# Patient Record
Sex: MALE | Race: WHITE | Employment: OTHER
[De-identification: names, ages, dates, MRNs, and addresses within clinical notes are randomized per-mention and may not be internally consistent; named-entity substitution may affect disease eponyms.]

---

## 2022-03-19 PROBLEM — I65.29 CAROTID STENOSIS, ASYMPTOMATIC: Status: ACTIVE | Noted: 2021-09-23

## 2022-03-19 PROBLEM — I25.5 GENERALIZED ISCHEMIC MYOCARDIAL DYSFUNCTION: Status: ACTIVE | Noted: 2017-07-20

## 2022-03-19 PROBLEM — N18.30 STAGE 3 CHRONIC KIDNEY DISEASE (HCC): Status: ACTIVE | Noted: 2019-03-12

## 2022-03-19 PROBLEM — I42.0 ISCHEMIC CONGESTIVE CARDIOMYOPATHY (HCC): Status: ACTIVE | Noted: 2022-03-02

## 2022-03-19 PROBLEM — F32.A DEPRESSIVE DISORDER: Status: ACTIVE | Noted: 2019-06-23

## 2022-03-19 PROBLEM — M48.061 FORAMINAL STENOSIS OF LUMBAR REGION: Status: ACTIVE | Noted: 2018-01-23

## 2022-03-19 PROBLEM — H54.8 LEGALLY BLIND: Status: ACTIVE | Noted: 2018-09-26

## 2022-03-19 PROBLEM — I25.10 CORONARY ARTERY DISEASE INVOLVING NATIVE CORONARY ARTERY OF NATIVE HEART WITHOUT ANGINA PECTORIS: Status: ACTIVE | Noted: 2021-09-23

## 2022-03-19 PROBLEM — E11.9 DIABETES MELLITUS (HCC): Status: ACTIVE | Noted: 2018-09-26

## 2022-03-19 PROBLEM — Z95.810 AICD (AUTOMATIC CARDIOVERTER/DEFIBRILLATOR) PRESENT: Status: ACTIVE | Noted: 2021-09-14

## 2022-03-19 PROBLEM — G89.29 CHRONIC BILATERAL LOW BACK PAIN WITH RIGHT-SIDED SCIATICA: Status: ACTIVE | Noted: 2018-01-23

## 2022-03-19 PROBLEM — E53.8 COBALAMIN DEFICIENCY: Status: ACTIVE | Noted: 2022-03-02

## 2022-03-19 PROBLEM — M54.41 CHRONIC BILATERAL LOW BACK PAIN WITH RIGHT-SIDED SCIATICA: Status: ACTIVE | Noted: 2018-01-23

## 2022-03-19 PROBLEM — Z95.5 HISTORY OF CORONARY ARTERY STENT PLACEMENT: Status: ACTIVE | Noted: 2017-06-02

## 2022-03-19 PROBLEM — I25.5 ISCHEMIC CONGESTIVE CARDIOMYOPATHY (HCC): Status: ACTIVE | Noted: 2022-03-02

## 2022-03-20 PROBLEM — I65.23 BILATERAL CAROTID ARTERY STENOSIS: Status: ACTIVE | Noted: 2021-09-14

## 2022-03-20 PROBLEM — E78.5 HYPERLIPIDEMIA: Status: ACTIVE | Noted: 2018-09-26

## 2022-03-20 PROBLEM — N40.0 BENIGN PROSTATIC HYPERPLASIA WITHOUT URINARY OBSTRUCTION: Status: ACTIVE | Noted: 2018-09-26

## 2022-06-09 ENCOUNTER — OFFICE VISIT (OUTPATIENT)
Dept: FAMILY MEDICINE CLINIC | Facility: CLINIC | Age: 82
End: 2022-06-09
Payer: MEDICARE

## 2022-06-09 VITALS
HEIGHT: 67 IN | WEIGHT: 168 LBS | HEART RATE: 63 BPM | SYSTOLIC BLOOD PRESSURE: 104 MMHG | TEMPERATURE: 98.3 F | DIASTOLIC BLOOD PRESSURE: 62 MMHG | BODY MASS INDEX: 26.37 KG/M2

## 2022-06-09 DIAGNOSIS — E11.9 TYPE 2 DIABETES MELLITUS WITHOUT COMPLICATION, WITH LONG-TERM CURRENT USE OF INSULIN (HCC): ICD-10-CM

## 2022-06-09 DIAGNOSIS — E11.69 HYPERLIPIDEMIA ASSOCIATED WITH TYPE 2 DIABETES MELLITUS (HCC): ICD-10-CM

## 2022-06-09 DIAGNOSIS — Z79.4 TYPE 2 DIABETES MELLITUS WITHOUT COMPLICATION, WITH LONG-TERM CURRENT USE OF INSULIN (HCC): ICD-10-CM

## 2022-06-09 DIAGNOSIS — N18.31 STAGE 3A CHRONIC KIDNEY DISEASE (HCC): ICD-10-CM

## 2022-06-09 DIAGNOSIS — E78.5 HYPERLIPIDEMIA ASSOCIATED WITH TYPE 2 DIABETES MELLITUS (HCC): ICD-10-CM

## 2022-06-09 DIAGNOSIS — I25.10 CORONARY ARTERY DISEASE INVOLVING NATIVE CORONARY ARTERY OF NATIVE HEART WITHOUT ANGINA PECTORIS: Primary | ICD-10-CM

## 2022-06-09 PROBLEM — N18.30 CHRONIC RENAL DISEASE, STAGE III (HCC): Status: ACTIVE | Noted: 2022-06-09

## 2022-06-09 PROCEDURE — 99214 OFFICE O/P EST MOD 30 MIN: CPT | Performed by: NURSE PRACTITIONER

## 2022-06-09 PROCEDURE — 1123F ACP DISCUSS/DSCN MKR DOCD: CPT | Performed by: NURSE PRACTITIONER

## 2022-06-09 PROCEDURE — 3046F HEMOGLOBIN A1C LEVEL >9.0%: CPT | Performed by: NURSE PRACTITIONER

## 2022-06-09 RX ORDER — AMOXICILLIN 500 MG/1
CAPSULE ORAL
COMMUNITY
Start: 2022-06-06 | End: 2022-07-20 | Stop reason: CLARIF

## 2022-06-09 RX ORDER — BLOOD SUGAR DIAGNOSTIC
1 STRIP MISCELLANEOUS DAILY
COMMUNITY
Start: 2022-05-29

## 2022-06-09 RX ORDER — ROSUVASTATIN CALCIUM 20 MG/1
20 TABLET, COATED ORAL DAILY
Qty: 90 TABLET | Refills: 1 | Status: SHIPPED | OUTPATIENT
Start: 2022-06-09

## 2022-06-09 RX ORDER — EZETIMIBE 10 MG/1
10 TABLET ORAL DAILY
Qty: 90 TABLET | Refills: 1 | Status: SHIPPED | OUTPATIENT
Start: 2022-06-09

## 2022-06-09 RX ORDER — METOPROLOL SUCCINATE 25 MG/1
25 TABLET, EXTENDED RELEASE ORAL DAILY
Qty: 90 TABLET | Refills: 1 | Status: SHIPPED | OUTPATIENT
Start: 2022-06-09 | End: 2022-08-01 | Stop reason: SDUPTHER

## 2022-06-09 RX ORDER — GLIMEPIRIDE 2 MG/1
2 TABLET ORAL DAILY
Qty: 90 TABLET | Refills: 1 | Status: SHIPPED | OUTPATIENT
Start: 2022-06-09

## 2022-06-09 ASSESSMENT — PATIENT HEALTH QUESTIONNAIRE - PHQ9
2. FEELING DOWN, DEPRESSED OR HOPELESS: 0
6. FEELING BAD ABOUT YOURSELF - OR THAT YOU ARE A FAILURE OR HAVE LET YOURSELF OR YOUR FAMILY DOWN: 0
9. THOUGHTS THAT YOU WOULD BE BETTER OFF DEAD, OR OF HURTING YOURSELF: 0
SUM OF ALL RESPONSES TO PHQ QUESTIONS 1-9: 1
7. TROUBLE CONCENTRATING ON THINGS, SUCH AS READING THE NEWSPAPER OR WATCHING TELEVISION: 0
SUM OF ALL RESPONSES TO PHQ9 QUESTIONS 1 & 2: 0
4. FEELING TIRED OR HAVING LITTLE ENERGY: 1
SUM OF ALL RESPONSES TO PHQ QUESTIONS 1-9: 1
5. POOR APPETITE OR OVEREATING: 0
1. LITTLE INTEREST OR PLEASURE IN DOING THINGS: 0
10. IF YOU CHECKED OFF ANY PROBLEMS, HOW DIFFICULT HAVE THESE PROBLEMS MADE IT FOR YOU TO DO YOUR WORK, TAKE CARE OF THINGS AT HOME, OR GET ALONG WITH OTHER PEOPLE: 0
SUM OF ALL RESPONSES TO PHQ QUESTIONS 1-9: 1
SUM OF ALL RESPONSES TO PHQ QUESTIONS 1-9: 1
8. MOVING OR SPEAKING SO SLOWLY THAT OTHER PEOPLE COULD HAVE NOTICED. OR THE OPPOSITE, BEING SO FIGETY OR RESTLESS THAT YOU HAVE BEEN MOVING AROUND A LOT MORE THAN USUAL: 0
3. TROUBLE FALLING OR STAYING ASLEEP: 0

## 2022-06-09 ASSESSMENT — ENCOUNTER SYMPTOMS: SHORTNESS OF BREATH: 0

## 2022-06-09 NOTE — PROGRESS NOTES
Subjective:      Patient ID: Darshan Francois is a 78 y.o. male. He is here for recheck of DM consistent bs of 100-150 mostly in 120 range. Feels well  Some fatigue walking daily and going to the gym for less than an hour has no stamina. Saw a chiropractor for his right hip pain and is doing better. The only issue is getting tired. bp has gotten pretty low. He is getting  next month. Has moved out of assistive living and is walking daily with his fiancce    HPI    Review of Systems   Constitutional: Positive for fatigue. Respiratory: Negative for shortness of breath. Cardiovascular: Negative for chest pain and leg swelling. Endocrine: Negative for polydipsia and polyuria. Objective:   Physical Exam  Vitals and nursing note reviewed. Constitutional:       Appearance: Normal appearance. HENT:      Head: Normocephalic. Cardiovascular:      Rate and Rhythm: Normal rate and regular rhythm. Pulses: Normal pulses. Heart sounds: Normal heart sounds. Pulmonary:      Effort: Pulmonary effort is normal.      Breath sounds: Normal breath sounds. Musculoskeletal:         General: Normal range of motion. Skin:     General: Skin is warm and dry. Capillary Refill: Capillary refill takes less than 2 seconds. Neurological:      General: No focal deficit present. Mental Status: He is alert and oriented to person, place, and time. Psychiatric:         Mood and Affect: Mood normal.         Behavior: Behavior normal.         Thought Content: Thought content normal.         Judgment: Judgment normal.         Assessment:     /62 (Site: Left Upper Arm, Position: Sitting, Cuff Size: Medium Adult)   Pulse 63   Temp 98.3 °F (36.8 °C) (Temporal)   Ht 5' 6.54\" (1.69 m)   Wt 168 lb (76.2 kg)   BMI 26.68 kg/m²       Plan:      1.  Coronary artery disease involving native coronary artery of native heart without angina pectoris  -     metoprolol succinate (TOPROL XL) 25 MG extended release tablet; Take 1 tablet by mouth daily, Disp-90 tablet, R-1Normal  2. Type 2 diabetes mellitus without complication, with long-term current use of insulin (HCC)  -     empagliflozin (JARDIANCE) 25 MG tablet; Take 1 tablet by mouth daily, Disp-90 tablet, R-1Normal  -     glimepiride (AMARYL) 2 MG tablet; Take 1 tablet by mouth daily, Disp-90 tablet, R-1Normal  -     insulin glargine (LANTUS;BASAGLAR) 100 UNIT/ML injection pen; Inject 10 Units into the skin daily, Disp-5 pen, R-5Normal  -     Comprehensive Metabolic Panel; Future  -     Hemoglobin A1C; Future  3. Stage 3a chronic kidney disease ([facility] [address]  4. Hyperlipidemia associated with type 2 diabetes mellitus (HCC)  -     ezetimibe (ZETIA) 10 mg tablet; Take 1 tablet by mouth daily, Disp-90 tablet, R-1Normal  -     rosuvastatin (CRESTOR) 20 MG tablet; Take 1 tablet by mouth daily, Disp-90 tablet, R-1Normal  -     Lipid Panel; Future  -     Microalbumin / Creatinine Urine Ratio; Future  Due to low bp will have him hold his cozaar. Checking labs.  Is to contact us for any new or worsening issues and reminded to stay hydrated drink water        XI Ruiz NP

## 2022-06-10 ENCOUNTER — TELEPHONE (OUTPATIENT)
Dept: FAMILY MEDICINE CLINIC | Facility: CLINIC | Age: 82
End: 2022-06-10

## 2022-06-10 LAB
ALBUMIN SERPL-MCNC: 3.7 G/DL (ref 3.2–4.6)
ALBUMIN/GLOB SERPL: 1.2 {RATIO} (ref 1.2–3.5)
ALP SERPL-CCNC: 91 U/L (ref 50–136)
ALT SERPL-CCNC: 35 U/L (ref 12–65)
ANION GAP SERPL CALC-SCNC: 6 MMOL/L (ref 7–16)
AST SERPL-CCNC: 22 U/L (ref 15–37)
BILIRUB SERPL-MCNC: 0.6 MG/DL (ref 0.2–1.1)
BUN SERPL-MCNC: 21 MG/DL (ref 8–23)
CALCIUM SERPL-MCNC: 8.6 MG/DL (ref 8.3–10.4)
CHLORIDE SERPL-SCNC: 108 MMOL/L (ref 98–107)
CHOLEST SERPL-MCNC: 98 MG/DL
CO2 SERPL-SCNC: 27 MMOL/L (ref 21–32)
CREAT SERPL-MCNC: 1.5 MG/DL (ref 0.8–1.5)
CREAT UR-MCNC: 64 MG/DL
EST. AVERAGE GLUCOSE BLD GHB EST-MCNC: 166 MG/DL
GLOBULIN SER CALC-MCNC: 3.2 G/DL (ref 2.3–3.5)
GLUCOSE SERPL-MCNC: 254 MG/DL (ref 65–100)
HBA1C MFR BLD: 7.4 % (ref 4.2–6.3)
HDLC SERPL-MCNC: 42 MG/DL (ref 40–60)
HDLC SERPL: 2.3 {RATIO}
LDLC SERPL CALC-MCNC: 37.4 MG/DL
MICROALBUMIN UR-MCNC: 1.08 MG/DL
MICROALBUMIN/CREAT UR-RTO: 17 MG/G
POTASSIUM SERPL-SCNC: 4.1 MMOL/L (ref 3.5–5.1)
PROT SERPL-MCNC: 6.9 G/DL (ref 6.3–8.2)
SODIUM SERPL-SCNC: 141 MMOL/L (ref 138–145)
TRIGL SERPL-MCNC: 93 MG/DL (ref 35–150)
VLDLC SERPL CALC-MCNC: 18.6 MG/DL (ref 6–23)

## 2022-06-10 NOTE — TELEPHONE ENCOUNTER
----- Message from XI Hawkins NP sent at 6/10/2022  7:43 AM EDT -----  HgA1c is  now 7.4 which is great all other labs are stable.   Let me know how you feel off the cozaar

## 2022-07-06 ENCOUNTER — TELEPHONE (OUTPATIENT)
Dept: FAMILY MEDICINE CLINIC | Facility: CLINIC | Age: 82
End: 2022-07-06

## 2022-07-20 ENCOUNTER — OFFICE VISIT (OUTPATIENT)
Dept: FAMILY MEDICINE CLINIC | Facility: CLINIC | Age: 82
End: 2022-07-20
Payer: MEDICARE

## 2022-07-20 VITALS
WEIGHT: 162 LBS | SYSTOLIC BLOOD PRESSURE: 132 MMHG | TEMPERATURE: 98.2 F | HEART RATE: 78 BPM | DIASTOLIC BLOOD PRESSURE: 73 MMHG | BODY MASS INDEX: 25.43 KG/M2 | HEIGHT: 67 IN

## 2022-07-20 DIAGNOSIS — Z79.4 TYPE 2 DIABETES MELLITUS WITHOUT COMPLICATION, WITH LONG-TERM CURRENT USE OF INSULIN (HCC): ICD-10-CM

## 2022-07-20 DIAGNOSIS — N40.1 BPH WITH URINARY OBSTRUCTION: Primary | ICD-10-CM

## 2022-07-20 DIAGNOSIS — N13.8 BPH WITH URINARY OBSTRUCTION: Primary | ICD-10-CM

## 2022-07-20 DIAGNOSIS — E11.9 TYPE 2 DIABETES MELLITUS WITHOUT COMPLICATION, WITH LONG-TERM CURRENT USE OF INSULIN (HCC): ICD-10-CM

## 2022-07-20 PROBLEM — E11.22 TYPE 2 DIABETES MELLITUS WITH CHRONIC KIDNEY DISEASE (HCC): Status: ACTIVE | Noted: 2022-07-20

## 2022-07-20 LAB
ANION GAP SERPL CALC-SCNC: 5 MMOL/L (ref 7–16)
BUN SERPL-MCNC: 21 MG/DL (ref 8–23)
CALCIUM SERPL-MCNC: 9.5 MG/DL (ref 8.3–10.4)
CHLORIDE SERPL-SCNC: 101 MMOL/L (ref 98–107)
CO2 SERPL-SCNC: 28 MMOL/L (ref 21–32)
CREAT SERPL-MCNC: 1.6 MG/DL (ref 0.8–1.5)
EST. AVERAGE GLUCOSE BLD GHB EST-MCNC: 154 MG/DL
GLUCOSE SERPL-MCNC: 284 MG/DL (ref 65–100)
HBA1C MFR BLD: 7 % (ref 4.8–5.6)
POTASSIUM SERPL-SCNC: 4.8 MMOL/L (ref 3.5–5.1)
SODIUM SERPL-SCNC: 134 MMOL/L (ref 138–145)

## 2022-07-20 PROCEDURE — 99214 OFFICE O/P EST MOD 30 MIN: CPT | Performed by: NURSE PRACTITIONER

## 2022-07-20 PROCEDURE — 3051F HG A1C>EQUAL 7.0%<8.0%: CPT | Performed by: NURSE PRACTITIONER

## 2022-07-20 PROCEDURE — 1123F ACP DISCUSS/DSCN MKR DOCD: CPT | Performed by: NURSE PRACTITIONER

## 2022-07-20 RX ORDER — TAMSULOSIN HYDROCHLORIDE 0.4 MG/1
CAPSULE ORAL
COMMUNITY
Start: 2022-07-03 | End: 2022-07-20 | Stop reason: CLARIF

## 2022-07-20 ASSESSMENT — ENCOUNTER SYMPTOMS
SHORTNESS OF BREATH: 0
CHEST TIGHTNESS: 0

## 2022-07-20 ASSESSMENT — PATIENT HEALTH QUESTIONNAIRE - PHQ9
4. FEELING TIRED OR HAVING LITTLE ENERGY: 0
SUM OF ALL RESPONSES TO PHQ QUESTIONS 1-9: 0
SUM OF ALL RESPONSES TO PHQ9 QUESTIONS 1 & 2: 0
SUM OF ALL RESPONSES TO PHQ QUESTIONS 1-9: 0
1. LITTLE INTEREST OR PLEASURE IN DOING THINGS: 0
2. FEELING DOWN, DEPRESSED OR HOPELESS: 0
7. TROUBLE CONCENTRATING ON THINGS, SUCH AS READING THE NEWSPAPER OR WATCHING TELEVISION: 0
10. IF YOU CHECKED OFF ANY PROBLEMS, HOW DIFFICULT HAVE THESE PROBLEMS MADE IT FOR YOU TO DO YOUR WORK, TAKE CARE OF THINGS AT HOME, OR GET ALONG WITH OTHER PEOPLE: 0
8. MOVING OR SPEAKING SO SLOWLY THAT OTHER PEOPLE COULD HAVE NOTICED. OR THE OPPOSITE, BEING SO FIGETY OR RESTLESS THAT YOU HAVE BEEN MOVING AROUND A LOT MORE THAN USUAL: 0
5. POOR APPETITE OR OVEREATING: 0
6. FEELING BAD ABOUT YOURSELF - OR THAT YOU ARE A FAILURE OR HAVE LET YOURSELF OR YOUR FAMILY DOWN: 0
SUM OF ALL RESPONSES TO PHQ QUESTIONS 1-9: 0
3. TROUBLE FALLING OR STAYING ASLEEP: 0
9. THOUGHTS THAT YOU WOULD BE BETTER OFF DEAD, OR OF HURTING YOURSELF: 0
SUM OF ALL RESPONSES TO PHQ QUESTIONS 1-9: 0

## 2022-07-20 NOTE — PROGRESS NOTES
Subjective:      Patient ID: Sherryl Canavan is a 78 y.o. male. Here for follow from being in hospital in [facility] for a urinary tract infection enlarged prostate and possible sepsis. Was given strong iv antibiotics. Had stress test and a CT or head and chest all test normal except for UA and infection. BS is doing well mostly less than 120 feels well no abdominal pain is urination without difficulty at present finished antibiotic   Broke up with fiancee is now living alone in an apartment daughters are helping him long distance to order food . He is happy. Glad he did not  that woman  HPI    Review of Systems   Constitutional:  Positive for fatigue (improving). Negative for chills and fever. Respiratory:  Negative for chest tightness and shortness of breath. Cardiovascular:  Negative for chest pain. Genitourinary:  Negative for dysuria and frequency. Objective:   Physical Exam  Vitals and nursing note reviewed. Constitutional:       Appearance: Normal appearance. Comments: Elderly male walks without assistance low vision looks indirectly at things and using a magnifying glass to look at his phone   Neurological:      Mental Status: He is alert. Assessment:      /73 (Site: Left Upper Arm, Position: Sitting, Cuff Size: Medium Adult)   Pulse 78   Temp 98.2 °F (36.8 °C) (Temporal)   Ht 5' 6.54\" (1.69 m)   Wt 162 lb (73.5 kg)   BMI 25.72 kg/m²        Plan:      1. BPH with urinary obstruction  -     [facility] - Paz Najera MD, Urology, [facility]  2. Type 2 diabetes mellitus without complication, with long-term current use of insulin ([facility])  -     Hemoglobin A1C; Future  -     Basic Metabolic Panel; Future     Records reviewed from recent hospiiaization All meds reviewed. Will refer to urology for his prostate as may need surgery Is to make an apt with cardiology as recommended on his discharge.  Is to keep follow up for dm  XI Redmond - VALERIA

## 2022-07-21 ENCOUNTER — TELEPHONE (OUTPATIENT)
Dept: FAMILY MEDICINE CLINIC | Facility: CLINIC | Age: 82
End: 2022-07-21

## 2022-08-01 DIAGNOSIS — I25.10 CORONARY ARTERY DISEASE INVOLVING NATIVE CORONARY ARTERY OF NATIVE HEART WITHOUT ANGINA PECTORIS: ICD-10-CM

## 2022-08-02 DIAGNOSIS — I25.10 CORONARY ARTERY DISEASE INVOLVING NATIVE CORONARY ARTERY OF NATIVE HEART WITHOUT ANGINA PECTORIS: ICD-10-CM

## 2022-08-02 RX ORDER — METOPROLOL SUCCINATE 25 MG/1
25 TABLET, EXTENDED RELEASE ORAL DAILY
Qty: 90 TABLET | Refills: 1 | Status: CANCELLED | OUTPATIENT
Start: 2022-08-02

## 2022-08-02 RX ORDER — METOPROLOL SUCCINATE 25 MG/1
25 TABLET, EXTENDED RELEASE ORAL DAILY
Qty: 90 TABLET | Refills: 1 | Status: SHIPPED | OUTPATIENT
Start: 2022-08-02

## 2022-09-08 ENCOUNTER — APPOINTMENT (OUTPATIENT)
Dept: GENERAL RADIOLOGY | Age: 82
End: 2022-09-08
Payer: MEDICARE

## 2022-09-08 ENCOUNTER — HOSPITAL ENCOUNTER (EMERGENCY)
Age: 82
Discharge: HOME | End: 2022-09-08
Attending: EMERGENCY MEDICINE
Payer: MEDICARE

## 2022-09-08 VITALS
BODY MASS INDEX: 22.4 KG/M2 | DIASTOLIC BLOOD PRESSURE: 79 MMHG | HEART RATE: 72 BPM | HEIGHT: 71 IN | RESPIRATION RATE: 21 BRPM | OXYGEN SATURATION: 96 % | TEMPERATURE: 98.6 F | WEIGHT: 160 LBS | SYSTOLIC BLOOD PRESSURE: 132 MMHG

## 2022-09-08 DIAGNOSIS — R19.7 DIARRHEA, UNSPECIFIED TYPE: Primary | ICD-10-CM

## 2022-09-08 LAB
ALBUMIN SERPL-MCNC: 3.7 G/DL (ref 3.2–4.6)
ALBUMIN/GLOB SERPL: 1.1 {RATIO} (ref 1.2–3.5)
ALP SERPL-CCNC: 94 U/L (ref 50–136)
ALT SERPL-CCNC: 50 U/L (ref 12–65)
ANION GAP SERPL CALC-SCNC: 6 MMOL/L (ref 4–13)
AST SERPL-CCNC: 50 U/L (ref 15–37)
BILIRUB SERPL-MCNC: 0.9 MG/DL (ref 0.2–1.1)
BUN SERPL-MCNC: 16 MG/DL (ref 8–23)
CALCIUM SERPL-MCNC: 8.8 MG/DL (ref 8.3–10.4)
CHLORIDE SERPL-SCNC: 107 MMOL/L (ref 101–110)
CO2 SERPL-SCNC: 24 MMOL/L (ref 21–32)
CREAT SERPL-MCNC: 1.5 MG/DL (ref 0.8–1.5)
EKG ATRIAL RATE: 76 BPM
EKG DIAGNOSIS: NORMAL
EKG P AXIS: 56 DEGREES
EKG P-R INTERVAL: 173 MS
EKG Q-T INTERVAL: 386 MS
EKG QRS DURATION: 98 MS
EKG QTC CALCULATION (BAZETT): 437 MS
EKG R AXIS: 80 DEGREES
EKG T AXIS: 74 DEGREES
EKG VENTRICULAR RATE: 77 BPM
ERYTHROCYTE [DISTWIDTH] IN BLOOD BY AUTOMATED COUNT: 13.5 % (ref 11.9–14.6)
GLOBULIN SER CALC-MCNC: 3.4 G/DL (ref 2.3–3.5)
GLUCOSE SERPL-MCNC: 201 MG/DL (ref 65–100)
HCT VFR BLD AUTO: 39.4 % (ref 41.1–50.3)
HGB BLD-MCNC: 13.4 G/DL (ref 13.6–17.2)
LIPASE SERPL-CCNC: 118 U/L (ref 73–393)
MCH RBC QN AUTO: 33.5 PG (ref 26.1–32.9)
MCHC RBC AUTO-ENTMCNC: 34 G/DL (ref 31.4–35)
MCV RBC AUTO: 98.5 FL (ref 79.6–97.8)
NRBC # BLD: 0 K/UL (ref 0–0.2)
PLATELET # BLD AUTO: 85 K/UL (ref 150–450)
PMV BLD AUTO: 11 FL (ref 9.4–12.3)
POTASSIUM SERPL-SCNC: 4 MMOL/L (ref 3.5–5.1)
PROT SERPL-MCNC: 7.1 G/DL (ref 6.3–8.2)
RBC # BLD AUTO: 4 M/UL (ref 4.23–5.6)
SARS-COV-2 RDRP RESP QL NAA+PROBE: NOT DETECTED
SODIUM SERPL-SCNC: 137 MMOL/L (ref 138–145)
SOURCE: NORMAL
TROPONIN I SERPL HS-MCNC: 8 PG/ML (ref 0–14)
TROPONIN I SERPL HS-MCNC: 8.8 PG/ML (ref 0–14)
WBC # BLD AUTO: 7.1 K/UL (ref 4.3–11.1)

## 2022-09-08 PROCEDURE — 99285 EMERGENCY DEPT VISIT HI MDM: CPT

## 2022-09-08 PROCEDURE — 84484 ASSAY OF TROPONIN QUANT: CPT

## 2022-09-08 PROCEDURE — 83690 ASSAY OF LIPASE: CPT

## 2022-09-08 PROCEDURE — 87635 SARS-COV-2 COVID-19 AMP PRB: CPT

## 2022-09-08 PROCEDURE — 2580000003 HC RX 258: Performed by: EMERGENCY MEDICINE

## 2022-09-08 PROCEDURE — 71046 X-RAY EXAM CHEST 2 VIEWS: CPT

## 2022-09-08 PROCEDURE — 85027 COMPLETE CBC AUTOMATED: CPT

## 2022-09-08 PROCEDURE — 94762 N-INVAS EAR/PLS OXIMTRY CONT: CPT

## 2022-09-08 PROCEDURE — 93005 ELECTROCARDIOGRAM TRACING: CPT | Performed by: EMERGENCY MEDICINE

## 2022-09-08 PROCEDURE — 80053 COMPREHEN METABOLIC PANEL: CPT

## 2022-09-08 RX ORDER — SODIUM CHLORIDE 9 MG/ML
INJECTION, SOLUTION INTRAVENOUS CONTINUOUS
Status: DISCONTINUED | OUTPATIENT
Start: 2022-09-08 | End: 2022-09-08 | Stop reason: HOSPADM

## 2022-09-08 RX ORDER — SUCRALFATE 1 G/1
1 TABLET ORAL 4 TIMES DAILY
Qty: 40 TABLET | Refills: 0 | Status: SHIPPED | OUTPATIENT
Start: 2022-09-08 | End: 2022-09-26 | Stop reason: CLARIF

## 2022-09-08 RX ADMIN — SODIUM CHLORIDE: 9 INJECTION, SOLUTION INTRAVENOUS at 16:24

## 2022-09-08 ASSESSMENT — PAIN SCALES - GENERAL: PAINLEVEL_OUTOF10: 0

## 2022-09-08 ASSESSMENT — PAIN - FUNCTIONAL ASSESSMENT: PAIN_FUNCTIONAL_ASSESSMENT: 0-10

## 2022-09-08 NOTE — ED TRIAGE NOTES
Patient arrived by EMS from home due to breathing problems and diarrhea x 1 week. On arrival patient had ST elevation on EKG. Hx of pacemaker and stent pacemaker.            Bgl 212, bp 126/70 hr 76, temp. 97.2

## 2022-09-08 NOTE — ED PROVIDER NOTES
Emergency Department Provider Note                   PCP:                XI Lino NP               Age: 78 y.o. Sex: male       ICD-10-CM    1. Diarrhea, unspecified type  R19.7           DISPOSITION Decision To Discharge 09/08/2022 05:49:35 PM        MDM  Number of Diagnoses or Management Options  Diarrhea, unspecified type  Diagnosis management comments: Addendum entered on October 1, 2022. I saw the patient on October 1 for his episode of diverticulitis at that point. I then pulled up his previous visits and saw this note for me that only included his medical decision making ED course section. I do not have any specific recollection of the exact history and physical at the time of this initial visit and only remember the medical decision making related to this visit. Orders Placed This Encounter   Procedures    COVID-19, Rapid    XR CHEST (2 VW)    CBC    Comprehensive Metabolic Panel    Troponin    Lipase    Cardiac Monitor    Pulse Oximetry    EKG 12 Lead    Saline lock IV        Medications   0.9 % sodium chloride infusion ( IntraVENous New Bag 9/8/22 7996)       New Prescriptions    SUCRALFATE (CARAFATE) 1 GM TABLET    Take 1 tablet by mouth 4 times daily for 10 days        Lindsey Landry is a 78 y.o. male who presents to the Emergency Department with chief complaint of    Chief Complaint   Patient presents with    Shortness of Breath      78year-old gentleman presents with concerns about diarrhea for the last 4 days.         Review of Systems    Past Medical History:   Diagnosis Date    BPH (benign prostatic hyperplasia)     Diabetes ([address]     Heart disease     High cholesterol     Histoplasmosis     right eye    Kidney disease     Macular degeneration     legally blind        Past Surgical History:   Procedure Laterality Date    ABDOMINAL AORTIC ANEURYSM REPAIR      CATARACT REMOVAL      rigth eye    HX PARTIAL COLECTOMY      due to severe diverticulosis    OTHER SURGICAL HISTORY      stents to coronsary arteries. 2 at different times        Family History   Problem Relation Age of Onset    Abdominal aortic aneurysm Brother     Cancer Mother         esopageal        Social History     Socioeconomic History    Marital status:    Tobacco Use    Smoking status: Former     Packs/day: 2.00     Types: Cigarettes     Quit date: 1989     Years since quittin.7    Smokeless tobacco: Never   Substance and Sexual Activity    Alcohol use: Yes    Drug use: Never   Social History Narrative     5  5 years ago just moved in  to an independent living facility here in [place] from [facility]. He has grown kids  Hannah Dickerson daughter lives in [place] lives in [place]. Does not have a Health Care POA         Patient has no known allergies. Previous Medications    B COMPLEX VITAMINS (B COMPLEX PO)    Take 1 tablet by mouth daily    BABY ASPIRIN PO    Take by mouth daily    COENZYME Q10 (CO Q-10 PO)    Take by mouth daily    CYANOCOBALAMIN 1000 MCG TABLET    Take 1,000 mcg by mouth daily     EMPAGLIFLOZIN (JARDIANCE) 25 MG TABLET    Take 1 tablet by mouth daily    EZETIMIBE (ZETIA) 10 MG TABLET    Take 1 tablet by mouth daily    GLIMEPIRIDE (AMARYL) 2 MG TABLET    Take 1 tablet by mouth daily    INSULIN GLARGINE (LANTUS;BASAGLAR) 100 UNIT/ML INJECTION PEN    Inject 10 Units into the skin daily    METOPROLOL SUCCINATE (TOPROL XL) 25 MG EXTENDED RELEASE TABLET    Take 1 tablet by mouth in the morning. OMEGA-3 FATTY ACIDS (FISH OIL OMEGA-3 PO)    Take by mouth daily    ONETOUCH ULTRA STRIP    Inject 1 each into the skin daily     ROSUVASTATIN (CRESTOR) 20 MG TABLET    Take 1 tablet by mouth daily        Vitals signs and nursing note reviewed.    Patient Vitals for the past 4 hrs:   Temp Pulse Resp BP SpO2   22 1730 -- 73 -- (!) 142/64 96 %   22 1515 98.6 °F (37 °C) -- -- -- --   22 1513 -- 78 18 112/72 94 %          Physical Exam Procedures      Results Include:    Recent Results (from the past 24 hour(s))   CBC    Collection Time: 09/08/22  3:20 PM   Result Value Ref Range    WBC 7.1 4.3 - 11.1 K/uL    RBC 4.00 (L) 4.23 - 5.6 M/uL    Hemoglobin 13.4 (L) 13.6 - 17.2 g/dL    Hematocrit 39.4 (L) 41.1 - 50.3 %    MCV 98.5 (H) 79.6 - 97.8 FL    MCH 33.5 (H) 26.1 - 32.9 PG    MCHC 34.0 31.4 - 35.0 g/dL    RDW 13.5 11.9 - 14.6 %    Platelets 85 (L) 382 - 450 K/uL    MPV 11.0 9.4 - 12.3 FL    nRBC 0.00 0.0 - 0.2 K/uL   Comprehensive Metabolic Panel    Collection Time: 09/08/22  3:20 PM   Result Value Ref Range    Sodium 137 (L) 138 - 145 mmol/L    Potassium 4.0 3.5 - 5.1 mmol/L    Chloride 107 101 - 110 mmol/L    CO2 24 21 - 32 mmol/L    Anion Gap 6 4 - 13 mmol/L    Glucose 201 (H) 65 - 100 mg/dL    BUN 16 8 - 23 MG/DL    Creatinine 1.50 0.8 - 1.5 MG/DL    GFR  57 (L) >60 ml/min/1.73m2    GFR Non- 47 (L) >60 ml/min/1.73m2    Calcium 8.8 8.3 - 10.4 MG/DL    Total Bilirubin 0.9 0.2 - 1.1 MG/DL    ALT 50 12 - 65 U/L    AST 50 (H) 15 - 37 U/L    Alk Phosphatase 94 50 - 136 U/L    Total Protein 7.1 6.3 - 8.2 g/dL    Albumin 3.7 3.2 - 4.6 g/dL    Globulin 3.4 2.3 - 3.5 g/dL    Albumin/Globulin Ratio 1.1 (L) 1.2 - 3.5     Troponin    Collection Time: 09/08/22  3:20 PM   Result Value Ref Range    Troponin, High Sensitivity 8.0 0 - 14 pg/mL   Lipase    Collection Time: 09/08/22  3:20 PM   Result Value Ref Range    Lipase 118 73 - 393 U/L   Troponin    Collection Time: 09/08/22  4:52 PM   Result Value Ref Range    Troponin, High Sensitivity 8.8 0 - 14 pg/mL   COVID-19, Rapid    Collection Time: 09/08/22  4:52 PM    Specimen: Nasopharyngeal   Result Value Ref Range    Source NASAL      SARS-CoV-2, Rapid Not detected NOTD            XR CHEST (2 VW)   Final Result   1. No acute findings in the chest.   2.  Multiple age-indeterminate left rib fractures. 3.  6 mm nodule projecting over the lung bases on the lateral view. Recommend   evaluation with nonemergent chest CT. ED Course as of 10/01/22 1920   Thu Sep 08, 2022   1604 EKG review by ER doctor:  Normal sinus rhythm  No acute ischemic ST segment changes  He does have concave ST segments in 2, 3, and aVF. Will not ECG to compare it to. He is not having any chest pain or pressure and I do not think this represents a STEMI. No QTC prolongation  Rate of: 77 [AC]   1747 Patient's repeat troponin is negative. His COVID test is negative. His other blood work is unremarkable. I do not think he has anything urgent or emergent and I will discharge him home. [AC]      ED Course User Index  [AC] Rhea Mcginnis MD        Voice dictation software was used during the making of this note. This software is not perfect and grammatical and other typographical errors may be present. This note has not been completely proofread for errors.         Rhea Mcginnis MD  09/08/22 1751       Rhea Mcginnis MD  10/01/22 Gladys Certain

## 2022-09-08 NOTE — ED NOTES
I have reviewed discharge instructions with the patient. The patient verbalized understanding. Patient left ED via Discharge Method: ambulatory to Home with self. Opportunity for questions and clarification provided. Patient given 1 scripts. To continue your aftercare when you leave the hospital, you may receive an automated call from our care team to check in on how you are doing. This is a free service and part of our promise to provide the best care and service to meet your aftercare needs.  If you have questions, or wish to unsubscribe from this service please call [phone]. Thank you for Choosing our [facility] Emergency Department.         Pauline Harper RN  09/08/22 2946

## 2022-09-08 NOTE — DISCHARGE INSTRUCTIONS
Please return with any blood in your bowels, vomiting, difficulty breathing, worsening symptoms, or additional concerns. Follow-up with your primary care doctor for reevaluation.

## 2022-09-09 ENCOUNTER — HOSPITAL ENCOUNTER (EMERGENCY)
Age: 82
Discharge: HOME | End: 2022-09-09
Attending: STUDENT IN AN ORGANIZED HEALTH CARE EDUCATION/TRAINING PROGRAM
Payer: MEDICARE

## 2022-09-09 ENCOUNTER — CARE COORDINATION (OUTPATIENT)
Dept: CARE COORDINATION | Facility: CLINIC | Age: 82
End: 2022-09-09

## 2022-09-09 ENCOUNTER — APPOINTMENT (OUTPATIENT)
Dept: CT IMAGING | Age: 82
End: 2022-09-09
Payer: MEDICARE

## 2022-09-09 VITALS
HEIGHT: 69 IN | OXYGEN SATURATION: 95 % | SYSTOLIC BLOOD PRESSURE: 139 MMHG | DIASTOLIC BLOOD PRESSURE: 78 MMHG | BODY MASS INDEX: 23.7 KG/M2 | HEART RATE: 86 BPM | TEMPERATURE: 98.2 F | WEIGHT: 160 LBS | RESPIRATION RATE: 19 BRPM

## 2022-09-09 DIAGNOSIS — K57.32 DIVERTICULITIS OF COLON: Primary | ICD-10-CM

## 2022-09-09 LAB
ALBUMIN SERPL-MCNC: 3.5 G/DL (ref 3.2–4.6)
ALBUMIN/GLOB SERPL: 0.9 {RATIO} (ref 1.2–3.5)
ALP SERPL-CCNC: 83 U/L (ref 50–136)
ALT SERPL-CCNC: 43 U/L (ref 12–65)
ANION GAP SERPL CALC-SCNC: 6 MMOL/L (ref 4–13)
AST SERPL-CCNC: 26 U/L (ref 15–37)
BASOPHILS # BLD: 0 K/UL (ref 0–0.2)
BASOPHILS NFR BLD: 0 % (ref 0–2)
BILIRUB SERPL-MCNC: 1 MG/DL (ref 0.2–1.1)
BUN SERPL-MCNC: 16 MG/DL (ref 8–23)
CALCIUM SERPL-MCNC: 9 MG/DL (ref 8.3–10.4)
CHLORIDE SERPL-SCNC: 106 MMOL/L (ref 101–110)
CO2 SERPL-SCNC: 27 MMOL/L (ref 21–32)
CREAT SERPL-MCNC: 1.2 MG/DL (ref 0.8–1.5)
DIFFERENTIAL METHOD BLD: ABNORMAL
EOSINOPHIL # BLD: 0.1 K/UL (ref 0–0.8)
EOSINOPHIL NFR BLD: 1 % (ref 0.5–7.8)
ERYTHROCYTE [DISTWIDTH] IN BLOOD BY AUTOMATED COUNT: 13.5 % (ref 11.9–14.6)
GLOBULIN SER CALC-MCNC: 3.7 G/DL (ref 2.3–3.5)
GLUCOSE SERPL-MCNC: 171 MG/DL (ref 65–100)
HCT VFR BLD AUTO: 39.4 % (ref 41.1–50.3)
HGB BLD-MCNC: 13.3 G/DL (ref 13.6–17.2)
IMM GRANULOCYTES # BLD AUTO: 0 K/UL (ref 0–0.5)
IMM GRANULOCYTES NFR BLD AUTO: 0 % (ref 0–5)
LACTATE SERPL-SCNC: 1.4 MMOL/L (ref 0.4–2)
LIPASE SERPL-CCNC: 160 U/L (ref 73–393)
LYMPHOCYTES # BLD: 0.7 K/UL (ref 0.5–4.6)
LYMPHOCYTES NFR BLD: 9 % (ref 13–44)
MCH RBC QN AUTO: 33.5 PG (ref 26.1–32.9)
MCHC RBC AUTO-ENTMCNC: 33.8 G/DL (ref 31.4–35)
MCV RBC AUTO: 99.2 FL (ref 79.6–97.8)
MONOCYTES # BLD: 0.6 K/UL (ref 0.1–1.3)
MONOCYTES NFR BLD: 9 % (ref 4–12)
NEUTS SEG # BLD: 5.9 K/UL (ref 1.7–8.2)
NEUTS SEG NFR BLD: 81 % (ref 43–78)
NRBC # BLD: 0 K/UL (ref 0–0.2)
PLATELET # BLD AUTO: 78 K/UL (ref 150–450)
PMV BLD AUTO: 11 FL (ref 9.4–12.3)
POTASSIUM SERPL-SCNC: 3.9 MMOL/L (ref 3.5–5.1)
PROT SERPL-MCNC: 7.2 G/DL (ref 6.3–8.2)
RBC # BLD AUTO: 3.97 M/UL (ref 4.23–5.6)
SODIUM SERPL-SCNC: 139 MMOL/L (ref 138–145)
WBC # BLD AUTO: 7.2 K/UL (ref 4.3–11.1)

## 2022-09-09 PROCEDURE — 6360000004 HC RX CONTRAST MEDICATION: Performed by: STUDENT IN AN ORGANIZED HEALTH CARE EDUCATION/TRAINING PROGRAM

## 2022-09-09 PROCEDURE — 74177 CT ABD & PELVIS W/CONTRAST: CPT

## 2022-09-09 PROCEDURE — 99285 EMERGENCY DEPT VISIT HI MDM: CPT

## 2022-09-09 PROCEDURE — 83690 ASSAY OF LIPASE: CPT

## 2022-09-09 PROCEDURE — 85025 COMPLETE CBC W/AUTO DIFF WBC: CPT

## 2022-09-09 PROCEDURE — 80053 COMPREHEN METABOLIC PANEL: CPT

## 2022-09-09 PROCEDURE — 83605 ASSAY OF LACTIC ACID: CPT

## 2022-09-09 PROCEDURE — 2580000003 HC RX 258: Performed by: STUDENT IN AN ORGANIZED HEALTH CARE EDUCATION/TRAINING PROGRAM

## 2022-09-09 RX ORDER — METRONIDAZOLE 500 MG/1
500 TABLET ORAL 2 TIMES DAILY
Qty: 14 TABLET | Refills: 0 | Status: SHIPPED | OUTPATIENT
Start: 2022-09-09 | End: 2022-09-26 | Stop reason: SDUPTHER

## 2022-09-09 RX ORDER — SODIUM CHLORIDE 0.9 % (FLUSH) 0.9 %
10 SYRINGE (ML) INJECTION
Status: COMPLETED | OUTPATIENT
Start: 2022-09-09 | End: 2022-09-09

## 2022-09-09 RX ORDER — ONDANSETRON 4 MG/1
4 TABLET, FILM COATED ORAL 3 TIMES DAILY PRN
Qty: 15 TABLET | Refills: 0 | Status: SHIPPED | OUTPATIENT
Start: 2022-09-09 | End: 2022-09-14

## 2022-09-09 RX ORDER — CEFDINIR 300 MG/1
300 CAPSULE ORAL 2 TIMES DAILY
Qty: 14 CAPSULE | Refills: 0 | Status: SHIPPED | OUTPATIENT
Start: 2022-09-09 | End: 2022-09-26 | Stop reason: SDUPTHER

## 2022-09-09 RX ORDER — HYDROCODONE BITARTRATE AND ACETAMINOPHEN 5; 325 MG/1; MG/1
1 TABLET ORAL EVERY 6 HOURS PRN
Qty: 10 TABLET | Refills: 0 | Status: SHIPPED | OUTPATIENT
Start: 2022-09-09 | End: 2022-09-12

## 2022-09-09 RX ORDER — 0.9 % SODIUM CHLORIDE 0.9 %
100 INTRAVENOUS SOLUTION INTRAVENOUS ONCE
Status: COMPLETED | OUTPATIENT
Start: 2022-09-09 | End: 2022-09-09

## 2022-09-09 RX ORDER — AMOXICILLIN AND CLAVULANATE POTASSIUM 875; 125 MG/1; MG/1
1 TABLET, FILM COATED ORAL 2 TIMES DAILY
Qty: 20 TABLET | Refills: 0 | Status: SHIPPED | OUTPATIENT
Start: 2022-09-09 | End: 2022-09-09 | Stop reason: ALTCHOICE

## 2022-09-09 RX ADMIN — SODIUM CHLORIDE 100 ML: 9 INJECTION, SOLUTION INTRAVENOUS at 12:23

## 2022-09-09 RX ADMIN — IOPAMIDOL 100 ML: 755 INJECTION, SOLUTION INTRAVENOUS at 12:23

## 2022-09-09 RX ADMIN — SODIUM CHLORIDE, PRESERVATIVE FREE 10 ML: 5 INJECTION INTRAVENOUS at 12:23

## 2022-09-09 ASSESSMENT — ENCOUNTER SYMPTOMS
SORE THROAT: 0
DIARRHEA: 1
SINUS PRESSURE: 0
CHEST TIGHTNESS: 0
SHORTNESS OF BREATH: 0
EYE DISCHARGE: 0
EYE PAIN: 0
PHOTOPHOBIA: 0
CONSTIPATION: 0
BLOOD IN STOOL: 0
RHINORRHEA: 0
COUGH: 0
EYE REDNESS: 0
VOMITING: 0
COLOR CHANGE: 0
ABDOMINAL PAIN: 1
APNEA: 0
VOICE CHANGE: 0
NAUSEA: 0
WHEEZING: 0

## 2022-09-09 ASSESSMENT — PAIN SCALES - GENERAL: PAINLEVEL_OUTOF10: 4

## 2022-09-09 ASSESSMENT — PAIN DESCRIPTION - LOCATION: LOCATION: ABDOMEN

## 2022-09-09 ASSESSMENT — PAIN DESCRIPTION - ORIENTATION: ORIENTATION: RIGHT;LOWER

## 2022-09-09 ASSESSMENT — PAIN - FUNCTIONAL ASSESSMENT: PAIN_FUNCTIONAL_ASSESSMENT: 0-10

## 2022-09-09 ASSESSMENT — PAIN DESCRIPTION - DESCRIPTORS: DESCRIPTORS: SHARP

## 2022-09-09 ASSESSMENT — PAIN DESCRIPTION - FREQUENCY: FREQUENCY: CONTINUOUS

## 2022-09-09 NOTE — ED PROVIDER NOTES
patient on warning signs return immediately for including but not limited to inability to tolerate oral intake, decreased urine, fevers, intractable pain. Patient verbalized understanding and is in agreement with treatment plan. Patient was discharged from our facility today in stable condition. Amount and/or Complexity of Data Reviewed  Clinical lab tests: ordered and reviewed  Tests in the radiology section of CPT®: ordered and reviewed  Review and summarize past medical records: yes  Discuss the patient with other providers: yes  Independent visualization of images, tracings, or specimens: yes    Risk of Complications, Morbidity, and/or Mortality  Presenting problems: moderate  Diagnostic procedures: moderate  Management options: moderate         Orders Placed This Encounter   Procedures    CT ABDOMEN PELVIS W IV CONTRAST Additional Contrast? None    CBC with Diff    CMP    Lipase    Lactate, Sepsis (Select if patient is over 65 to rule out mesenteric ischemia)    Diet NPO    POCT Urine Dipstick    Saline lock IV        Medications   0.9 % sodium chloride bolus (0 mLs IntraVENous Stopped 9/9/22 1224)   iopamidol (ISOVUE-370) 76 % injection 100 mL (100 mLs IntraVENous Given 9/9/22 1223)   sodium chloride flush 0.9 % injection 10 mL (10 mLs IntraVENous Given 9/9/22 1223)       Discharge Medication List as of 9/9/2022  1:03 PM        START taking these medications    Details   ondansetron (ZOFRAN) 4 MG tablet Take 1 tablet by mouth 3 times daily as needed for Nausea or Vomiting, Disp-15 tablet, R-0Print      HYDROcodone-acetaminophen (NORCO) 5-325 MG per tablet Take 1 tablet by mouth every 6 hours as needed for Pain for up to 3 days. Intended supply: 3 days.  Take lowest dose possible to manage pain, Disp-10 tablet, R-0Print      metroNIDAZOLE (FLAGYL) 500 MG tablet Take 1 tablet by mouth 2 times daily for 7 days, Disp-14 tablet, R-0Print      cefdinir (OMNICEF) 300 MG capsule Take 1 capsule by mouth 2 times daily for 7 days, Disp-14 capsule, R-0Print              Idris Fallon is a 78 y.o. male who presents to the Emergency Department with chief complaint of    Chief Complaint   Patient presents with    Abdominal Pain      56-year-old male patient with history of abdominal aortic aneurysm repair and diabetes mellitus presents today complaining of lower left quadrant abdominal pain that began last night. It is described as sharp and constant and gradually worsening. It does not radiate anywhere. He notes associated diarrhea that has been ongoing for the past 4 days. He states he was evaluated at our facility yesterday for the diarrhea but had not yet been having the abdominal pain and did not have a scan and was discharged. He denies fever, chills, melena, blood in stool, nausea, vomiting, recent travel, dysuria, urinary frequency, urgency. His last ultrasound of his aorta was over a year ago. He denies chest pain, dyspnea. Patient is primary historian and quality is reliable. Review of Systems   Constitutional:  Negative for chills, fatigue, fever and unexpected weight change. HENT:  Negative for congestion, ear pain, hearing loss, postnasal drip, rhinorrhea, sinus pressure, sore throat and voice change. Eyes:  Negative for photophobia, pain, discharge, redness and visual disturbance. Respiratory:  Negative for apnea, cough, chest tightness, shortness of breath and wheezing. Cardiovascular:  Negative for chest pain, palpitations and leg swelling. Gastrointestinal:  Positive for abdominal pain and diarrhea. Negative for blood in stool, constipation, nausea and vomiting. Endocrine: Negative for polydipsia, polyphagia and polyuria. Genitourinary:  Negative for decreased urine volume, dysuria, flank pain, frequency and hematuria. Musculoskeletal:  Negative for arthralgias, joint swelling, myalgias and neck stiffness. Skin:  Negative for color change, rash and wound.    Neurological: Negative for dizziness, syncope, speech difficulty, weakness, light-headedness and headaches. Hematological:  Negative for adenopathy. Does not bruise/bleed easily. Psychiatric/Behavioral:  Negative for confusion, self-injury, sleep disturbance and suicidal ideas. All other systems reviewed and are negative. Past Medical History:   Diagnosis Date    BPH (benign prostatic hyperplasia)     Diabetes ([address]     Heart disease     High cholesterol     Histoplasmosis     right eye    Kidney disease     Macular degeneration     legally blind        Past Surgical History:   Procedure Laterality Date    ABDOMINAL AORTIC ANEURYSM REPAIR      CATARACT REMOVAL      rigth eye    HX PARTIAL COLECTOMY      due to severe diverticulosis    OTHER SURGICAL HISTORY      stents to coronsary arteries. 2 at different times        Family History   Problem Relation Age of Onset    Abdominal aortic aneurysm Brother     Cancer Mother         esopageal        Social History     Socioeconomic History    Marital status:      Spouse name: None    Number of children: None    Years of education: None    Highest education level: None   Tobacco Use    Smoking status: Former     Packs/day: 2.00     Types: Cigarettes     Quit date: 1989     Years since quittin.7    Smokeless tobacco: Never   Substance and Sexual Activity    Alcohol use: Yes    Drug use: Never   Social History Narrative     5  5 years ago just moved in  to an independent living facility here in [place] from [facility]. He has grown kidPlumzi daughter lives in [place] lives in [place]. Does not have a Health Care POA         Patient has no known allergies.      Discharge Medication List as of 2022  1:03 PM        CONTINUE these medications which have NOT CHANGED    Details   sucralfate (CARAFATE) 1 GM tablet Take 1 tablet by mouth 4 times daily for 10 days, Disp-40 tablet, R-0Print      metoprolol succinate (TOPROL pulses. Heart sounds: Normal heart sounds. Pulmonary:      Effort: Pulmonary effort is normal. No respiratory distress. Breath sounds: Normal breath sounds. No stridor. No wheezing, rhonchi or rales. Abdominal:      General: Abdomen is protuberant. A surgical scar is present. Bowel sounds are normal. There is no distension or abdominal bruit. There are no signs of injury. Palpations: Abdomen is soft. There is no shifting dullness, fluid wave, hepatomegaly, splenomegaly, mass or pulsatile mass. Tenderness: There is abdominal tenderness in the left lower quadrant. There is no right CVA tenderness, left CVA tenderness, guarding or rebound. Negative signs include Stapleton's sign, Rovsing's sign, McBurney's sign, psoas sign and obturator sign. Hernia: No hernia is present. Comments: No pulsatile mass   Genitourinary:     Comments: Declined by patient  Musculoskeletal:         General: Normal range of motion. Cervical back: Normal range of motion and neck supple. No rigidity or tenderness. Right lower leg: No edema. Left lower leg: No edema. Lymphadenopathy:      Cervical: No cervical adenopathy. Skin:     General: Skin is warm and dry. Capillary Refill: Capillary refill takes less than 2 seconds. Coloration: Skin is not jaundiced. Neurological:      General: No focal deficit present. Mental Status: He is alert and oriented to person, place, and time. Mental status is at baseline. Psychiatric:         Mood and Affect: Mood normal.         Behavior: Behavior normal.         Thought Content: Thought content normal.         Judgment: Judgment normal.        Procedures      [unfilled]     CT ABDOMEN PELVIS W IV CONTRAST Additional Contrast? None   Final Result   Mild acute sigmoid diverticulitis.          If there are any questions about this report, I can be reached on [employer]   or at [phone]                          ED Course as of 09/09/22 1427   Fri Sep 09, 2022   1247 12:47 PM  Labs largely unremarkable. CT shows    IMPRESSION:  Mild acute sigmoid diverticulitis. [NR]      ED Course User Index  [NR] SONI Nguyen        Voice dictation software was used during the making of this note. This software is not perfect and grammatical and other typographical errors may be present. This note has not been completely proofread for errors.      Odalys Rosenberg [place]  09/09/22 Fish [address] [place]  09/09/22 Angela [address]84, [place]  09/09/22 9857

## 2022-09-09 NOTE — DISCHARGE INSTRUCTIONS
Your CT scan today showed an acute diverticulitis of your sigmoid colon. This is an infection in your large intestine. We will treat this with a course of antibiotics. We will also give you Zofran for nausea and Norco for pain. Arlys Nimesh is an opioid pain medication that has addictive properties and should only be used if your pain is severe. As we discussed I also recommend using a clear liquid diet for the first 2 days and then easing back into your diet as tolerated. This should include soups, broths, light foods such as crackers and bread. I advise following up with your primary care provider in 2 to 3 days for reassessment. Return to our department for any new or worsening symptoms.

## 2022-09-09 NOTE — ED NOTES
I have reviewed discharge instructions with the patient. The patient verbalized understanding. Patient left ED via Discharge Method: ambulatory to Home with self. Opportunity for questions and clarification provided. Patient given 4 scripts. To continue your aftercare when you leave the hospital, you may receive an automated call from our care team to check in on how you are doing. This is a free service and part of our promise to provide the best care and service to meet your aftercare needs.  If you have questions, or wish to unsubscribe from this service please call [phone]. Thank you for Choosing our [facility] Emergency Department.         Lucie Najera RN  09/09/22 3896

## 2022-09-09 NOTE — ED TRIAGE NOTES
Reports having LLQ abdominal pain that has gradually worsened and sharp in nature, onset last night. Has had several episodes of diarrhea over the last several days. States he was evaluated yesterday but did not have the this pain. Spoke with his provider this morning with thoughts of Diverticulitis. Denies n/v, urinary symptoms, fever, chills, body aches.

## 2022-09-09 NOTE — CARE COORDINATION
patient have 2 or more falls or 1 fall with injury in the past year?: No     Frequent urination at night?: No  Do you use rails/bars?: No  Do you have a non-slip tub mat?: Yes     Are you experiencing loss of meaning?: No  Are you experiencing loss of hope and peace?: No     Thinking about your patient's physical health needs, are there any symptoms or problems (risk indicators) you are unsure about that require further investigation?: No identified areas of uncertainly or problems already being investigated   Are the patients physical health problems impacting on their mental well-being?: No identified areas of concern   Are there any problems with your patients lifestyle behaviors (alcohol, drugs, diet, exercise) that are impacting on physical or mental well-being?: No identified areas of concern   Do you have any other concerns about your patients mental well-being?  How would you rate their severity and impact on the patient?: No identified areas of concern   How would you rate their home environment in terms of safety and stability (including domestic violence, insecure housing, neighbor harassment)?: Consistently safe, supportive, stable, no identified problems   How do daily activities impact on the patient's well-being? (include current or anticipated unemployment, work, caregiving, access to transportation or other): No identified problems or perceived positive benefits   How would you rate their social network (family, work, friends)?: Good participation with social networks   How would you rate their financial resources (including ability to afford all required medical care)?: Financially secure, resources adequate, no identified problems   How wells does the patient now understand their health and well-being (symptoms, signs or risk factors) and what they need to do to manage their health?: Reasonable to good understanding and already engages in managing health or is willing to undertake better management   How well do you think your patient can engage in healthcare discussions? (Barriers include language, deafness, aphasia, alcohol or drug problems, learning difficulties, concentration): Clear and open communication, no identified barriers   Do other services need to be involved to help this patient?: Other care/services not required at this time   Are current services involved with this patient well-coordinated? (Include coordination with other services you are now recommendation): All required care/services in place and well-coordinated   Suggested Interventions and Community Resources         Schedule an appointment with the patient's PCP, Set up/Review Goals, Set up/Review an Education Plan                Prior to Admission medications    Medication Sig Start Date End Date Taking? Authorizing Provider   sucralfate (CARAFATE) 1 GM tablet Take 1 tablet by mouth 4 times daily for 10 days 9/8/22 9/18/22  Cristiane Centers, MD   metoprolol succinate (TOPROL XL) 25 MG extended release tablet Take 1 tablet by mouth in the morning.  8/2/22   Dorna Severs, APRN - NP   Elyn Dubs ULTRA strip Inject 1 each into the skin daily  5/29/22   Dorna Severs, APRN - NP   B Complex Vitamins (B COMPLEX PO) Take 1 tablet by mouth daily    Historical Provider, MD   cyanocobalamin 1000 MCG tablet Take 1,000 mcg by mouth daily     Historical Provider, MD   Omega-3 Fatty Acids (FISH OIL OMEGA-3 PO) Take by mouth daily    Historical Provider, MD   Coenzyme Q10 (CO Q-10 PO) Take by mouth daily    Historical Provider, MD   empagliflozin (JARDIANCE) 25 MG tablet Take 1 tablet by mouth daily 6/9/22   Dorna Severs, APRN - NP   ezetimibe (ZETIA) 10 mg tablet Take 1 tablet by mouth daily 6/9/22   Dorna Severs, APRN - NP   glimepiride (AMARYL) 2 MG tablet Take 1 tablet by mouth daily 6/9/22   Dorna Severs, APRN - NP   insulin glargine (LANTUS;BASAGLAR) 100 UNIT/ML injection pen Inject 10 Units into the skin daily 6/9/22 XI Cramer NP   rosuvastatin (CRESTOR) 20 MG tablet Take 1 tablet by mouth daily 6/9/22   XI Cramer NP   BABY ASPIRIN PO Take by mouth daily    Ar Automatic Reconciliation       Future Appointments   Date Time Provider Josesito Silva   9/9/2022 10:20 AM XI Cramer NP PRE [facility] AMB   9/12/2022  2:45 PM Kathleen Canales MD [facility] AMB   10/20/2022  2:20 PM XI Cramer NP PRE [facility] AMB   3/7/2023  2:20 PM XI Cramer NP PRE [facility] AMB

## 2022-09-12 ENCOUNTER — OFFICE VISIT (OUTPATIENT)
Dept: UROLOGY | Age: 82
End: 2022-09-12
Payer: MEDICARE

## 2022-09-12 DIAGNOSIS — R39.14 FEELING OF INCOMPLETE BLADDER EMPTYING: ICD-10-CM

## 2022-09-12 DIAGNOSIS — N40.0 BENIGN PROSTATIC HYPERPLASIA, UNSPECIFIED WHETHER LOWER URINARY TRACT SYMPTOMS PRESENT: Primary | ICD-10-CM

## 2022-09-12 DIAGNOSIS — R33.9 URINARY RETENTION: ICD-10-CM

## 2022-09-12 LAB
BILIRUBIN, URINE, POC: NEGATIVE
BLOOD URINE, POC: NEGATIVE
GLUCOSE URINE, POC: 1000
KETONES, URINE, POC: NEGATIVE
LEUKOCYTE ESTERASE, URINE, POC: NEGATIVE
NITRITE, URINE, POC: NEGATIVE
PH, URINE, POC: 6 (ref 4.6–8)
PROTEIN,URINE, POC: NEGATIVE
PVR, POC: NORMAL CC
SPECIFIC GRAVITY, URINE, POC: 1.01 (ref 1–1.03)
URINALYSIS CLARITY, POC: NORMAL
URINALYSIS COLOR, POC: NORMAL
UROBILINOGEN, POC: NORMAL

## 2022-09-12 PROCEDURE — 99204 OFFICE O/P NEW MOD 45 MIN: CPT | Performed by: UROLOGY

## 2022-09-12 PROCEDURE — 1123F ACP DISCUSS/DSCN MKR DOCD: CPT | Performed by: UROLOGY

## 2022-09-12 PROCEDURE — 51798 US URINE CAPACITY MEASURE: CPT | Performed by: UROLOGY

## 2022-09-12 PROCEDURE — 81003 URINALYSIS AUTO W/O SCOPE: CPT | Performed by: UROLOGY

## 2022-09-12 RX ORDER — LOSARTAN POTASSIUM 25 MG/1
25 TABLET ORAL DAILY
COMMUNITY

## 2022-09-12 RX ORDER — FINASTERIDE 5 MG/1
5 TABLET, FILM COATED ORAL DAILY
Qty: 30 TABLET | Refills: 5 | Status: SHIPPED | OUTPATIENT
Start: 2022-09-12

## 2022-09-12 RX ORDER — TAMSULOSIN HYDROCHLORIDE 0.4 MG/1
0.4 CAPSULE ORAL DAILY
Qty: 30 CAPSULE | Refills: 5 | Status: SHIPPED | OUTPATIENT
Start: 2022-09-12

## 2022-09-12 ASSESSMENT — ENCOUNTER SYMPTOMS
BACK PAIN: 1
EYES NEGATIVE: 1
RESPIRATORY NEGATIVE: 1

## 2022-09-12 NOTE — PROGRESS NOTES
[address]  [address], [address]  [phone]          Barbara Calderon  : 1940    Chief Complaint   Patient presents with    Benign Prostatic Hypertrophy          HPI     Barbara Calderon is a 78 y.o. male    The patient was recently on his way to [facility] and started having fever and chills. He was admitted to the hospital in [facility] and was found to have UTI. He was an inpatient for 5 days and he was told that he was not emptying his bladder very well. There are some baseline voiding symptoms including variable stream, urgency, rare urge incontinence and nocturia 3-4. This was his first febrile UTI. Past Medical History:   Diagnosis Date    BPH (benign prostatic hyperplasia)     Diabetes ([address]     Heart disease     High cholesterol     Histoplasmosis     right eye    Kidney disease     Macular degeneration     legally blind     Past Surgical History:   Procedure Laterality Date    ABDOMINAL AORTIC ANEURYSM REPAIR      CATARACT REMOVAL      rigth eye    HX PARTIAL COLECTOMY      due to severe diverticulosis    OTHER SURGICAL HISTORY      stents to coronsary arteries. 2 at different times     Current Outpatient Medications   Medication Sig Dispense Refill    losartan (COZAAR) 25 MG tablet Take 25 mg by mouth daily      SITagliptin (JANUVIA) 100 MG tablet Take 100 mg by mouth daily Pt has written 25 mg      metoprolol succinate (TOPROL XL) 25 MG extended release tablet Take 1 tablet by mouth in the morning.  90 tablet 1    ONETOUCH ULTRA strip Inject 1 each into the skin daily       B Complex Vitamins (B COMPLEX PO) Take 1 tablet by mouth daily      cyanocobalamin 1000 MCG tablet Take 1,000 mcg by mouth daily       empagliflozin (JARDIANCE) 25 MG tablet Take 1 tablet by mouth daily 90 tablet 1    ezetimibe (ZETIA) 10 mg tablet Take 1 tablet by mouth daily 90 tablet 1    glimepiride (AMARYL) 2 MG tablet Take 1 tablet by mouth daily 90 tablet 1    insulin glargine (LANTUS;BASAGLAR) 100 UNIT/ML injection pen Inject 10 Units into the skin daily 5 pen 5    rosuvastatin (CRESTOR) 20 MG tablet Take 1 tablet by mouth daily 90 tablet 1    BABY ASPIRIN PO Take by mouth daily      ondansetron (ZOFRAN) 4 MG tablet Take 1 tablet by mouth 3 times daily as needed for Nausea or Vomiting 15 tablet 0    HYDROcodone-acetaminophen (NORCO) 5-325 MG per tablet Take 1 tablet by mouth every 6 hours as needed for Pain for up to 3 days. Intended supply: 3 days. Take lowest dose possible to manage pain 10 tablet 0    metroNIDAZOLE (FLAGYL) 500 MG tablet Take 1 tablet by mouth 2 times daily for 7 days 14 tablet 0    cefdinir (OMNICEF) 300 MG capsule Take 1 capsule by mouth 2 times daily for 7 days (Patient not taking: Reported on 2022) 14 capsule 0    sucralfate (CARAFATE) 1 GM tablet Take 1 tablet by mouth 4 times daily for 10 days 40 tablet 0    Omega-3 Fatty Acids (FISH OIL OMEGA-3 PO) Take by mouth daily      Coenzyme Q10 (CO Q-10 PO) Take by mouth daily       No current facility-administered medications for this visit. No Known Allergies  Social History     Socioeconomic History    Marital status:      Spouse name: Not on file    Number of children: Not on file    Years of education: Not on file    Highest education level: Not on file   Occupational History    Not on file   Tobacco Use    Smoking status: Former     Packs/day: 2.00     Types: Cigarettes     Quit date: 1989     Years since quittin.7    Smokeless tobacco: Never   Substance and Sexual Activity    Alcohol use: Yes    Drug use: Never    Sexual activity: Not on file   Other Topics Concern    Not on file   Social History Narrative     5  5 years ago just moved in  to an independent living facility here in [place] from [facility]. He has grown kids  Melissa Ayala daughter lives in [place] lives in [place].    Does not have a Health Care POA     Social Determinants of Health Financial Resource Strain: Not on file   Food Insecurity: Not on file   Transportation Needs: Not on file   Physical Activity: Not on file   Stress: Not on file   Social Connections: Not on file   Intimate Partner Violence: Not on file   Housing Stability: Not on file     Family History   Problem Relation Age of Onset    Abdominal aortic aneurysm Brother     Cancer Mother         esopageal       Review of Systems  Constitutional: Negative  Skin: Negative skin ROS  Eyes: Eyes negative  ENT: HENT negative  Respiratory: Respiratory negative  Cardiovascular: Positive for chest pain and leg pain. GI: Neg GI ROS  Genitourinary: Positive for erectile dysfunction. Musculoskeletal: Positive for back pain. Neurological: Neg neuro ROS  Psychological: Neg psych ROS  Endocrine: Endocrine negative  Hem/Lymphatic: Hematologic/lymphatic negative    The patient is afebrile stable vital signs. The abdomen is soft and nontender. There is a lower midline surgical scar. I think I can feel a small hernia on the ventral surface of the abdomen just to the right of the upper margin of the wound around the umbilicus. This is not tender. Extremities are without cyanosis or edema. The patient is alert and oriented x3 there is no focal neurologic deficit. Phallus is without cutaneous lesion. He is circumcised. Both testicles are descended and normal in size, shape, consistency. Rectal exam reveals a large smooth prostate.     Urinalysis  UA - Dipstick  Results for orders placed or performed in visit on 09/12/22   AMB POC URINALYSIS DIP STICK AUTO W/O MICRO   Result Value Ref Range    Color (UA POC)      Clarity (UA POC)      Glucose, Urine, POC 1000  Negative    Bilirubin, Urine, POC Negative Negative    KETONES, Urine, POC Negative Negative    Specific Gravity, Urine, POC 1.010 1.001 - 1.035    Blood (UA POC) Negative Negative    pH, Urine, POC 6.0 4.6 - 8.0    Protein, Urine, POC Negative Negative    Urobilinogen, POC Normal Nitrite, Urine, POC Negative Negative    Leukocyte Esterase, Urine, POC Negative Negative       UA - Micro  WBC - 0  RBC - 0  Bacteria - 0  Epith - 0    Postvoid residual by ultrasound is 336 cc    Assessment and Plan    ICD-10-CM    1. Benign prostatic hyperplasia, unspecified whether lower urinary tract symptoms present  N40.0 AMB POC URINALYSIS DIP STICK AUTO W/O MICRO      2. Feeling of incomplete bladder emptying  R39.14 AMB POC PVR, DOUG,POST-VOID RES,US,NON-IMAGING      3. Urinary retention  R33.9         The patient has a significant postvoid residual.  He may ultimately require TURP but I think it would be reasonable to give him a trial of an alpha-blocker and Proscar for 4 weeks or so. I will give him a prescription for Flomax. He is cautioned regarding the risk of dizziness and nasal stuffiness. He will follow-up with me in 4 weeks.

## 2022-09-14 ENCOUNTER — CARE COORDINATION (OUTPATIENT)
Dept: CARE COORDINATION | Facility: CLINIC | Age: 82
End: 2022-09-14

## 2022-09-14 NOTE — CARE COORDINATION
Ambulatory Care Coordination Note  9/14/2022    ACC: Stephane Ruiz RN    Summary Note: ccm outreached to patient. Patient states he went to the ER on 9/9/22 and was diagnosed with diverticulitis. Reviewed with patient discharge summary from ER. Patient verbalized understanding. Reviewed with patient new medications prescribed. Patient verbalized understanding and is taking medications as prescribed with no issues. Patient states he was seen by urology yesterday. Patient is eating and drinking well. States no questions or concerns. [facility] discussed that I would outreach next week. Patient agreeable. Lab Results       None            Care Coordination Interventions    Referral from Primary Care Provider: No  Suggested Interventions and Community Resources          Goals Addressed                   This Visit's Progress     Conditions and Symptoms   On track     I will schedule office visits, as directed by my provider. I will keep my appointment or reschedule if I have to cancel. I will notify my provider of any barriers to my plan of care. I will notify my provider of any symptoms that indicate a worsening of my condition. Barriers: none  Plan for overcoming my barriers: N/A  Confidence: 9/10  Anticipated Goal Completion Date: 11/9/22         Medication Management   On track     I will take my medication as directed. I will notify my provider of any problems with medications, like adverse effects or side effects. I will notify my provider/Care Coordinator if I am unable to afford my medications. I will notify my provider for advice before I stop taking any of my medication. Barriers: none  Plan for overcoming my barriers: N/A  Confidence: 9/10  Anticipated Goal Completion Date: 11/9/22              Prior to Admission medications    Medication Sig Start Date End Date Taking?  Authorizing Provider   losartan (COZAAR) 25 MG tablet Take 25 mg by mouth daily    Historical Provider, MD   SITagliptin (JANUVIA) 100 MG tablet Take 100 mg by mouth daily Pt has written 25 mg    Historical Provider, MD   tamsulosin (FLOMAX) 0.4 MG capsule Take 1 capsule by mouth daily 9/12/22   Olga Pineda MD   finasteride (PROSCAR) 5 MG tablet Take 1 tablet by mouth daily 9/12/22   Olga Pineda MD   ondansetron [facility]) 4 MG tablet Take 1 tablet by mouth 3 times daily as needed for Nausea or Vomiting 9/9/22 9/14/22  SONI Castro   metroNIDAZOLE (FLAGYL) 500 MG tablet Take 1 tablet by mouth 2 times daily for 7 days 9/9/22 9/16/22  SONI Castro   cefdinir (OMNICEF) 300 MG capsule Take 1 capsule by mouth 2 times daily for 7 days  Patient not taking: Reported on 9/12/2022 9/9/22 9/16/22  SONI Castro   sucralfate (CARAFATE) 1 GM tablet Take 1 tablet by mouth 4 times daily for 10 days 9/8/22 9/18/22  John Yeung MD   metoprolol succinate (TOPROL XL) 25 MG extended release tablet Take 1 tablet by mouth in the morning.  8/2/22   XI Rios NP   Hobert Kappa ULTRA strip Inject 1 each into the skin daily  5/29/22   XI Rios NP   B Complex Vitamins (B COMPLEX PO) Take 1 tablet by mouth daily    Historical Provider, MD   cyanocobalamin 1000 MCG tablet Take 1,000 mcg by mouth daily     Historical Provider, MD   Omega-3 Fatty Acids (FISH OIL OMEGA-3 PO) Take by mouth daily    Historical Provider, MD   Coenzyme Q10 (CO Q-10 PO) Take by mouth daily    Historical Provider, MD   empagliflozin (JARDIANCE) 25 MG tablet Take 1 tablet by mouth daily 6/9/22   XI Rios NP   ezetimibe (ZETIA) 10 mg tablet Take 1 tablet by mouth daily 6/9/22   XI Rios NP   glimepiride (AMARYL) 2 MG tablet Take 1 tablet by mouth daily 6/9/22   XI Rios NP   insulin glargine (LANTUS;BASAGLAR) 100 UNIT/ML injection pen Inject 10 Units into the skin daily 6/9/22   XI Rios NP   rosuvastatin (CRESTOR) 20 MG tablet Take 1 tablet by mouth daily 6/9/22 XI Rojas NP   BABY ASPIRIN PO Take by mouth daily    Ar Automatic Reconciliation       Future Appointments   Date Time Provider Josesito Shira   9/26/2022  4:00 PM XI Rojas NP PRE [facility] AMB   10/17/2022  9:30 AM Bobby Runner, MD [facility] AMB   10/20/2022  2:20 PM XI Rojas NP PRE [facility] AMB   3/7/2023  2:20 PM XI Rojas NP PRE [facility] AMB

## 2022-09-21 ENCOUNTER — CARE COORDINATION (OUTPATIENT)
Dept: CARE COORDINATION | Facility: CLINIC | Age: 82
End: 2022-09-21

## 2022-09-21 NOTE — CARE COORDINATION
Ambulatory Care Coordination Note  9/21/2022    ACC: Obdulio Segura, RN    Summary Note: ccm outreached to patient. Patient states he is doing well. Patient states no abdominal pain. Patient states he has follow up with pcp next week. Patient states no questions or concerns. [facility] discussed that I would outreach next week. Patient agreeable. Lab Results       None            Care Coordination Interventions    Referral from Primary Care Provider: No  Suggested Interventions and Community Resources          Goals Addressed                   This Visit's Progress     Conditions and Symptoms   On track     I will schedule office visits, as directed by my provider. I will keep my appointment or reschedule if I have to cancel. I will notify my provider of any barriers to my plan of care. I will notify my provider of any symptoms that indicate a worsening of my condition. Barriers: none  Plan for overcoming my barriers: N/A  Confidence: 9/10  Anticipated Goal Completion Date: 11/9/22         Medication Management   On track     I will take my medication as directed. I will notify my provider of any problems with medications, like adverse effects or side effects. I will notify my provider/Care Coordinator if I am unable to afford my medications. I will notify my provider for advice before I stop taking any of my medication. Barriers: none  Plan for overcoming my barriers: N/A  Confidence: 9/10  Anticipated Goal Completion Date: 11/9/22              Prior to Admission medications    Medication Sig Start Date End Date Taking?  Authorizing Provider   losartan (COZAAR) 25 MG tablet Take 25 mg by mouth daily    Historical Provider, MD   SITagliptin (JANUVIA) 100 MG tablet Take 100 mg by mouth daily Pt has written 25 mg    Historical Provider, MD   tamsulosin (FLOMAX) 0.4 MG capsule Take 1 capsule by mouth daily 9/12/22   Verna Whittington MD   finasteride (PROSCAR) 5 MG tablet Take 1 tablet by mouth daily 9/12/22 Ludmila Gutierres MD   sucralfate (CARAFATE) 1 GM tablet Take 1 tablet by mouth 4 times daily for 10 days 9/8/22 9/18/22  Phong Suarez MD   metoprolol succinate (TOPROL XL) 25 MG extended release tablet Take 1 tablet by mouth in the morning.  8/2/22   Candida Hunger, APRN - NP   Yoni Dobbs ULTRA strip Inject 1 each into the skin daily  5/29/22   Candida Hunger, APRN - NP   B Complex Vitamins (B COMPLEX PO) Take 1 tablet by mouth daily    Historical Provider, MD   cyanocobalamin 1000 MCG tablet Take 1,000 mcg by mouth daily     Historical Provider, MD   Omega-3 Fatty Acids (FISH OIL OMEGA-3 PO) Take by mouth daily    Historical Provider, MD   Coenzyme Q10 (CO Q-10 PO) Take by mouth daily    Historical Provider, MD   empagliflozin (JARDIANCE) 25 MG tablet Take 1 tablet by mouth daily 6/9/22   Candida Hunger, APRN - NP   ezetimibe (ZETIA) 10 mg tablet Take 1 tablet by mouth daily 6/9/22   Candida Hunger, APRN - NP   glimepiride (AMARYL) 2 MG tablet Take 1 tablet by mouth daily 6/9/22   Candida Hunger, APRN - NP   insulin glargine (LANTUS;BASAGLAR) 100 UNIT/ML injection pen Inject 10 Units into the skin daily 6/9/22   Candida Hunger, APRN - NP   rosuvastatin (CRESTOR) 20 MG tablet Take 1 tablet by mouth daily 6/9/22   Candida Hunger, APRN - NP   BABY ASPIRIN PO Take by mouth daily    Ar Automatic Reconciliation       Future Appointments   Date Time Provider Josesito Silva   9/26/2022  4:00 PM Candida Santiago, APRN - NP PRE [facility] AMB   10/17/2022  9:30 AM Ludmila Gutierres MD [facility] AMB   10/20/2022  2:20 PM Candida Santiago, APRN - NP PRE [facility] AMB   3/7/2023  2:20 PM Candidakirill Santiago, APRN - NP PRE [facility] AMB

## 2022-09-26 ENCOUNTER — TELEMEDICINE (OUTPATIENT)
Dept: FAMILY MEDICINE CLINIC | Facility: CLINIC | Age: 82
End: 2022-09-26
Payer: MEDICARE

## 2022-09-26 DIAGNOSIS — K57.92 DIVERTICULITIS: Primary | ICD-10-CM

## 2022-09-26 PROBLEM — I10 BENIGN ESSENTIAL HYPERTENSION: Status: ACTIVE | Noted: 2022-09-26

## 2022-09-26 PROBLEM — I73.9 PVD (PERIPHERAL VASCULAR DISEASE) (HCC): Status: ACTIVE | Noted: 2021-09-23

## 2022-09-26 PROBLEM — N17.9 ACUTE KIDNEY INJURY (HCC): Status: ACTIVE | Noted: 2022-09-26

## 2022-09-26 PROBLEM — I71.40 ABDOMINAL AORTIC ANEURYSM, WITHOUT RUPTURE: Status: ACTIVE | Noted: 2021-09-14

## 2022-09-26 PROBLEM — E78.5 DYSLIPIDEMIA: Status: ACTIVE | Noted: 2022-09-26

## 2022-09-26 PROCEDURE — 99214 OFFICE O/P EST MOD 30 MIN: CPT | Performed by: NURSE PRACTITIONER

## 2022-09-26 PROCEDURE — 1123F ACP DISCUSS/DSCN MKR DOCD: CPT | Performed by: NURSE PRACTITIONER

## 2022-09-26 RX ORDER — PEN NEEDLE, DIABETIC 31 GX5/16"
1 NEEDLE, DISPOSABLE MISCELLANEOUS DAILY
COMMUNITY
Start: 2022-08-23

## 2022-09-26 RX ORDER — CEFDINIR 300 MG/1
300 CAPSULE ORAL 2 TIMES DAILY
Qty: 14 CAPSULE | Refills: 0 | Status: ON HOLD | OUTPATIENT
Start: 2022-09-26 | End: 2022-10-04 | Stop reason: HOSPADM

## 2022-09-26 RX ORDER — METRONIDAZOLE 500 MG/1
500 TABLET ORAL 2 TIMES DAILY
Qty: 14 TABLET | Refills: 0 | Status: ON HOLD | OUTPATIENT
Start: 2022-09-26 | End: 2022-10-04 | Stop reason: HOSPADM

## 2022-09-26 SDOH — ECONOMIC STABILITY: FOOD INSECURITY: WITHIN THE PAST 12 MONTHS, YOU WORRIED THAT YOUR FOOD WOULD RUN OUT BEFORE YOU GOT MONEY TO BUY MORE.: NEVER TRUE

## 2022-09-26 SDOH — ECONOMIC STABILITY: FOOD INSECURITY: WITHIN THE PAST 12 MONTHS, THE FOOD YOU BOUGHT JUST DIDN'T LAST AND YOU DIDN'T HAVE MONEY TO GET MORE.: NEVER TRUE

## 2022-09-26 ASSESSMENT — PATIENT HEALTH QUESTIONNAIRE - PHQ9
SUM OF ALL RESPONSES TO PHQ QUESTIONS 1-9: 2
SUM OF ALL RESPONSES TO PHQ QUESTIONS 1-9: 2
6. FEELING BAD ABOUT YOURSELF - OR THAT YOU ARE A FAILURE OR HAVE LET YOURSELF OR YOUR FAMILY DOWN: 0
8. MOVING OR SPEAKING SO SLOWLY THAT OTHER PEOPLE COULD HAVE NOTICED. OR THE OPPOSITE, BEING SO FIGETY OR RESTLESS THAT YOU HAVE BEEN MOVING AROUND A LOT MORE THAN USUAL: 0
1. LITTLE INTEREST OR PLEASURE IN DOING THINGS: 0
7. TROUBLE CONCENTRATING ON THINGS, SUCH AS READING THE NEWSPAPER OR WATCHING TELEVISION: 0
9. THOUGHTS THAT YOU WOULD BE BETTER OFF DEAD, OR OF HURTING YOURSELF: 0
3. TROUBLE FALLING OR STAYING ASLEEP: 2
SUM OF ALL RESPONSES TO PHQ9 QUESTIONS 1 & 2: 0
4. FEELING TIRED OR HAVING LITTLE ENERGY: 0
10. IF YOU CHECKED OFF ANY PROBLEMS, HOW DIFFICULT HAVE THESE PROBLEMS MADE IT FOR YOU TO DO YOUR WORK, TAKE CARE OF THINGS AT HOME, OR GET ALONG WITH OTHER PEOPLE: 0
SUM OF ALL RESPONSES TO PHQ QUESTIONS 1-9: 2
5. POOR APPETITE OR OVEREATING: 0
2. FEELING DOWN, DEPRESSED OR HOPELESS: 0
SUM OF ALL RESPONSES TO PHQ QUESTIONS 1-9: 2

## 2022-09-26 ASSESSMENT — ENCOUNTER SYMPTOMS
ABDOMINAL PAIN: 1
DIARRHEA: 0
CONSTIPATION: 0

## 2022-09-26 ASSESSMENT — SOCIAL DETERMINANTS OF HEALTH (SDOH): HOW HARD IS IT FOR YOU TO PAY FOR THE VERY BASICS LIKE FOOD, HOUSING, MEDICAL CARE, AND HEATING?: NOT HARD AT ALL

## 2022-09-26 NOTE — PROGRESS NOTES
Tamiko Pitt (:  1940) is a Established patient, here for evaluation of the following:  Recurrent abdominal pain LLQ  Assessment & Plan   Below is the assessment and plan developed based on review of pertinent history, physical exam, labs, studies, and medications. 1. Diverticulitis  -     metroNIDAZOLE (FLAGYL) 500 MG tablet; Take 1 tablet by mouth 2 times daily for 7 days, Disp-14 tablet, R-0Normal  -     cefdinir (OMNICEF) 300 MG capsule; Take 1 capsule by mouth 2 times daily for 7 days, Disp-14 capsule, R-0Normal  -     [facility]  No follow-ups on file. Will retreat based on history but he is to go to er for worsening pain fever may need iv antibiotics to clear stay on clear liquids. New or worsening S.S. to er will send to gi  has had partial colectomy in past may need more removed    Subjective   HPI states after completing the antibiotics he was well but in the past 2 days has begun to feel as he did 3 weeks ago with diverticulitis has LLQ abdominal pain no fever. No nausea has gone on clear liquids already. Has been taking fiber daily not sure hy has flared again so soon after last bolt  Review of Systems   Gastrointestinal:  Positive for abdominal pain. Negative for constipation and diarrhea. Objective   Patient-Reported Vitals  No data recorded     Physical Exam     Looks well        Tamiko Pitt, was evaluated through a synchronous (real-time) audio-video encounter. The patient (or guardian if applicable) is aware that this is a billable service, which includes applicable co-pays. This Virtual Visit was conducted with patient's (and/or legal guardian's) consent. The visit was conducted pursuant to the emergency declaration under the [facility], [address] [place] [address] authority and the [employer] General Act. Patient identification was verified, and a caregiver was present when appropriate. The patient was located at Home: [address] [place] [address]. Provider was located at [facility] (Appt Dept): [id number] ,  [facility].         --XI Fraire NP

## 2022-10-01 ENCOUNTER — APPOINTMENT (OUTPATIENT)
Dept: CT IMAGING | Age: 82
DRG: 392 | End: 2022-10-01
Payer: MEDICARE

## 2022-10-01 ENCOUNTER — HOSPITAL ENCOUNTER (INPATIENT)
Age: 82
LOS: 3 days | Discharge: HOME | DRG: 392 | End: 2022-10-04
Attending: EMERGENCY MEDICINE | Admitting: INTERNAL MEDICINE
Payer: MEDICARE

## 2022-10-01 DIAGNOSIS — I73.9 PVD (PERIPHERAL VASCULAR DISEASE) (HCC): ICD-10-CM

## 2022-10-01 DIAGNOSIS — K57.92 ACUTE DIVERTICULITIS: Primary | ICD-10-CM

## 2022-10-01 LAB
ALBUMIN SERPL-MCNC: 3.6 G/DL (ref 3.2–4.6)
ALBUMIN/GLOB SERPL: 1 {RATIO} (ref 1.2–3.5)
ALP SERPL-CCNC: 70 U/L (ref 50–136)
ALT SERPL-CCNC: 16 U/L (ref 12–65)
ANION GAP SERPL CALC-SCNC: 4 MMOL/L (ref 4–13)
AST SERPL-CCNC: 10 U/L (ref 15–37)
BASOPHILS # BLD: 0 K/UL (ref 0–0.2)
BASOPHILS NFR BLD: 0 % (ref 0–2)
BILIRUB SERPL-MCNC: 0.6 MG/DL (ref 0.2–1.1)
BUN SERPL-MCNC: 14 MG/DL (ref 8–23)
CALCIUM SERPL-MCNC: 9.2 MG/DL (ref 8.3–10.4)
CHLORIDE SERPL-SCNC: 103 MMOL/L (ref 101–110)
CO2 SERPL-SCNC: 28 MMOL/L (ref 21–32)
CREAT SERPL-MCNC: 1.3 MG/DL (ref 0.8–1.5)
DIFFERENTIAL METHOD BLD: ABNORMAL
EOSINOPHIL # BLD: 0.1 K/UL (ref 0–0.8)
EOSINOPHIL NFR BLD: 1 % (ref 0.5–7.8)
ERYTHROCYTE [DISTWIDTH] IN BLOOD BY AUTOMATED COUNT: 13.2 % (ref 11.9–14.6)
GLOBULIN SER CALC-MCNC: 3.5 G/DL (ref 2.3–3.5)
GLUCOSE BLD STRIP.AUTO-MCNC: 85 MG/DL (ref 65–100)
GLUCOSE SERPL-MCNC: 201 MG/DL (ref 65–100)
HCT VFR BLD AUTO: 40.3 % (ref 41.1–50.3)
HGB BLD-MCNC: 13.5 G/DL (ref 13.6–17.2)
HGB RETIC QN AUTO: 35 PG (ref 29–35)
IMM GRANULOCYTES # BLD AUTO: 0.1 K/UL (ref 0–0.5)
IMM GRANULOCYTES NFR BLD AUTO: 1 % (ref 0–5)
IMM RETICS NFR: 9.2 % (ref 2.3–13.4)
LIPASE SERPL-CCNC: 138 U/L (ref 73–393)
LYMPHOCYTES # BLD: 0.7 K/UL (ref 0.5–4.6)
LYMPHOCYTES NFR BLD: 9 % (ref 13–44)
MCH RBC QN AUTO: 32.7 PG (ref 26.1–32.9)
MCHC RBC AUTO-ENTMCNC: 33.5 G/DL (ref 31.4–35)
MCV RBC AUTO: 97.6 FL (ref 79.6–97.8)
MONOCYTES # BLD: 0.6 K/UL (ref 0.1–1.3)
MONOCYTES NFR BLD: 7 % (ref 4–12)
NEUTS SEG # BLD: 6.6 K/UL (ref 1.7–8.2)
NEUTS SEG NFR BLD: 82 % (ref 43–78)
NRBC # BLD: 0 K/UL (ref 0–0.2)
PLATELET # BLD AUTO: 102 K/UL (ref 150–450)
PMV BLD AUTO: 11.4 FL (ref 9.4–12.3)
POTASSIUM SERPL-SCNC: 4 MMOL/L (ref 3.5–5.1)
PROT SERPL-MCNC: 7.1 G/DL (ref 6.3–8.2)
RBC # BLD AUTO: 4.13 M/UL (ref 4.23–5.6)
RETICS # AUTO: 0.06 M/UL (ref 0.03–0.1)
RETICS/RBC NFR AUTO: 1.5 % (ref 0.3–2)
SERVICE CMNT-IMP: NORMAL
SODIUM SERPL-SCNC: 135 MMOL/L (ref 138–145)
WBC # BLD AUTO: 8 K/UL (ref 4.3–11.1)

## 2022-10-01 PROCEDURE — 80053 COMPREHEN METABOLIC PANEL: CPT

## 2022-10-01 PROCEDURE — 6360000002 HC RX W HCPCS: Performed by: EMERGENCY MEDICINE

## 2022-10-01 PROCEDURE — 83605 ASSAY OF LACTIC ACID: CPT

## 2022-10-01 PROCEDURE — 1100000000 HC RM PRIVATE

## 2022-10-01 PROCEDURE — 6370000000 HC RX 637 (ALT 250 FOR IP): Performed by: INTERNAL MEDICINE

## 2022-10-01 PROCEDURE — 96365 THER/PROPH/DIAG IV INF INIT: CPT

## 2022-10-01 PROCEDURE — 86880 COOMBS TEST DIRECT: CPT

## 2022-10-01 PROCEDURE — 6360000004 HC RX CONTRAST MEDICATION: Performed by: PHYSICIAN ASSISTANT

## 2022-10-01 PROCEDURE — 36415 COLL VENOUS BLD VENIPUNCTURE: CPT

## 2022-10-01 PROCEDURE — 93005 ELECTROCARDIOGRAM TRACING: CPT | Performed by: INTERNAL MEDICINE

## 2022-10-01 PROCEDURE — 83036 HEMOGLOBIN GLYCOSYLATED A1C: CPT

## 2022-10-01 PROCEDURE — 74177 CT ABD & PELVIS W/CONTRAST: CPT

## 2022-10-01 PROCEDURE — 83010 ASSAY OF HAPTOGLOBIN QUANT: CPT

## 2022-10-01 PROCEDURE — 85046 RETICYTE/HGB CONCENTRATE: CPT

## 2022-10-01 PROCEDURE — 99285 EMERGENCY DEPT VISIT HI MDM: CPT

## 2022-10-01 PROCEDURE — 81003 URINALYSIS AUTO W/O SCOPE: CPT

## 2022-10-01 PROCEDURE — 82378 CARCINOEMBRYONIC ANTIGEN: CPT

## 2022-10-01 PROCEDURE — 83690 ASSAY OF LIPASE: CPT

## 2022-10-01 PROCEDURE — 86022 PLATELET ANTIBODIES: CPT

## 2022-10-01 PROCEDURE — 2580000003 HC RX 258: Performed by: EMERGENCY MEDICINE

## 2022-10-01 PROCEDURE — 6360000004 HC RX CONTRAST MEDICATION: Performed by: EMERGENCY MEDICINE

## 2022-10-01 PROCEDURE — 82962 GLUCOSE BLOOD TEST: CPT

## 2022-10-01 PROCEDURE — 87040 BLOOD CULTURE FOR BACTERIA: CPT

## 2022-10-01 PROCEDURE — 85025 COMPLETE CBC W/AUTO DIFF WBC: CPT

## 2022-10-01 PROCEDURE — 83615 LACTATE (LD) (LDH) ENZYME: CPT

## 2022-10-01 PROCEDURE — 2580000003 HC RX 258: Performed by: INTERNAL MEDICINE

## 2022-10-01 PROCEDURE — 96375 TX/PRO/DX INJ NEW DRUG ADDON: CPT

## 2022-10-01 RX ORDER — METOPROLOL SUCCINATE 25 MG/1
25 TABLET, EXTENDED RELEASE ORAL DAILY
Status: DISCONTINUED | OUTPATIENT
Start: 2022-10-02 | End: 2022-10-04 | Stop reason: HOSPADM

## 2022-10-01 RX ORDER — ONDANSETRON 2 MG/ML
4 INJECTION INTRAMUSCULAR; INTRAVENOUS ONCE
Status: COMPLETED | OUTPATIENT
Start: 2022-10-01 | End: 2022-10-01

## 2022-10-01 RX ORDER — TAMSULOSIN HYDROCHLORIDE 0.4 MG/1
0.4 CAPSULE ORAL DAILY
Status: DISCONTINUED | OUTPATIENT
Start: 2022-10-02 | End: 2022-10-04 | Stop reason: HOSPADM

## 2022-10-01 RX ORDER — LANOLIN ALCOHOL/MO/W.PET/CERES
1000 CREAM (GRAM) TOPICAL DAILY
Status: DISCONTINUED | OUTPATIENT
Start: 2022-10-02 | End: 2022-10-04 | Stop reason: HOSPADM

## 2022-10-01 RX ORDER — FINASTERIDE 5 MG/1
5 TABLET, FILM COATED ORAL DAILY
Status: DISCONTINUED | OUTPATIENT
Start: 2022-10-02 | End: 2022-10-04 | Stop reason: HOSPADM

## 2022-10-01 RX ORDER — ONDANSETRON 4 MG/1
4 TABLET, ORALLY DISINTEGRATING ORAL EVERY 8 HOURS PRN
Status: DISCONTINUED | OUTPATIENT
Start: 2022-10-01 | End: 2022-10-04 | Stop reason: HOSPADM

## 2022-10-01 RX ORDER — SODIUM CHLORIDE 9 MG/ML
INJECTION, SOLUTION INTRAVENOUS PRN
Status: DISCONTINUED | OUTPATIENT
Start: 2022-10-01 | End: 2022-10-04 | Stop reason: HOSPADM

## 2022-10-01 RX ORDER — ENOXAPARIN SODIUM 100 MG/ML
40 INJECTION SUBCUTANEOUS DAILY
Status: DISCONTINUED | OUTPATIENT
Start: 2022-10-02 | End: 2022-10-04 | Stop reason: HOSPADM

## 2022-10-01 RX ORDER — SODIUM CHLORIDE 0.9 % (FLUSH) 0.9 %
5-40 SYRINGE (ML) INJECTION EVERY 12 HOURS SCHEDULED
Status: DISCONTINUED | OUTPATIENT
Start: 2022-10-01 | End: 2022-10-04 | Stop reason: HOSPADM

## 2022-10-01 RX ORDER — POLYETHYLENE GLYCOL 3350 17 G/17G
17 POWDER, FOR SOLUTION ORAL DAILY PRN
Status: DISCONTINUED | OUTPATIENT
Start: 2022-10-01 | End: 2022-10-04 | Stop reason: HOSPADM

## 2022-10-01 RX ORDER — ACETAMINOPHEN 325 MG/1
650 TABLET ORAL EVERY 6 HOURS PRN
Status: DISCONTINUED | OUTPATIENT
Start: 2022-10-01 | End: 2022-10-04 | Stop reason: HOSPADM

## 2022-10-01 RX ORDER — LOSARTAN POTASSIUM 25 MG/1
25 TABLET ORAL DAILY
Status: DISCONTINUED | OUTPATIENT
Start: 2022-10-02 | End: 2022-10-04 | Stop reason: HOSPADM

## 2022-10-01 RX ORDER — SODIUM CHLORIDE 0.9 % (FLUSH) 0.9 %
5-40 SYRINGE (ML) INJECTION PRN
Status: DISCONTINUED | OUTPATIENT
Start: 2022-10-01 | End: 2022-10-04 | Stop reason: HOSPADM

## 2022-10-01 RX ORDER — DEXTROSE MONOHYDRATE 100 MG/ML
INJECTION, SOLUTION INTRAVENOUS CONTINUOUS PRN
Status: DISCONTINUED | OUTPATIENT
Start: 2022-10-01 | End: 2022-10-04 | Stop reason: HOSPADM

## 2022-10-01 RX ORDER — ONDANSETRON 2 MG/ML
4 INJECTION INTRAMUSCULAR; INTRAVENOUS EVERY 6 HOURS PRN
Status: DISCONTINUED | OUTPATIENT
Start: 2022-10-01 | End: 2022-10-04 | Stop reason: HOSPADM

## 2022-10-01 RX ORDER — INSULIN GLARGINE 100 [IU]/ML
10 INJECTION, SOLUTION SUBCUTANEOUS DAILY
Status: DISCONTINUED | OUTPATIENT
Start: 2022-10-02 | End: 2022-10-04 | Stop reason: HOSPADM

## 2022-10-01 RX ORDER — MORPHINE SULFATE 4 MG/ML
4 INJECTION INTRAVENOUS ONCE
Status: COMPLETED | OUTPATIENT
Start: 2022-10-01 | End: 2022-10-01

## 2022-10-01 RX ORDER — ACETAMINOPHEN 650 MG/1
650 SUPPOSITORY RECTAL EVERY 6 HOURS PRN
Status: DISCONTINUED | OUTPATIENT
Start: 2022-10-01 | End: 2022-10-04 | Stop reason: HOSPADM

## 2022-10-01 RX ORDER — ROSUVASTATIN CALCIUM 20 MG/1
20 TABLET, COATED ORAL DAILY
Status: DISCONTINUED | OUTPATIENT
Start: 2022-10-02 | End: 2022-10-04 | Stop reason: HOSPADM

## 2022-10-01 RX ADMIN — SODIUM CHLORIDE, PRESERVATIVE FREE 10 ML: 5 INJECTION INTRAVENOUS at 22:30

## 2022-10-01 RX ADMIN — IOPAMIDOL 100 ML: 755 INJECTION, SOLUTION INTRAVENOUS at 18:20

## 2022-10-01 RX ADMIN — DIATRIZOATE MEGLUMINE AND DIATRIZOATE SODIUM 15 ML: 600; 100 SOLUTION ORAL; RECTAL at 16:58

## 2022-10-01 RX ADMIN — ONDANSETRON 4 MG: 2 INJECTION INTRAMUSCULAR; INTRAVENOUS at 18:39

## 2022-10-01 RX ADMIN — ACETAMINOPHEN 650 MG: 325 TABLET, FILM COATED ORAL at 23:09

## 2022-10-01 RX ADMIN — MORPHINE SULFATE 4 MG: 4 INJECTION, SOLUTION INTRAMUSCULAR; INTRAVENOUS at 18:39

## 2022-10-01 RX ADMIN — PIPERACILLIN AND TAZOBACTAM 4500 MG: 4; .5 INJECTION, POWDER, FOR SOLUTION INTRAVENOUS at 19:49

## 2022-10-01 ASSESSMENT — PAIN DESCRIPTION - LOCATION
LOCATION: ABDOMEN
LOCATION: ABDOMEN

## 2022-10-01 ASSESSMENT — PAIN SCALES - GENERAL
PAINLEVEL_OUTOF10: 3
PAINLEVEL_OUTOF10: 4

## 2022-10-01 ASSESSMENT — ENCOUNTER SYMPTOMS
SORE THROAT: 0
COUGH: 0
VOMITING: 0
SHORTNESS OF BREATH: 0
NAUSEA: 0
EYE REDNESS: 0
BACK PAIN: 0
ABDOMINAL DISTENTION: 0
CONSTIPATION: 1
DIARRHEA: 0
ABDOMINAL PAIN: 1

## 2022-10-01 ASSESSMENT — PAIN - FUNCTIONAL ASSESSMENT
PAIN_FUNCTIONAL_ASSESSMENT: PREVENTS OR INTERFERES SOME ACTIVE ACTIVITIES AND ADLS
PAIN_FUNCTIONAL_ASSESSMENT: 0-10

## 2022-10-01 ASSESSMENT — PAIN DESCRIPTION - ORIENTATION
ORIENTATION: ANTERIOR
ORIENTATION: LOWER

## 2022-10-01 ASSESSMENT — PAIN DESCRIPTION - DESCRIPTORS
DESCRIPTORS: CRAMPING;SHARP
DESCRIPTORS: ACHING;CRAMPING

## 2022-10-01 NOTE — ED PROVIDER NOTES
Emergency Department Provider Note                   PCP:                XI Juan NP               Age: 78 y.o. Sex: male       ICD-10-CM    1. Abdominal pain, left lower quadrant  R10.32           DISPOSITION          MDM  Number of Diagnoses or Management Options  Diagnosis management comments: Patient is an 57-year-old male with history of diverticulitis presenting with left lower quadrant pain its been recurring over the last month. He is currently on an [address] as started by his primary provider on Monday of this past week but is not feeling his symptoms are getting any better. He is afebrile, nontoxic in appearance, vital signs within appropriate limits, he does exhibit pain with palpation of the left upper and lower quadrant with involuntary guarding. Will obtain labs and repeat CT abdomen pelvis to evaluate for any acutely worsening process. Offered medications for pain at this time but states that he is comfortable and does not wish to have anything currently.     Labs Reviewed  CBC WITH AUTO DIFFERENTIAL - Abnormal; Notable for the following components:     RBC                           4.13 (*)               Hemoglobin                    13.5 (*)               Hematocrit                    40.3 (*)               Platelets                     102 (*)                Seg Neutrophils               82 (*)                 Lymphocytes                   9 (*)               All other components within normal limits  COMPREHENSIVE METABOLIC PANEL - Abnormal; Notable for the following components:     Sodium                        135 (*)                Glucose                       201 (*)                GFR Non-      56 (*)                 AST                           10 (*)                 Albumin/Globulin Ratio        1.0 (*)             All other components within normal limits  LIPASE  POCT URINALYSIS DIPSTICK    5:45 PM  Pt signed out to Dr. Tal Camilo, who will follow up on CT results. Orders Placed This Encounter   Procedures    CT ABDOMEN PELVIS W IV CONTRAST Additional Contrast? Oral    CBC with Diff    CMP    Lipase    Diet NPO    POCT Urine Dipstick    POCT Urinalysis no Micro    Saline lock IV        Medications   iopamidol (ISOVUE-370) 76 % injection 100 mL (has no administration in time range)   diatrizoate meglumine-sodium (GASTROGRAFIN) 66-10 % solution 15 mL (15 mLs Oral Given 10/1/22 9407)       New Prescriptions    No medications on file        Rosalio Jorgensen is a 78 y.o. male who presents to the Emergency Department with chief complaint of    Chief Complaint   Patient presents with    Abdominal Pain      Patient is an 25-year-old male with history of diverticulitis, diabetes and CAD who presents with complaint of left lower abdominal pain x1 day. Patient notes that he has history of diverticulitis and had to have part of his bowel removed many years ago due to severe diverticulitis. States everything began in early September and caused him to come to the ER where he was diagnosed with sigmoid diverticulitis and someone with antibiotics. He states after taking the antibiotics pain seemed to subside until last Friday when the pain began again. He did have a virtual appointment with his primary doctor on Monday and was started back on Cipro and Flagyl. He is concerned because tomorrow is less last day for antibiotics and he is not feeling any better. He rates his pain as a 5 out of 10 states that it is sharp in nature located in the left lower quadrant. He has been taking Tylenol which seems to ease his pain slightly and he has been sticking to a liquid diet the past week. He denies any nausea, or vomiting. He denies any fever or chills. He states his last bowel movement was 1 week ago however he has had small bowel movements since, believe this was to be secondary to not eating anything more than soup for the last week.      The history is provided by the patient. Review of Systems   Constitutional:  Negative for activity change, appetite change, chills, fatigue and fever. HENT:  Negative for congestion and sore throat. Eyes:  Negative for redness. Respiratory:  Negative for cough and shortness of breath. Cardiovascular:  Negative for chest pain. Gastrointestinal:  Positive for abdominal pain (llq) and constipation. Negative for abdominal distention, diarrhea, nausea and vomiting. Genitourinary:  Negative for hematuria. Musculoskeletal:  Negative for back pain and neck pain. Skin:  Negative for rash. Neurological:  Negative for light-headedness and headaches. Psychiatric/Behavioral:  Negative for confusion. Past Medical History:   Diagnosis Date    BPH (benign prostatic hyperplasia)     Diabetes ([address]     Heart disease     High cholesterol     Histoplasmosis     right eye    Kidney disease     Macular degeneration     legally blind        Past Surgical History:   Procedure Laterality Date    ABDOMINAL AORTIC ANEURYSM REPAIR      CATARACT REMOVAL      rigth eye    HX PARTIAL COLECTOMY      due to severe diverticulosis    OTHER SURGICAL HISTORY      stents to coronsary arteries. 2 at different times        Family History   Problem Relation Age of Onset    Abdominal aortic aneurysm Brother     Cancer Mother         esopageal        Social History     Socioeconomic History    Marital status:       Spouse name: None    Number of children: None    Years of education: None    Highest education level: None   Tobacco Use    Smoking status: Former     Packs/day: 2.00     Types: Cigarettes     Quit date: 1989     Years since quittin.7    Smokeless tobacco: Never   Substance and Sexual Activity    Alcohol use: Yes    Drug use: Never   Social History Narrative     5  5 years ago just moved in  to an independent living facility here in [place] from [facility]. He has grown kids  Keren Castaneda daughter lives in [place] lives in [place]. Does not have a Health Care POA     Social Determinants of Health     Financial Resource Strain: Low Risk     Difficulty of Paying Living Expenses: Not hard at all   Food Insecurity: No Food Insecurity    Worried About Running Out of Food in the Last Year: Never true    Ran Out of Food in the Last Year: Never true         Patient has no known allergies. Previous Medications    B COMPLEX VITAMINS (B COMPLEX PO)    Take 1 tablet by mouth daily    B-D ULTRAFINE III SHORT PEN 31G X 8 MM MISC    Inject 1 each into the skin daily    BABY ASPIRIN PO    Take by mouth daily    CEFDINIR (OMNICEF) 300 MG CAPSULE    Take 1 capsule by mouth 2 times daily for 7 days    COENZYME Q10 (CO Q-10 PO)    Take by mouth daily    CYANOCOBALAMIN 1000 MCG TABLET    Take 1,000 mcg by mouth daily     EMPAGLIFLOZIN (JARDIANCE) 25 MG TABLET    Take 1 tablet by mouth daily    EZETIMIBE (ZETIA) 10 MG TABLET    Take 1 tablet by mouth daily    FINASTERIDE (PROSCAR) 5 MG TABLET    Take 1 tablet by mouth daily    GLIMEPIRIDE (AMARYL) 2 MG TABLET    Take 1 tablet by mouth daily    INSULIN GLARGINE (LANTUS;BASAGLAR) 100 UNIT/ML INJECTION PEN    Inject 10 Units into the skin daily    LOSARTAN (COZAAR) 25 MG TABLET    Take 25 mg by mouth daily    METOPROLOL SUCCINATE (TOPROL XL) 25 MG EXTENDED RELEASE TABLET    Take 1 tablet by mouth in the morning. METRONIDAZOLE (FLAGYL) 500 MG TABLET    Take 1 tablet by mouth 2 times daily for 7 days    OMEGA-3 FATTY ACIDS (FISH OIL OMEGA-3 PO)    Take by mouth daily    ONETOUCH ULTRA STRIP    Inject 1 each into the skin daily     ROSUVASTATIN (CRESTOR) 20 MG TABLET    Take 1 tablet by mouth daily    TAMSULOSIN (FLOMAX) 0.4 MG CAPSULE    Take 1 capsule by mouth daily        Vitals signs and nursing note reviewed.    Patient Vitals for the past 4 hrs:   Temp Pulse Resp BP SpO2   10/01/22 1644 -- -- -- 138/77 98 %   10/01/22 1629 -- -- -- 108/83 99 % 10/01/22 1349 97.9 °F (36.6 °C) 87 16 118/68 95 %          Physical Exam  Vitals and nursing note reviewed. Constitutional:       General: He is not in acute distress. Appearance: He is not ill-appearing or toxic-appearing. HENT:      Head: Normocephalic and atraumatic. Cardiovascular:      Rate and Rhythm: Normal rate. Heart sounds: Normal heart sounds. Pulmonary:      Effort: Pulmonary effort is normal.      Breath sounds: Normal breath sounds. Abdominal:      General: A surgical scar is present. There is no distension. Palpations: Abdomen is soft. Tenderness: There is abdominal tenderness (involuntary guarding to the LLQ) in the left upper quadrant and left lower quadrant. Skin:     General: Skin is warm and dry. Neurological:      Mental Status: He is alert and oriented to person, place, and time. Psychiatric:         Mood and Affect: Mood normal. Mood is not anxious or depressed.          Behavior: Behavior normal.        Procedures    Results for orders placed or performed during the hospital encounter of 10/01/22   CBC with Diff   Result Value Ref Range    WBC 8.0 4.3 - 11.1 K/uL    RBC 4.13 (L) 4.23 - 5.6 M/uL    Hemoglobin 13.5 (L) 13.6 - 17.2 g/dL    Hematocrit 40.3 (L) 41.1 - 50.3 %    MCV 97.6 79.6 - 97.8 FL    MCH 32.7 26.1 - 32.9 PG    MCHC 33.5 31.4 - 35.0 g/dL    RDW 13.2 11.9 - 14.6 %    Platelets 451 (L) 427 - 450 K/uL    MPV 11.4 9.4 - 12.3 FL    nRBC 0.00 0.0 - 0.2 K/uL    Differential Type AUTOMATED      Seg Neutrophils 82 (H) 43 - 78 %    Lymphocytes 9 (L) 13 - 44 %    Monocytes 7 4.0 - 12.0 %    Eosinophils % 1 0.5 - 7.8 %    Basophils 0 0.0 - 2.0 %    Immature Granulocytes 1 0.0 - 5.0 %    Segs Absolute 6.6 1.7 - 8.2 K/UL    Absolute Lymph # 0.7 0.5 - 4.6 K/UL    Absolute Mono # 0.6 0.1 - 1.3 K/UL    Absolute Eos # 0.1 0.0 - 0.8 K/UL    Basophils Absolute 0.0 0.0 - 0.2 K/UL    Absolute Immature Granulocyte 0.1 0.0 - 0.5 K/UL   CMP   Result Value Ref Range Sodium 135 (L) 138 - 145 mmol/L    Potassium 4.0 3.5 - 5.1 mmol/L    Chloride 103 101 - 110 mmol/L    CO2 28 21 - 32 mmol/L    Anion Gap 4 4 - 13 mmol/L    Glucose 201 (H) 65 - 100 mg/dL    BUN 14 8 - 23 MG/DL    Creatinine 1.30 0.8 - 1.5 MG/DL    GFR African American >60 >60 ml/min/1.73m2    GFR Non- 56 (L) >60 ml/min/1.73m2    Calcium 9.2 8.3 - 10.4 MG/DL    Total Bilirubin 0.6 0.2 - 1.1 MG/DL    ALT 16 12 - 65 U/L    AST 10 (L) 15 - 37 U/L    Alk Phosphatase 70 50 - 136 U/L    Total Protein 7.1 6.3 - 8.2 g/dL    Albumin 3.6 3.2 - 4.6 g/dL    Globulin 3.5 2.3 - 3.5 g/dL    Albumin/Globulin Ratio 1.0 (L) 1.2 - 3.5     Lipase   Result Value Ref Range    Lipase 138 73 - 393 U/L        CT ABDOMEN PELVIS W IV CONTRAST Additional Contrast? Oral    (Results Pending)                       Voice dictation software was used during the making of this note. This software is not perfect and grammatical and other typographical errors may be present. This note has not been completely proofread for errors.      Gonzalez COBOS  10/01/22 7779

## 2022-10-01 NOTE — ED NOTES
Report given to [facility], RN and care assumed at this time.       Walt Graham RN  10/01/22 6395

## 2022-10-01 NOTE — ED TRIAGE NOTES
Pt arrives via pov for lower abd pain x 1 month. Hx diverticulosis. Denies n/v/d. Reports constipation.

## 2022-10-01 NOTE — ED PROVIDER NOTES
78-year-old gentleman presenting with left lower quadrant abdominal pain and concerns about persistent diverticulitis. He has been on a couple different rounds of oral omnicef and flagyl and continues to have pain and discomfort. He said no blood in his bowels or urine. He had some intermittent chills but no fevers. No nausea, or vomiting but he has had some diarrhea. CT scan today reveals persistent diverticulitis. Given the failed outpatient treatment I think he will need to come in for IV antibiotics.          Charles Dimas MD  10/01/22 Cecil Perrin MD  10/01/22 6048

## 2022-10-02 ENCOUNTER — APPOINTMENT (OUTPATIENT)
Dept: ULTRASOUND IMAGING | Age: 82
DRG: 392 | End: 2022-10-02
Payer: MEDICARE

## 2022-10-02 ENCOUNTER — APPOINTMENT (OUTPATIENT)
Dept: NON INVASIVE DIAGNOSTICS | Age: 82
DRG: 392 | End: 2022-10-02
Payer: MEDICARE

## 2022-10-02 ENCOUNTER — APPOINTMENT (OUTPATIENT)
Dept: GENERAL RADIOLOGY | Age: 82
DRG: 392 | End: 2022-10-02
Payer: MEDICARE

## 2022-10-02 PROBLEM — I25.10 CORONARY ARTERY DISEASE INVOLVING NATIVE CORONARY ARTERY OF NATIVE HEART WITHOUT ANGINA PECTORIS: Status: ACTIVE | Noted: 2021-09-23

## 2022-10-02 LAB
ALBUMIN SERPL-MCNC: 3 G/DL (ref 3.2–4.6)
ALBUMIN/GLOB SERPL: 0.9 {RATIO} (ref 1.2–3.5)
ALP SERPL-CCNC: 61 U/L (ref 50–136)
ALT SERPL-CCNC: 14 U/L (ref 12–65)
ANION GAP SERPL CALC-SCNC: 4 MMOL/L (ref 4–13)
APPEARANCE UR: CLEAR
APTT PPP: 37.4 SEC (ref 24.1–35.1)
AST SERPL-CCNC: 6 U/L (ref 15–37)
BACTERIA URNS QL MICRO: NEGATIVE /HPF
BASOPHILS # BLD: 0 K/UL (ref 0–0.2)
BASOPHILS NFR BLD: 0 % (ref 0–2)
BILIRUB DIRECT SERPL-MCNC: 0.2 MG/DL
BILIRUB SERPL-MCNC: 0.7 MG/DL (ref 0.2–1.1)
BILIRUB UR QL: NEGATIVE
BUN SERPL-MCNC: 13 MG/DL (ref 8–23)
CALCIUM SERPL-MCNC: 8.6 MG/DL (ref 8.3–10.4)
CASTS URNS QL MICRO: ABNORMAL /LPF
CEA SERPL-MCNC: 1.7 NG/ML (ref 0–3)
CHLORIDE SERPL-SCNC: 104 MMOL/L (ref 101–110)
CO2 SERPL-SCNC: 28 MMOL/L (ref 21–32)
COLOR UR: ABNORMAL
CREAT SERPL-MCNC: 1.2 MG/DL (ref 0.8–1.5)
CREAT UR-MCNC: 47 MG/DL
DAT POLY-SP REAG RBC QL: NORMAL
DIFFERENTIAL METHOD BLD: ABNORMAL
ECHO AO ASC DIAM: 4 CM
ECHO AO ASCENDING AORTA INDEX: 2.13 CM/M2
ECHO AO ROOT DIAM: 3.8 CM
ECHO AO ROOT INDEX: 2.02 CM/M2
ECHO AV AREA PEAK VELOCITY: 3 CM2
ECHO AV AREA VTI: 3.5 CM2
ECHO AV AREA/BSA PEAK VELOCITY: 1.6 CM2/M2
ECHO AV AREA/BSA VTI: 1.9 CM2/M2
ECHO AV MEAN GRADIENT: 3 MMHG
ECHO AV MEAN VELOCITY: 0.8 M/S
ECHO AV PEAK GRADIENT: 6 MMHG
ECHO AV PEAK VELOCITY: 1.3 M/S
ECHO AV VELOCITY RATIO: 0.77
ECHO AV VTI: 22 CM
ECHO BSA: 1.88 M2
ECHO EST RA PRESSURE: 3 MMHG
ECHO IVC PROX: 1.6 CM
ECHO LA AREA 2C: 14.8 CM2
ECHO LA AREA 4C: 13.7 CM2
ECHO LA DIAMETER INDEX: 1.86 CM/M2
ECHO LA DIAMETER: 3.5 CM
ECHO LA MAJOR AXIS: 4.8 CM
ECHO LA MINOR AXIS: 5.1 CM
ECHO LA TO AORTIC ROOT RATIO: 0.92
ECHO LA VOL BP: 33 ML (ref 18–58)
ECHO LA VOL/BSA BIPLANE: 18 ML/M2 (ref 16–34)
ECHO LV E' LATERAL VELOCITY: 5 CM/S
ECHO LV E' SEPTAL VELOCITY: 5 CM/S
ECHO LV EDV A4C: 103 ML
ECHO LV EDV INDEX A4C: 55 ML/M2
ECHO LV EJECTION FRACTION A4C: 60 %
ECHO LV ESV A4C: 41 ML
ECHO LV ESV INDEX A4C: 22 ML/M2
ECHO LV FRACTIONAL SHORTENING: 29 % (ref 28–44)
ECHO LV INTERNAL DIMENSION DIASTOLE INDEX: 2.39 CM/M2
ECHO LV INTERNAL DIMENSION DIASTOLIC: 4.5 CM (ref 4.2–5.9)
ECHO LV INTERNAL DIMENSION SYSTOLIC INDEX: 1.7 CM/M2
ECHO LV INTERNAL DIMENSION SYSTOLIC: 3.2 CM
ECHO LV IVSD: 1.3 CM (ref 0.6–1)
ECHO LV MASS 2D: 198.1 G (ref 88–224)
ECHO LV MASS INDEX 2D: 105.4 G/M2 (ref 49–115)
ECHO LV POSTERIOR WALL DIASTOLIC: 1.1 CM (ref 0.6–1)
ECHO LV RELATIVE WALL THICKNESS RATIO: 0.49
ECHO LVOT AREA: 3.8 CM2
ECHO LVOT AV VTI INDEX: 0.93
ECHO LVOT DIAM: 2.2 CM
ECHO LVOT MEAN GRADIENT: 2 MMHG
ECHO LVOT PEAK GRADIENT: 4 MMHG
ECHO LVOT PEAK VELOCITY: 1 M/S
ECHO LVOT STROKE VOLUME INDEX: 41.2 ML/M2
ECHO LVOT SV: 77.5 ML
ECHO LVOT VTI: 20.4 CM
ECHO MV A VELOCITY: 1.09 M/S
ECHO MV E DECELERATION TIME (DT): 209 MS
ECHO MV E VELOCITY: 0.7 M/S
ECHO MV E/A RATIO: 0.64
ECHO MV E/E' LATERAL: 14
ECHO MV E/E' RATIO (AVERAGED): 14
ECHO MV E/E' SEPTAL: 14
ECHO PV ACCELERATION TIME (AT): 100 MS
ECHO PV MAX VELOCITY: 0.9 M/S
ECHO PV PEAK GRADIENT: 3 MMHG
ECHO RIGHT VENTRICULAR SYSTOLIC PRESSURE (RVSP): 33 MMHG
ECHO RV BASAL DIMENSION: 3.9 CM
ECHO RV TAPSE: 2 CM (ref 1.7–?)
ECHO TV REGURGITANT MAX VELOCITY: 2.72 M/S
ECHO TV REGURGITANT PEAK GRADIENT: 30 MMHG
EKG ATRIAL RATE: 81 BPM
EKG DIAGNOSIS: NORMAL
EKG P AXIS: 64 DEGREES
EKG P-R INTERVAL: 168 MS
EKG Q-T INTERVAL: 384 MS
EKG QRS DURATION: 88 MS
EKG QTC CALCULATION (BAZETT): 446 MS
EKG R AXIS: 81 DEGREES
EKG T AXIS: 76 DEGREES
EKG VENTRICULAR RATE: 81 BPM
EOSINOPHIL # BLD: 0.1 K/UL (ref 0–0.8)
EOSINOPHIL NFR BLD: 1 % (ref 0.5–7.8)
EPI CELLS #/AREA URNS HPF: ABNORMAL /HPF
ERYTHROCYTE [DISTWIDTH] IN BLOOD BY AUTOMATED COUNT: 13.2 % (ref 11.9–14.6)
EST. AVERAGE GLUCOSE BLD GHB EST-MCNC: 154 MG/DL
GLOBULIN SER CALC-MCNC: 3.4 G/DL (ref 2.3–3.5)
GLUCOSE BLD STRIP.AUTO-MCNC: 107 MG/DL (ref 65–100)
GLUCOSE BLD STRIP.AUTO-MCNC: 143 MG/DL (ref 65–100)
GLUCOSE BLD STRIP.AUTO-MCNC: 152 MG/DL (ref 65–100)
GLUCOSE BLD STRIP.AUTO-MCNC: 155 MG/DL (ref 65–100)
GLUCOSE SERPL-MCNC: 148 MG/DL (ref 65–100)
GLUCOSE UR STRIP.AUTO-MCNC: >1000 MG/DL
HBA1C MFR BLD: 7 % (ref 4.8–5.6)
HCT VFR BLD AUTO: 36.3 % (ref 41.1–50.3)
HEMOCCULT STL QL: NEGATIVE
HGB BLD-MCNC: 12.2 G/DL (ref 13.6–17.2)
HGB UR QL STRIP: NEGATIVE
IMM GRANULOCYTES # BLD AUTO: 0.1 K/UL (ref 0–0.5)
IMM GRANULOCYTES NFR BLD AUTO: 1 % (ref 0–5)
INR PPP: 1.2
KETONES UR QL STRIP.AUTO: 15 MG/DL
LACTATE SERPL-SCNC: 1 MMOL/L (ref 0.4–2)
LACTATE SERPL-SCNC: 1.2 MMOL/L (ref 0.4–2)
LDH SERPL L TO P-CCNC: 185 U/L (ref 110–210)
LEUKOCYTE ESTERASE UR QL STRIP.AUTO: NEGATIVE
LV EF: 53 %
LVEF MODALITY: ABNORMAL
LYMPHOCYTES # BLD: 0.8 K/UL (ref 0.5–4.6)
LYMPHOCYTES NFR BLD: 11 % (ref 13–44)
MCH RBC QN AUTO: 32.9 PG (ref 26.1–32.9)
MCHC RBC AUTO-ENTMCNC: 33.6 G/DL (ref 31.4–35)
MCV RBC AUTO: 97.8 FL (ref 79.6–97.8)
MONOCYTES # BLD: 0.9 K/UL (ref 0.1–1.3)
MONOCYTES NFR BLD: 12 % (ref 4–12)
MUCOUS THREADS URNS QL MICRO: 0 /LPF
NEUTS SEG # BLD: 5.8 K/UL (ref 1.7–8.2)
NEUTS SEG NFR BLD: 76 % (ref 43–78)
NITRITE UR QL STRIP.AUTO: NEGATIVE
NRBC # BLD: 0 K/UL (ref 0–0.2)
PH UR STRIP: 5.5 [PH] (ref 5–9)
PLATELET # BLD AUTO: 97 K/UL (ref 150–450)
PMV BLD AUTO: 10.4 FL (ref 9.4–12.3)
POTASSIUM SERPL-SCNC: 4 MMOL/L (ref 3.5–5.1)
PROT SERPL-MCNC: 6.4 G/DL (ref 6.3–8.2)
PROT UR STRIP-MCNC: NEGATIVE MG/DL
PROT UR-MCNC: 13 MG/DL
PROT/CREAT UR-RTO: 0.3
PROTHROMBIN TIME: 15.3 SEC (ref 12.6–14.5)
RBC # BLD AUTO: 3.71 M/UL (ref 4.23–5.6)
RBC #/AREA URNS HPF: ABNORMAL /HPF
SERVICE CMNT-IMP: ABNORMAL
SODIUM SERPL-SCNC: 136 MMOL/L (ref 138–145)
SP GR UR REFRACTOMETRY: >1.035 (ref 1–1.02)
TROPONIN I SERPL HS-MCNC: 12.1 PG/ML (ref 0–14)
TROPONIN I SERPL HS-MCNC: 14.8 PG/ML (ref 0–14)
TROPONIN I SERPL HS-MCNC: 15.8 PG/ML (ref 0–14)
URINE CULTURE IF INDICATED: ABNORMAL
UROBILINOGEN UR QL STRIP.AUTO: 0.2 EU/DL (ref 0.2–1)
WBC # BLD AUTO: 7.7 K/UL (ref 4.3–11.1)
WBC URNS QL MICRO: ABNORMAL /HPF

## 2022-10-02 PROCEDURE — 93970 EXTREMITY STUDY: CPT

## 2022-10-02 PROCEDURE — A4216 STERILE WATER/SALINE, 10 ML: HCPCS | Performed by: INTERNAL MEDICINE

## 2022-10-02 PROCEDURE — 1100000000 HC RM PRIVATE

## 2022-10-02 PROCEDURE — 36415 COLL VENOUS BLD VENIPUNCTURE: CPT

## 2022-10-02 PROCEDURE — 6360000002 HC RX W HCPCS: Performed by: INTERNAL MEDICINE

## 2022-10-02 PROCEDURE — 6360000004 HC RX CONTRAST MEDICATION: Performed by: HOSPITALIST

## 2022-10-02 PROCEDURE — 84156 ASSAY OF PROTEIN URINE: CPT

## 2022-10-02 PROCEDURE — 85025 COMPLETE CBC W/AUTO DIFF WBC: CPT

## 2022-10-02 PROCEDURE — C9113 INJ PANTOPRAZOLE SODIUM, VIA: HCPCS | Performed by: INTERNAL MEDICINE

## 2022-10-02 PROCEDURE — 6370000000 HC RX 637 (ALT 250 FOR IP): Performed by: INTERNAL MEDICINE

## 2022-10-02 PROCEDURE — 87046 STOOL CULTR AEROBIC BACT EA: CPT

## 2022-10-02 PROCEDURE — 83605 ASSAY OF LACTIC ACID: CPT

## 2022-10-02 PROCEDURE — 2580000003 HC RX 258: Performed by: HOSPITALIST

## 2022-10-02 PROCEDURE — 71045 X-RAY EXAM CHEST 1 VIEW: CPT

## 2022-10-02 PROCEDURE — 93306 TTE W/DOPPLER COMPLETE: CPT | Performed by: INTERNAL MEDICINE

## 2022-10-02 PROCEDURE — 81001 URINALYSIS AUTO W/SCOPE: CPT

## 2022-10-02 PROCEDURE — 85730 THROMBOPLASTIN TIME PARTIAL: CPT

## 2022-10-02 PROCEDURE — C8929 TTE W OR WO FOL WCON,DOPPLER: HCPCS

## 2022-10-02 PROCEDURE — 80048 BASIC METABOLIC PNL TOTAL CA: CPT

## 2022-10-02 PROCEDURE — 84484 ASSAY OF TROPONIN QUANT: CPT

## 2022-10-02 PROCEDURE — 82962 GLUCOSE BLOOD TEST: CPT

## 2022-10-02 PROCEDURE — 82272 OCCULT BLD FECES 1-3 TESTS: CPT

## 2022-10-02 PROCEDURE — 85610 PROTHROMBIN TIME: CPT

## 2022-10-02 PROCEDURE — 2580000003 HC RX 258: Performed by: INTERNAL MEDICINE

## 2022-10-02 PROCEDURE — 80076 HEPATIC FUNCTION PANEL: CPT

## 2022-10-02 RX ADMIN — PERFLUTREN 0.45 ML: 6.52 INJECTION, SUSPENSION INTRAVENOUS at 16:16

## 2022-10-02 RX ADMIN — CYANOCOBALAMIN TAB 1000 MCG 1000 MCG: 1000 TAB at 08:33

## 2022-10-02 RX ADMIN — INSULIN GLARGINE 10 UNITS: 100 INJECTION, SOLUTION SUBCUTANEOUS at 08:35

## 2022-10-02 RX ADMIN — SODIUM CHLORIDE 25 ML/HR: 9 INJECTION, SOLUTION INTRAVENOUS at 04:15

## 2022-10-02 RX ADMIN — METOPROLOL SUCCINATE 25 MG: 25 TABLET, FILM COATED, EXTENDED RELEASE ORAL at 08:33

## 2022-10-02 RX ADMIN — ENOXAPARIN SODIUM 40 MG: 100 INJECTION SUBCUTANEOUS at 08:32

## 2022-10-02 RX ADMIN — SODIUM CHLORIDE, PRESERVATIVE FREE 10 ML: 5 INJECTION INTRAVENOUS at 08:34

## 2022-10-02 RX ADMIN — PIPERACILLIN AND TAZOBACTAM 3375 MG: 3; .375 INJECTION, POWDER, FOR SOLUTION INTRAVENOUS at 20:32

## 2022-10-02 RX ADMIN — LOSARTAN POTASSIUM 25 MG: 25 TABLET, FILM COATED ORAL at 08:33

## 2022-10-02 RX ADMIN — ROSUVASTATIN CALCIUM 20 MG: 20 TABLET, FILM COATED ORAL at 08:33

## 2022-10-02 RX ADMIN — PIPERACILLIN AND TAZOBACTAM 3375 MG: 3; .375 INJECTION, POWDER, FOR SOLUTION INTRAVENOUS at 04:16

## 2022-10-02 RX ADMIN — PIPERACILLIN AND TAZOBACTAM 3375 MG: 3; .375 INJECTION, POWDER, FOR SOLUTION INTRAVENOUS at 12:03

## 2022-10-02 RX ADMIN — TAMSULOSIN HYDROCHLORIDE 0.4 MG: 0.4 CAPSULE ORAL at 08:33

## 2022-10-02 RX ADMIN — FINASTERIDE 5 MG: 5 TABLET, FILM COATED ORAL at 08:33

## 2022-10-02 RX ADMIN — ACETAMINOPHEN 650 MG: 325 TABLET, FILM COATED ORAL at 04:23

## 2022-10-02 RX ADMIN — SODIUM CHLORIDE 40 MG: 9 INJECTION INTRAMUSCULAR; INTRAVENOUS; SUBCUTANEOUS at 08:32

## 2022-10-02 RX ADMIN — ACETAMINOPHEN 650 MG: 325 TABLET, FILM COATED ORAL at 17:42

## 2022-10-02 ASSESSMENT — PAIN SCALES - GENERAL
PAINLEVEL_OUTOF10: 3
PAINLEVEL_OUTOF10: 0

## 2022-10-02 ASSESSMENT — PAIN DESCRIPTION - ORIENTATION: ORIENTATION: ANTERIOR

## 2022-10-02 ASSESSMENT — PAIN DESCRIPTION - LOCATION: LOCATION: ABDOMEN

## 2022-10-02 ASSESSMENT — PAIN DESCRIPTION - DESCRIPTORS: DESCRIPTORS: CRAMPING

## 2022-10-02 ASSESSMENT — PAIN - FUNCTIONAL ASSESSMENT: PAIN_FUNCTIONAL_ASSESSMENT: PREVENTS OR INTERFERES SOME ACTIVE ACTIVITIES AND ADLS

## 2022-10-02 NOTE — PROGRESS NOTES
Reviewed notes for new spiritual concerns.     DAUGHTER -  KOFI    LOCAL    FULL CODE    BART UNKNOWN    DEPRESSIVE DISORDER            Will follow as needed.

## 2022-10-02 NOTE — PROGRESS NOTES
TRANSFER - IN REPORT:    Verbal report received from [address] [place] [address] on Yakelin Titus  being received from er for routine progression of care      Report consisted of patient's Situation, Background, Assessment and   Recommendations(SBAR). Information from the following report(s) er summarywas reviewed with the receiving nurse. Opportunity for questions and clarification was provided. Assessment completed upon patient's arrival to unit and care assumed. Arrived awake, alert,oreinted x4. Slightly [place]. A lot of difficulty seeing. Has pacemaker/defibrillator. Skin intact. Oriented to room. 0400-O2 sats while sleeping on room air was 89-90. Placed on 2 L nc with sats up to 95%. Taking tylenol for pain. Urine sample sent to lab. No stools this shift.

## 2022-10-02 NOTE — CONSULTS
Patient seen and examined, plan reviewed. Agree with the exception of any noted changes/additions. Feeling better with IV ABX    Splenic flexure and descending diverticulitis on CT (I reviewed images) with last colonoscopy 5 years ago. Needs repeat outpatient colon 6 weeks.     May be able to advance diet tomorrow    Elisabet Quarles MD  [facility] [place]

## 2022-10-02 NOTE — CONSULTS
[facility] Consult Note       Primary GI Physician: Dr. Milton Sharma- new to us    Referring Provider: Dr. Radha Yung Date:  10/2/2022    Admit Date:  10/1/2022    Chief Complaint:  Recurrent diverticulitis/? mass    Subjective:     History of Present Illness:  Patient is a 78 y.o. male with PMH including but not limited to colon resection due to diverticulitis around 2012,  BPH, DM2, HTN, CAD, CKD stage 3, Macular Degeneration, and R eye Histoplasmosis, who is seen in consultation at the request of Dr. Jacinto Marquez for recurrent diverticulitis/ ? mass. Patient was seen in the ER on 9/9/2022 for abdominal pain. CT A/P 9/9/2022 in ER:   Impression   Mild acute sigmoid diverticulitis. He was treated for 1 week with cipro and flagyl. He initially felt better but then the pain returned. He followed up with his PCP on Monday 9/26 and was started on another course of cipro and flagyl. However, pain did not improve. He returned to the ER on 10/1/2022 for abdominal pain. CT A/P 10/1/2022:      1. Findings compatible with diverticulitis at the splenic flexure and proximal   descending colon. 2. Enlarged prostate with distended bladder. Cannot exclude bladder outlet   obstruction. Patient reports that his last BM was several days ago. He denies any melena/hematochezia. Prior to initial diagnosis of diverticulitis, he was having diarrhea. His typical bowel pattern is 1 formed BM daily. He denies any nausea/vomiting. Today, the LLQ pain is improving, per his report. Colonoscopy 2/17/2017 wit Dr. Josselyn Warner at [facility]: 4mm polyp at 63cm, unable to retrieve the polyp, pancolonic diverticulosis without inflammation, at 20cm there were 2 colon polyps 6mm and 4mm.      PMH:  Past Medical History:   Diagnosis Date    BPH (benign prostatic hyperplasia)     Diabetes ([address]     Heart disease     High cholesterol     Histoplasmosis     right eye    Kidney disease     Macular degeneration legally blind       PSH:  Past Surgical History:   Procedure Laterality Date    ABDOMINAL AORTIC ANEURYSM REPAIR      CATARACT REMOVAL      rigth eye    HX PARTIAL COLECTOMY      due to severe diverticulosis    OTHER SURGICAL HISTORY      stents to coronsary arteries. 2 at different times       Allergies:  No Known Allergies    Home Medications:  Prior to Admission medications    Medication Sig Start Date End Date Taking? Authorizing Provider   B-D ULTRAFINE III SHORT PEN 31G X 8 MM MISC Inject 1 each into the skin daily 8/23/22   XI Coronel NP   metroNIDAZOLE (FLAGYL) 500 MG tablet Take 1 tablet by mouth 2 times daily for 7 days 9/26/22 10/3/22  XI Coronel - NP   cefdinir (OMNICEF) 300 MG capsule Take 1 capsule by mouth 2 times daily for 7 days 9/26/22 10/3/22  XI Coronel NP   losartan (COZAAR) 25 MG tablet Take 25 mg by mouth daily    Historical Provider, MD   tamsulosin [facility]) 0.4 MG capsule Take 1 capsule by mouth daily 9/12/22   Lupe Galvez MD   finasteride (PROSCAR) 5 MG tablet Take 1 tablet by mouth daily 9/12/22   Lupe Galvez MD   metoprolol succinate (TOPROL XL) 25 MG extended release tablet Take 1 tablet by mouth in the morning.  8/2/22   XI Coronel NP   Brand Clore ULTRA strip Inject 1 each into the skin daily  5/29/22   XI Coronel NP   B Complex Vitamins (B COMPLEX PO) Take 1 tablet by mouth daily    Historical Provider, MD   cyanocobalamin 1000 MCG tablet Take 1,000 mcg by mouth daily     Historical Provider, MD   Omega-3 Fatty Acids (FISH OIL OMEGA-3 PO) Take by mouth daily    Historical Provider, MD   Coenzyme Q10 (CO Q-10 PO) Take by mouth daily    Historical Provider, MD   empagliflozin (JARDIANCE) 25 MG tablet Take 1 tablet by mouth daily 6/9/22   XI Coronel NP   ezetimibe (ZETIA) 10 mg tablet Take 1 tablet by mouth daily 6/9/22   XI Coronel - NP   glimepiride (AMARYL) 2 MG tablet Take 1 tablet by mouth daily 6/9/22   XI Dumont NP   insulin glargine (LANTUS;BASAGLAR) 100 UNIT/ML injection pen Inject 10 Units into the skin daily 6/9/22   XI Dumont NP   rosuvastatin (CRESTOR) 20 MG tablet Take 1 tablet by mouth daily 6/9/22   Bakari XI Sena NP   BABY ASPIRIN PO Take by mouth daily    Ar Automatic Reconciliation       [facility] Medications:  Current Facility-Administered Medications   Medication Dose Route Frequency    vitamin B-12 (CYANOCOBALAMIN) tablet 1,000 mcg  1,000 mcg Oral Daily    finasteride (PROSCAR) tablet 5 mg  5 mg Oral Daily    insulin glargine (LANTUS) injection vial 10 Units  10 Units SubCUTAneous Daily    losartan (COZAAR) tablet 25 mg  25 mg Oral Daily    metoprolol succinate (TOPROL XL) extended release tablet 25 mg  25 mg Oral Daily    rosuvastatin (CRESTOR) tablet 20 mg  20 mg Oral Daily    tamsulosin (FLOMAX) capsule 0.4 mg  0.4 mg Oral Daily    sodium chloride flush 0.9 % injection 5-40 mL  5-40 mL IntraVENous 2 times per day    sodium chloride flush 0.9 % injection 5-40 mL  5-40 mL IntraVENous PRN    0.9 % sodium chloride infusion   IntraVENous PRN    enoxaparin (LOVENOX) injection 40 mg  40 mg SubCUTAneous Daily    ondansetron (ZOFRAN-ODT) disintegrating tablet 4 mg  4 mg Oral Q8H PRN    Or    ondansetron (ZOFRAN) injection 4 mg  4 mg IntraVENous Q6H PRN    polyethylene glycol (GLYCOLAX) packet 17 g  17 g Oral Daily PRN    acetaminophen (TYLENOL) tablet 650 mg  650 mg Oral Q6H PRN    Or    acetaminophen (TYLENOL) suppository 650 mg  650 mg Rectal Q6H PRN    pantoprazole (PROTONIX) 40 mg in sodium chloride (PF) 10 mL injection  40 mg IntraVENous Daily    piperacillin-tazobactam (ZOSYN) 3,375 mg in sodium chloride 0.9 % 50 mL IVPB (mini-bag)  3,375 mg IntraVENous q8h    glucose chewable tablet 16 g  4 tablet Oral PRN    dextrose bolus 10% 125 mL  125 mL IntraVENous PRN    Or    dextrose bolus 10% 250 mL  250 mL IntraVENous PRN    glucagon (rDNA) injection 1 mg  1 mg SubCUTAneous PRN    dextrose 10 % infusion   IntraVENous Continuous PRN    influenza quadrivalent split vaccine (FLUZONE;FLUARIX;FLULAVAL;AFLURIA) injection 0.5 mL  0.5 mL IntraMUSCular Prior to discharge       Social History:  Social History     Tobacco Use    Smoking status: Former     Packs/day: 2.00     Types: Cigarettes     Quit date: 1989     Years since quittin.7    Smokeless tobacco: Never   Substance Use Topics    Alcohol use: Yes       Pt denies any history of drug use, blood transfusions, or tattoos. Family History:  Family History   Problem Relation Age of Onset    Abdominal aortic aneurysm Brother     Cancer Mother         esopageal       Review of Systems:  A detailed 10 system ROS is obtained, with pertinent positives as listed above. All others are negative. Diet:    Clear liquid  Objective:     Physical Exam:  Vitals:  /69   Pulse 80   Temp 97.7 °F (36.5 °C) (Oral)   Resp 16   Ht 5' 9\" (1.753 m)   Wt 160 lb (72.6 kg)   SpO2 97%   BMI 23.63 kg/m²   Gen:  Pt is alert, cooperative, no acute distress  Skin:  Extremities and face reveal no rashes. HEENT: Sclerae anicteric. Extra-occular muscles are intact. No oral ulcers. No abnormal pigmentation of the lips. The neck is supple. Cardiovascular: Regular rate and rhythm. No murmurs, gallops, or rubs. Respiratory:  Comfortable breathing with no accessory muscle use. Clear breath sounds anteriorly with no wheezes, rales, or rhonchi. GI:  Abdomen nondistended, soft, and TTP IN THE LUQ AND LLQ WITHOUT REBOUND, GUARDING. Normal active bowel sounds. No enlargement of the liver or spleen. No masses palpable. Rectal:  Deferred  Musculoskeletal:  No pitting edema of the lower legs. Neurological:  Gross memory appears intact. Patient is alert and oriented. Psychiatric:  Mood appears appropriate with judgement intact. Lymphatic:  No cervical or supraclavicular adenopathy.     Laboratory:    Recent Labs 10/01/22  1405 10/02/22  0523   WBC 8.0 7.7   HGB 13.5* 12.2*   HCT 40.3* 36.3*   * 97*   MCV 97.6 97.8   * 136*   K 4.0 4.0    104   CO2 28 28   BUN 14 13   AST 10* 6*   ALT 16 14   INR  --  1.2   APTT  --  37.4*        CT a/P 9/9/2022:      FINDINGS:   - LUNG BASES: No infiltrates or masses. - LIVER: Normal in size and appearance. - GALLBLADDER/BILE DUCTS: No gallstones or bile duct dilatation.   - PANCREAS: Normal.   - SPLEEN: Normal.       - ADRENALS: Normal.   - KIDNEYS/URETERS: No hydronephrosis or significant mass. - BLADDER: Distended. - REPRODUCTIVE ORGANS: Prominent prostate.       - BOWEL: Extensive sigmoid diverticulosis. Minimal pericolic edema noted   adjacent to the proximal sigmoid colon suggesting mild acute diverticulitis. No   abscess.   - LYMPH NODES: No significant retroperitoneal, mesenteric, or pelvic adenopathy.   - BONES: Old left pelvic rami fractures. No acute fracture or bone lesion.   - VASCULATURE: Distal aortic stent graft in place. Surrounding 5.3 cm aneurysm.   - OTHER: No ascites. Impression   Mild acute sigmoid diverticulitis. If there are any questions about this report, I can be reached on [employer]   or at [phone]       CT A/P 10/1/2022:   FINDINGS:       There is scarring in the lingula. There is bibasilar atelectasis. CT abdomen: The liver and spleen enhances homogeneously without discrete   lesions. There is no biliary ductal dilatation. The gallbladder, pancreas and   adrenal glands are normal. The kidneys enhance symmetrically. Bowel loops in the   upper abdomen are normal. No definite upper abdominal lymphadenopathy seen. CT pelvis: There is extensive diverticulosis with pericolonic fat stranding at   the splenic flexure and proximal descending colon. The prostate is enlarged. The   bladder is distended and. The rectum is normal. No pelvic adenopathy seen.  No   free air or free fluid seen within the pelvis. Bone window evaluation demonstrates no aggressive osseous lesions. Impression       1. Findings compatible with diverticulitis at the splenic flexure and proximal   descending colon. 2. Enlarged prostate with distended bladder. Cannot exclude bladder outlet   obstruction. Assessment:     Principal Problem:    Acute diverticulitis of intestine  Resolved Problems:    * No resolved hospital problems. *    Patient is a 78 y.o. male with PMH including but not limited to hx of colon resection due to diverticulitis, BPH, DM2, HTN, CAD, CKD stage 3, Macular Degeneration, and R eye Histoplasmosis, who is seen in consultation at the request of Dr. Sergio Garcia for recurrent diverticulitis/ ? mass. Had a CT on 9/9/2022 consistent with diverticulitis and treated with cipro and flagyl for 1 week. He felt better but symptoms returned and he was seen by PCP on 9/26 and started on another course of cipro and flagyl. However, symptoms persisted and he presented to the ER on 10/1/2022 with CT findings of diverticulitis to the splenic flexure. Last colonoscopy was in 2017 with a surgeon at [facility]. 10/1: reports improving abdominal pain. Last BM was several days ago. Plan:   Currently receiving Zosyn  BC pending  Clear liquid diet for now  Stool studies ordered  Will need Colonoscopy in 6+ weeks as an outpatient to evaluate for underlying colonic mass/IBD. Thank you for this kind consultation. XI Ayon    Patient is seen and examined in collaboration with Dr. Tamara Aceves. .  Assessment and plan as per Dr. Tamara Aceves.

## 2022-10-02 NOTE — CARE COORDINATION
Pt chart reviewed for discharge planning. LMSW met with pt. He resides alone and normally manges his ADL's but does not drive. He usually takes a taxi for transportation. All of his family reside out of state. Pt may benefits for PT/OT evals when medically stable. CM will follow pt plan of care and assist with supportive care referrals pending pt clinical progress. Please consult case management if specific needs arise. 10/02/22 6579   Service Assessment   Patient Orientation Alert and Oriented   Cognition Alert   History Provided By Patient   Primary Caregiver Self   Support Systems Friends/Neighbors  (all family are out ot state)   [address] [facility] is: Legal Next of Jaylen 67   PCP Verified by CM Yes   Last Visit to PCP Within last 3 months   Prior Functional Level Independent in ADLs/IADLs   Current Functional Level Independent in ADLs/IADLs   Can patient return to prior living arrangement Yes   Ability to make needs known: Good   Family able to assist with home care needs: No   Would you like for me to discuss the discharge plan with any other family members/significant others, and if so, who? No   Financial Resources Medicare  ([facility])   Social/Functional History   Lives With Alone   Type of [address] One level   Receives Help From [employer] No   Mode of Transportation Cab   Occupation Retired   Discharge Planning   Type of [address] [facility] Prior To Admission None   Potential Assistance Needed N/A   DME Ordered? No   Potential Assistance Purchasing Medications No   Type of Home Care Services None   Patient expects to be discharged to: House   One/Two Story Residence One story   History of falls?  1   Services At/After Discharge   Transition of Care Consult (CM Consult) Discharge Planning   Services At/After Discharge   (May need home health pending progress) [facility] Information Provided? No   Mode of Transport at Discharge Other (see comment)  (cab)   Confirm Follow Up Transport Cab   Condition of Participation: Discharge Planning   The Plan for Transition of Care is related to the following treatment goals: Pt will return home with supportive care referrals if indicated. The Patient and/or Patient Representative was provided with a Choice of Provider? Patient   The Patient and/Or Patient Representative agree with the Discharge Plan? Yes   Freedom of Choice list was provided with basic dialogue that supports the patient's individualized plan of care/goals, treatment preferences, and shares the quality data associated with the providers?   Yes

## 2022-10-02 NOTE — H&P
Hospitalist History and Physical   Admit Date:  10/1/2022  4:10 PM   Name:  Jany Joseph   Age:  78 y.o. Sex:  male  :  1940   MRN:  [id number]   Room:  ER29/29    Presenting Complaint: Abdominal Pain     Reason(s) for Admission: Acute diverticulitis of intestine [K57.92]     History of Present Illness:   Jany Joseph is a 78 y.o. male with medical history of BPH, DM2, HTN, CAD, CKD stage 3, Macular Degeneration, and R eye Histoplasmosis. The pt was in his USOH until the beginning of September when he developed abd pain with diarrhea and chills. The pt came to the [facility] ED and was dx'd with diverticulitis and started on abx with cipro and flagyl X 1 week. The pt states that he felt better after that week and was then ok for 1 week afterwards. The pt states that after this, he started having increased L sided abd pain with recurrent chills. The pt also had some nausea but no vomiting. The pt has stayed on liquid diet and states that he has had increased abd pain despite this. The pt came to the ED where CT abd revealed:    Impression       1. Findings compatible with diverticulitis at the splenic flexure and proximal   descending colon. 2. Enlarged prostate with distended bladder. Cannot exclude bladder outlet   obstruction. The pt also c/o LE swelling L > R. He states that this is intermittent. It has been associated with cramping but he attributed this to potassium def. He states that the initial episode was associated with diarrhea but this has since resolved. The pt denies any overt fevers, cough, CP, SOB HA or other sx. He is now seen at the [facility] request.      Review of Systems:  10 systems reviewed and negative except as noted in HPI. Assessment & Plan:     Principal Problem:    Acute diverticulitis of intestine      IMP:    1.) Acute Recurrent Diverticulitis - prob due to undertx'd dz, ? Mass, ? 2ndary complication.  Will start IV abx, will also obtain GI eval.     2.) Abd pain with intermittent diarrhea - prob due to above, ? Infectious etiol. Ck stool. 3.) LE Edema - ? Etiol, ck EKG, Echo, ck urine protein. 4.) BPH - continue proscar    5.) CAD - continue meds    6.) HTN - resume meds    7.) CKD stage 3 - Cr ok    8.) DM2 - SSI, ck A1C    9.) Thrombocytopenia - ? Meds, appears to be improving, ? Infectious etiol, ck LDH, Hapto    10.) H/O Mac Degeneration/Blindness    11.) H/O R eye Histoplasmosis    12.) Debility      Plan:    Admit/See orders  Ck Stool  Start Clears  Start IV abx  Ck Echo  Ck dopplers  Ck urine protein Cr ratio  Ck am labs          Anticipated discharge needs:     Home    Diet: Diet NPO  VTE ppx: LMWH  Code status: No Order    Hospital Problems:  Principal Problem:    Acute diverticulitis of intestine  Resolved Problems:    * No resolved hospital problems. *       Past History:     Past Medical History:   Diagnosis Date    BPH (benign prostatic hyperplasia)     Diabetes ([address]     Heart disease     High cholesterol     Histoplasmosis     right eye    Kidney disease     Macular degeneration     legally blind       Past Surgical History:   Procedure Laterality Date    ABDOMINAL AORTIC ANEURYSM REPAIR      CATARACT REMOVAL      rigth eye    HX PARTIAL COLECTOMY      due to severe diverticulosis    OTHER SURGICAL HISTORY      stents to coronsary arteries.  2 at different times        Social History     Tobacco Use    Smoking status: Former     Packs/day: 2.00     Types: Cigarettes     Quit date: 1989     Years since quittin.7    Smokeless tobacco: Never   Substance Use Topics    Alcohol use: Yes      Social History     Substance and Sexual Activity   Drug Use Never       Family History   Problem Relation Age of Onset    Abdominal aortic aneurysm Brother     Cancer Mother         esopageal        Immunization History   Administered Date(s) Administered    COVID-19, MODERNA BLUE border, Primary or Immunocompromised, (age 12y+), IM, 100 mcg/0.5mL 2021, 02/18/2021    COVID-19, PFIZER PURPLE top, DILUTE for use, (age 15 y+), 30mcg/0.3mL 11/10/2021    Influenza Quadv 01/05/2017, 10/06/2020    Influenza, FLUARIX, FLULAVAL, FLUZONE (age 10 mo+) AND AFLURIA, (age 1 y+), PF, 0.5mL 10/16/2018    Influenza, FLUZONE (age 72 y+), High Dose, 0.7mL 10/07/2019    Pneumococcal Conjugate 13-valent ([id number]) 04/20/2017, 10/24/2019    Pneumococcal Polysaccharide ([id number]) 01/05/2017    Tetanus 07/06/2017     No Known Allergies  Prior to Admit Medications:  Current Outpatient Medications   Medication Instructions    B Complex Vitamins (B COMPLEX PO) 1 tablet, Oral, DAILY    B-D ULTRAFINE III SHORT PEN 31G X 8 MM MISC 1 each, SubCUTAneous, DAILY    BABY ASPIRIN PO Oral, DAILY    cefdinir (OMNICEF) 300 mg, Oral, 2 TIMES DAILY    Coenzyme Q10 (CO Q-10 PO) Oral, DAILY    cyanocobalamin 1,000 mcg, Oral, DAILY    empagliflozin (JARDIANCE) 25 mg, Oral, DAILY    ezetimibe (ZETIA) 10 mg, Oral, DAILY    finasteride (PROSCAR) 5 mg, Oral, DAILY    glimepiride (AMARYL) 2 mg, Oral, DAILY    insulin glargine (LANTUS;BASAGLAR) 10 Units, SubCUTAneous, DAILY    losartan (COZAAR) 25 mg, Oral, DAILY    metoprolol succinate (TOPROL XL) 25 mg, Oral, DAILY    metroNIDAZOLE (FLAGYL) 500 mg, Oral, 2 TIMES DAILY    Omega-3 Fatty Acids (FISH OIL OMEGA-3 PO) Oral, DAILY    ONETOUCH ULTRA strip 1 each, SubCUTAneous, DAILY    rosuvastatin (CRESTOR) 20 mg, Oral, DAILY    tamsulosin (FLOMAX) 0.4 mg, Oral, DAILY         Objective:   Patient Vitals for the past 24 hrs:   Temp Pulse Resp BP SpO2   10/01/22 2058 -- -- -- -- 95 %   10/01/22 2048 -- -- -- 120/72 97 %   10/01/22 2038 -- -- -- -- 95 %   10/01/22 2028 -- -- -- 108/77 93 %   10/01/22 2018 -- -- -- (!) 120/58 --   10/01/22 2004 -- -- -- 108/60 --   10/01/22 1958 -- -- -- -- 94 %   10/01/22 1954 -- -- -- -- 94 %   10/01/22 1944 -- -- -- 118/69 --   10/01/22 1934 -- -- -- 111/66 --   10/01/22 1914 -- -- -- 125/72 --   10/01/22 1900 -- -- -- 117/66 92 % 10/01/22 1854 -- -- -- -- 90 %   10/01/22 1844 -- -- -- 119/64 90 %   10/01/22 1836 -- -- -- 131/69 97 %   10/01/22 1644 -- -- -- 138/77 98 %   10/01/22 1629 -- -- -- 108/83 99 %   10/01/22 1349 97.9 °F (36.6 °C) 87 16 118/68 95 %       Oxygen Therapy  SpO2: 95 %  O2 Device: None (Room air)    Estimated body mass index is 23.63 kg/m² as calculated from the following:    Height as of this encounter: 5' 9\" (1.753 m). Weight as of this encounter: 160 lb (72.6 kg). No intake or output data in the 24 hours ending 10/01/22 2102      Physical Exam:  Pt seen and examined 10/1/2022    Blood pressure 120/72, pulse 87, temperature 97.9 °F (36.6 °C), temperature source Oral, resp. rate 16, height 5' 9\" (1.753 m), weight 160 lb (72.6 kg), SpO2 95 %. General:    Well nourished. Head:  Normocephalic, atraumatic  Eyes:  Sclerae appear normal.  Pupils sluggish, round. ENT:  Nares appear normal, no drainage. Moist oral mucosa  Neck:  No restricted ROM. Trachea midline   CV:   Nml S1, S2, RRR  No m/r/g. No jugular venous distension. Lungs:   Diminished bases  No wheezing, rhonchi, or rales. Symmetric expansion. Abdomen: Bowel sounds present. Soft, + LLQ and LMQ tender, nondistended. Extremities: No cyanosis or clubbing.  + edema L > R  Skin:     No rashes and normal coloration. Warm and dry. Neuro:  CN II-XII grossly intact. Sensation intact.   A&Ox3  Psych:  Anxious      I have personally reviewed labs and tests showing:  Recent Labs:  Recent Results (from the past 24 hour(s))   CBC with Diff    Collection Time: 10/01/22  2:05 PM   Result Value Ref Range    WBC 8.0 4.3 - 11.1 K/uL    RBC 4.13 (L) 4.23 - 5.6 M/uL    Hemoglobin 13.5 (L) 13.6 - 17.2 g/dL    Hematocrit 40.3 (L) 41.1 - 50.3 %    MCV 97.6 79.6 - 97.8 FL    MCH 32.7 26.1 - 32.9 PG    MCHC 33.5 31.4 - 35.0 g/dL    RDW 13.2 11.9 - 14.6 %    Platelets 352 (L) 219 - 450 K/uL    MPV 11.4 9.4 - 12.3 FL    nRBC 0.00 0.0 - 0.2 K/uL    Differential Type AUTOMATED      Seg Neutrophils 82 (H) 43 - 78 %    Lymphocytes 9 (L) 13 - 44 %    Monocytes 7 4.0 - 12.0 %    Eosinophils % 1 0.5 - 7.8 %    Basophils 0 0.0 - 2.0 %    Immature Granulocytes 1 0.0 - 5.0 %    Segs Absolute 6.6 1.7 - 8.2 K/UL    Absolute Lymph # 0.7 0.5 - 4.6 K/UL    Absolute Mono # 0.6 0.1 - 1.3 K/UL    Absolute Eos # 0.1 0.0 - 0.8 K/UL    Basophils Absolute 0.0 0.0 - 0.2 K/UL    Absolute Immature Granulocyte 0.1 0.0 - 0.5 K/UL   CMP    Collection Time: 10/01/22  2:05 PM   Result Value Ref Range    Sodium 135 (L) 138 - 145 mmol/L    Potassium 4.0 3.5 - 5.1 mmol/L    Chloride 103 101 - 110 mmol/L    CO2 28 21 - 32 mmol/L    Anion Gap 4 4 - 13 mmol/L    Glucose 201 (H) 65 - 100 mg/dL    BUN 14 8 - 23 MG/DL    Creatinine 1.30 0.8 - 1.5 MG/DL    GFR African American >60 >60 ml/min/1.73m2    GFR Non- 56 (L) >60 ml/min/1.73m2    Calcium 9.2 8.3 - 10.4 MG/DL    Total Bilirubin 0.6 0.2 - 1.1 MG/DL    ALT 16 12 - 65 U/L    AST 10 (L) 15 - 37 U/L    Alk Phosphatase 70 50 - 136 U/L    Total Protein 7.1 6.3 - 8.2 g/dL    Albumin 3.6 3.2 - 4.6 g/dL    Globulin 3.5 2.3 - 3.5 g/dL    Albumin/Globulin Ratio 1.0 (L) 1.2 - 3.5     Lipase    Collection Time: 10/01/22  2:05 PM   Result Value Ref Range    Lipase 138 73 - 393 U/L       I have personally reviewed imaging studies showing:  CT ABDOMEN PELVIS W IV CONTRAST Additional Contrast? Oral    Result Date: 10/1/2022  HISTORY:  LLQ pain, h/o diverticulitis and partial bowel resection. Symptoms for 1 month. History of diverticulosis. COMPARISON: 9/9/2022 EXAM: CT abdomen and pelvis with iv contrast TECHNIQUE: Thin section axial CT was performed from the lung bases through the symphysis pubis during uneventful rapid bolus intravenous administration of 125 mL of Isovue 370. Oral contrast was also administered. Radiation dose reduction techniques were used for this study.   Our CT scanners use one or all of the following: Automated exposure control, adjustment of the mA and/or kV according to patient size, use of iterative reconstruction. FINDINGS: There is scarring in the lingula. There is bibasilar atelectasis. CT abdomen: The liver and spleen enhances homogeneously without discrete lesions. There is no biliary ductal dilatation. The gallbladder, pancreas and adrenal glands are normal. The kidneys enhance symmetrically. Bowel loops in the upper abdomen are normal. No definite upper abdominal lymphadenopathy seen. CT pelvis: There is extensive diverticulosis with pericolonic fat stranding at the splenic flexure and proximal descending colon. The prostate is enlarged. The bladder is distended and. The rectum is normal. No pelvic adenopathy seen. No free air or free fluid seen within the pelvis. Bone window evaluation demonstrates no aggressive osseous lesions. 1. Findings compatible with diverticulitis at the splenic flexure and proximal descending colon. 2. Enlarged prostate with distended bladder. Cannot exclude bladder outlet obstruction. Echocardiogram:  No results found for this or any previous visit. Orders Placed This Encounter   Medications    diatrizoate meglumine-sodium (GASTROGRAFIN) 66-10 % solution 15 mL    iopamidol (ISOVUE-370) 76 % injection 100 mL    morphine injection 4 mg    ondansetron (ZOFRAN) injection 4 mg    DISCONTD: piperacillin-tazobactam (ZOSYN) 3,375 mg in sodium chloride 0.9 % 50 mL IVPB (mini-bag)     Order Specific Question:   Antimicrobial Indications     Answer:   Sepsis of Unknown Etiology    piperacillin-tazobactam (ZOSYN) 4,500 mg in sodium chloride 0.9 % 100 mL IVPB (mini-bag)     Order Specific Question:   Antimicrobial Indications     Answer:   Sepsis of Unknown Etiology         Signed:    Sydni Chou MD, [address]  Internal Medicine - Hospitalist    Part of this note may have been written by using a voice dictation software.   The note has been proof read but may still contain some grammatical/other typographical errors.

## 2022-10-02 NOTE — ED NOTES
TRANSFER - OUT REPORT:    Verbal report given to Bhumi Robb RN on Rosie Mendoza  being transferred to 1 for routine progression of patient care       Report consisted of patient's Situation, Background, Assessment and   Recommendations(SBAR). Information from the following report(s) ED Encounter Summary was reviewed with the receiving nurse. Lines:   Peripheral IV 10/01/22 Left (Active)   Site Assessment Clean, dry & intact 10/01/22 1354   Line Status Blood return noted 10/01/22 1354        Opportunity for questions and clarification was provided.       Patient transported with:  Lauryn Ureña RN  10/01/22 4123

## 2022-10-02 NOTE — PROGRESS NOTES
Hospitalist Progress Note   Admit Date:  10/1/2022  4:10 PM   Name:  Rajat Mathur   Age:  78 y.o. Sex:  male  :  1940   MRN:  [id number]   Room:  [facility]/    Presenting Complaint: Abdominal Pain     Reason(s) for Admission: PVD (peripheral vascular disease) ([facility] [address] [I73.9]  Acute diverticulitis [K57.92]  Acute diverticulitis of intestine [facility] Course: This is a 78-year-old male with past medical history significant for BPH, diabetes mellitus type 2, hypertension, coronary artery disease, CKD stage III, macular degeneration and right eye histoplasmosis was in his usual state of health until the beginning of September when he developed abdominal pain with diarrhea and chills. He was diagnosed with diverticulitis and started on antibiotics ciprofloxacin, Flagyl for 1 week. He felt better afterwards. He started having recurrent left-sided abdominal pain with chills again. In the ER, CT scan of the abdomen and pelvis was done and showed findings concerning for diverticulitis at the splenic flexure and proximal descending colon. He was admitted for further evaluation and management. Subjective & 24hr Events (10/02/22): Patient reports nausea vomiting and abdominal pain have improved. No chest pain or shortness of breath. No fever no chills this morning. Assessment & Plan: This is a 78-year-old male with    Acute recurrent diverticulitis  GI consulted. Appreciate recommendations. Continue Zosyn. Clear liquid diet. Advance to full liquid diet. May need outpatient colonoscopy in 6 to 8 weeks. Abdominal pain with intermittent diarrhea  Stool studies ordered. Lower extremity edema  Check echocardiogram  Duplex ultrasound bilateral lower extremities negative.     BPH  Continue home medications Proscar    Coronary artery disease  Continue home medication    Hypertension  Continue home medication    CKD stage III  Creatinine at baseline    Diabetes mellitus type 2  Sliding scale insulin    Thrombocytopenia  Likely secondary to underlying infection. History of macular degeneration/blindness    History of right eye histoplasmosis    Debility      Anticipated discharge needs:    Hopefully discharge home in 48 hours. Diet:  ADULT DIET; Full Liquid  DVT PPx: Lovenox  Code status: Full Code    Hospital Problems:  Principal Problem:    Acute diverticulitis of intestine  Resolved Problems:    * No resolved hospital problems. *      Objective:   Patient Vitals for the past 24 hrs:   Temp Pulse Resp BP SpO2   10/02/22 1548 98.1 °F (36.7 °C) 71 16 (!) 102/59 91 %   10/02/22 1138 98.1 °F (36.7 °C) 72 18 104/64 94 %   10/02/22 0738 97.7 °F (36.5 °C) 80 16 109/69 97 %   10/02/22 0430 -- -- -- -- 95 %   10/02/22 0423 -- -- -- -- 90 %   10/02/22 0413 98.2 °F (36.8 °C) 91 20 115/70 92 %   10/01/22 2145 98.3 °F (36.8 °C) 84 18 (!) 123/59 94 %   10/01/22 2058 -- -- -- -- 95 %   10/01/22 2056 -- -- -- 112/69 95 %   10/01/22 2048 -- -- -- 120/72 97 %   10/01/22 2041 -- -- -- -- 98 %   10/01/22 2038 -- -- -- -- 95 %   10/01/22 2028 -- -- -- 108/77 93 %   10/01/22 2018 -- -- -- (!) 120/58 --   10/01/22 2004 -- -- -- 108/60 --   10/01/22 1958 -- -- -- -- 94 %   10/01/22 1954 -- -- -- -- 94 %   10/01/22 1944 -- -- -- 118/69 --   10/01/22 1934 -- -- -- 111/66 --   10/01/22 1914 -- -- -- 125/72 --   10/01/22 1900 -- -- -- 117/66 92 %   10/01/22 1854 -- -- -- -- 90 %   10/01/22 1844 -- -- -- 119/64 90 %   10/01/22 1836 -- -- -- 131/69 97 %   10/01/22 1644 -- -- -- 138/77 98 %       Oxygen Therapy  SpO2: 91 %  O2 Device: Nasal cannula  O2 Flow Rate (L/min): 2 L/min    Estimated body mass index is 23.63 kg/m² as calculated from the following:    Height as of this encounter: 5' 9\" (1.753 m). Weight as of this encounter: 160 lb (72.6 kg).     Intake/Output Summary (Last 24 hours) at 10/2/2022 1633  Last data filed at 10/2/2022 1138  Gross per 24 hour   Intake 240 ml   Output 900 ml   Net -660 ml Physical Exam:     Blood pressure (!) 102/59, pulse 71, temperature 98.1 °F (36.7 °C), temperature source Oral, resp. rate 16, height 5' 9\" (1.753 m), weight 160 lb (72.6 kg), SpO2 91 %. General:    Well nourished. Alert awake male, ill-appearing,  Head:  Normocephalic, atraumatic  Eyes:  Sclerae appear normal.  Pupils equally round. ENT:  Nares appear normal, no drainage. Moist oral mucosa  Neck:  No restricted ROM. Trachea midline   CV:   RRR. No m/r/g. No jugular venous distension. Lungs:   CTAB. No wheezing, rhonchi, or rales. Symmetric expansion. Abdomen: Bowel sounds present. Soft, left lower quadrant tenderness, nondistended. Extremities: No cyanosis or clubbing. No edema  Skin:     No rashes and normal coloration. Warm and dry. Neuro:  CN II-XII grossly intact. Sensation intact. A&Ox3  Psych:  Normal mood and affect.       I have personally reviewed labs and tests showing:  Recent Labs:  Recent Results (from the past 48 hour(s))   CBC with Diff    Collection Time: 10/01/22  2:05 PM   Result Value Ref Range    WBC 8.0 4.3 - 11.1 K/uL    RBC 4.13 (L) 4.23 - 5.6 M/uL    Hemoglobin 13.5 (L) 13.6 - 17.2 g/dL    Hematocrit 40.3 (L) 41.1 - 50.3 %    MCV 97.6 79.6 - 97.8 FL    MCH 32.7 26.1 - 32.9 PG    MCHC 33.5 31.4 - 35.0 g/dL    RDW 13.2 11.9 - 14.6 %    Platelets 644 (L) 743 - 450 K/uL    MPV 11.4 9.4 - 12.3 FL    nRBC 0.00 0.0 - 0.2 K/uL    Differential Type AUTOMATED      Seg Neutrophils 82 (H) 43 - 78 %    Lymphocytes 9 (L) 13 - 44 %    Monocytes 7 4.0 - 12.0 %    Eosinophils % 1 0.5 - 7.8 %    Basophils 0 0.0 - 2.0 %    Immature Granulocytes 1 0.0 - 5.0 %    Segs Absolute 6.6 1.7 - 8.2 K/UL    Absolute Lymph # 0.7 0.5 - 4.6 K/UL    Absolute Mono # 0.6 0.1 - 1.3 K/UL    Absolute Eos # 0.1 0.0 - 0.8 K/UL    Basophils Absolute 0.0 0.0 - 0.2 K/UL    Absolute Immature Granulocyte 0.1 0.0 - 0.5 K/UL   CMP    Collection Time: 10/01/22  2:05 PM   Result Value Ref Range    Sodium 135 (L) 138 - 145 mmol/L    Potassium 4.0 3.5 - 5.1 mmol/L    Chloride 103 101 - 110 mmol/L    CO2 28 21 - 32 mmol/L    Anion Gap 4 4 - 13 mmol/L    Glucose 201 (H) 65 - 100 mg/dL    BUN 14 8 - 23 MG/DL    Creatinine 1.30 0.8 - 1.5 MG/DL    GFR African American >60 >60 ml/min/1.73m2    GFR Non- 56 (L) >60 ml/min/1.73m2    Calcium 9.2 8.3 - 10.4 MG/DL    Total Bilirubin 0.6 0.2 - 1.1 MG/DL    ALT 16 12 - 65 U/L    AST 10 (L) 15 - 37 U/L    Alk Phosphatase 70 50 - 136 U/L    Total Protein 7.1 6.3 - 8.2 g/dL    Albumin 3.6 3.2 - 4.6 g/dL    Globulin 3.5 2.3 - 3.5 g/dL    Albumin/Globulin Ratio 1.0 (L) 1.2 - 3.5     Lipase    Collection Time: 10/01/22  2:05 PM   Result Value Ref Range    Lipase 138 73 - 393 U/L   EKG 12 lead    Collection Time: 10/01/22 10:05 PM   Result Value Ref Range    Ventricular Rate 81 BPM    Atrial Rate 81 BPM    P-R Interval 168 ms    QRS Duration 88 ms    Q-T Interval 384 ms    QTc Calculation (Bazett) 446 ms    P Axis 64 degrees    R Axis 81 degrees    T Axis 76 degrees    Diagnosis Normal sinus rhythm    POCT Glucose    Collection Time: 10/01/22 10:34 PM   Result Value Ref Range    POC Glucose 85 65 - 100 mg/dL    Performed by: Dari    Culture, Blood 1    Collection Time: 10/01/22 10:52 PM    Specimen: Blood   Result Value Ref Range    Special Requests RIGHT  Antecubital        Culture NO GROWTH AFTER 6 HOURS     Lactic Acid    Collection Time: 10/01/22 10:52 PM   Result Value Ref Range    Lactic Acid, Plasma 1.2 0.4 - 2.0 MMOL/L   Hemoglobin A1C    Collection Time: 10/01/22 10:52 PM   Result Value Ref Range    Hemoglobin A1C 7.0 (H) 4.8 - 5.6 %    eAG 154 mg/dL   Lactate Dehydrogenase    Collection Time: 10/01/22 10:52 PM   Result Value Ref Range     110 - 210 U/L   Reticulocytes    Collection Time: 10/01/22 10:52 PM   Result Value Ref Range    Reticulocyte Count,Automated 1.5 0.3 - 2.0 %    Absolute Retic # 0.0616 0.026 - 0.095 M/ul    Immature Retic Fraction 9.2 2.3 - 13.4 %    Retic Hemoglobin conc. 35 29 - 35 pg   DIRECT ANTIGLOBULIN TEST    Collection Time: 10/01/22 10:52 PM   Result Value Ref Range    Polyspecific Navneet NEG    Path Review, Smear    Collection Time: 10/01/22 10:52 PM   Result Value Ref Range    Pathologist Review PENDING    CEA    Collection Time: 10/01/22 10:52 PM   Result Value Ref Range    CEA 1.7 0.0 - 3.0 ng/mL   Culture, Blood 1    Collection Time: 10/01/22 11:03 PM    Specimen: Blood   Result Value Ref Range    Special Requests LEFT  HAND        Culture NO GROWTH AFTER 6 HOURS     Troponin    Collection Time: 10/02/22  1:12 AM   Result Value Ref Range    Troponin, High Sensitivity 15.8 (H) 0 - 14 pg/mL   Protein / creatinine ratio, urine    Collection Time: 10/02/22  4:27 AM   Result Value Ref Range    Protein, Urine, Random 13 (H) <11.9 mg/dL    Creatinine, Ur 47.00 mg/dL    PROTEIN/CREAT RATIO URINE RAN 0.3     Urinalysis with Reflex to Culture    Collection Time: 10/02/22  4:27 AM    Specimen: Urine   Result Value Ref Range    Color, UA YELLOW/STRAW      Appearance CLEAR      Specific Gravity, UA >1.035 (H) 1.001 - 1.023    pH, Urine 5.5 5.0 - 9.0      Protein, UA Negative NEG mg/dL    Glucose, UA >1000 mg/dL    Ketones, Urine 15 (A) NEG mg/dL    Bilirubin Urine Negative NEG      Blood, Urine Negative NEG      Urobilinogen, Urine 0.2 0.2 - 1.0 EU/dL    Nitrite, Urine Negative NEG      Leukocyte Esterase, Urine Negative NEG      Urine Culture if Indicated CULTURE NOT INDICATED BY UA RESULT      WBC, UA 0-4 (A) NORM /hpf    RBC, UA 0-5 (A) NORM /hpf    BACTERIA, URINE Negative (A) NORM /hpf    Epithelial Cells UA 0-5 (A) NORM /hpf    Casts 0-2 (A) NORM /lpf    Mucus, UA 0 0 /lpf   Basic Metabolic Panel w/ Reflex to MG    Collection Time: 10/02/22  5:23 AM   Result Value Ref Range    Sodium 136 (L) 138 - 145 mmol/L    Potassium 4.0 3.5 - 5.1 mmol/L    Chloride 104 101 - 110 mmol/L    CO2 28 21 - 32 mmol/L    Anion Gap 4 4 - 13 mmol/L Glucose 148 (H) 65 - 100 mg/dL    BUN 13 8 - 23 MG/DL    Creatinine 1.20 0.8 - 1.5 MG/DL    GFR African American >60 >60 ml/min/1.73m2    GFR Non- >60 >60 ml/min/1.73m2    Calcium 8.6 8.3 - 10.4 MG/DL   Hepatic function panel    Collection Time: 10/02/22  5:23 AM   Result Value Ref Range    Total Protein 6.4 6.3 - 8.2 g/dL    Albumin 3.0 (L) 3.2 - 4.6 g/dL    Globulin 3.4 2.3 - 3.5 g/dL    Albumin/Globulin Ratio 0.9 (L) 1.2 - 3.5      Total Bilirubin 0.7 0.2 - 1.1 MG/DL    Bilirubin, Direct 0.2 <0.4 MG/DL    Alk Phosphatase 61 50 - 136 U/L    AST 6 (L) 15 - 37 U/L    ALT 14 12 - 65 U/L   Lactic Acid    Collection Time: 10/02/22  5:23 AM   Result Value Ref Range    Lactic Acid, Plasma 1.0 0.4 - 2.0 MMOL/L   CBC with Auto Differential    Collection Time: 10/02/22  5:23 AM   Result Value Ref Range    WBC 7.7 4.3 - 11.1 K/uL    RBC 3.71 (L) 4.23 - 5.6 M/uL    Hemoglobin 12.2 (L) 13.6 - 17.2 g/dL    Hematocrit 36.3 (L) 41.1 - 50.3 %    MCV 97.8 79.6 - 97.8 FL    MCH 32.9 26.1 - 32.9 PG    MCHC 33.6 31.4 - 35.0 g/dL    RDW 13.2 11.9 - 14.6 %    Platelets 97 (L) 483 - 450 K/uL    MPV 10.4 9.4 - 12.3 FL    nRBC 0.00 0.0 - 0.2 K/uL    Differential Type AUTOMATED      Seg Neutrophils 76 43 - 78 %    Lymphocytes 11 (L) 13 - 44 %    Monocytes 12 4.0 - 12.0 %    Eosinophils % 1 0.5 - 7.8 %    Basophils 0 0.0 - 2.0 %    Immature Granulocytes 1 0.0 - 5.0 %    Segs Absolute 5.8 1.7 - 8.2 K/UL    Absolute Lymph # 0.8 0.5 - 4.6 K/UL    Absolute Mono # 0.9 0.1 - 1.3 K/UL    Absolute Eos # 0.1 0.0 - 0.8 K/UL    Basophils Absolute 0.0 0.0 - 0.2 K/UL    Absolute Immature Granulocyte 0.1 0.0 - 0.5 K/UL   Protime-INR    Collection Time: 10/02/22  5:23 AM   Result Value Ref Range    Protime 15.3 (H) 12.6 - 14.5 sec    INR 1.2     APTT    Collection Time: 10/02/22  5:23 AM   Result Value Ref Range    PTT 37.4 (H) 24.1 - 35.1 SEC   Troponin    Collection Time: 10/02/22  5:23 AM   Result Value Ref Range    Troponin, High Sensitivity 14.8 (H) 0 - 14 pg/mL   POCT Glucose    Collection Time: 10/02/22  7:56 AM   Result Value Ref Range    POC Glucose 107 (H) 65 - 100 mg/dL    Performed by: Alecia    Troponin    Collection Time: 10/02/22  9:05 AM   Result Value Ref Range    Troponin, High Sensitivity 12.1 0 - 14 pg/mL   POCT Glucose    Collection Time: 10/02/22 11:38 AM   Result Value Ref Range    POC Glucose 143 (H) 65 - 100 mg/dL    Performed by: Alecia    Occult Blood, Fecal    Collection Time: 10/02/22  2:10 PM   Result Value Ref Range    POC Occult Blood, Fecal Negative NEG     Transthoracic echocardiogram (TTE) complete with contrast, bubble, strain, and 3D PRN    Collection Time: 10/02/22  4:16 PM   Result Value Ref Range    LA Minor Axis 5.1 cm    LA Major Gravity 4.8 cm    LA Area 2C 14.8 cm2    LA Area 4C 13.7 cm2    LA Volume BP 33 18 - 58 mL    LA Diameter 3.5 cm    LV EDV A4C 103 mL    LV ESV A4C 41 mL    IVSd 1.3 (A) 0.6 - 1.0 cm    LVIDd 4.5 4.2 - 5.9 cm    LVIDs 3.2 cm    LVOT Diameter 2.2 cm    LVOT Mean Gradient 2 mmHg    LVOT VTI 20.4 cm    LVOT Peak Velocity 1.0 m/s    LVOT Peak Gradient 4 mmHg    LVPWd 1.1 (A) 0.6 - 1.0 cm    LV E' Lateral Velocity 5 cm/s    LV E' Septal Velocity 5 cm/s    LV Ejection Fraction A4C 60 %    LVOT Area 3.8 cm2    LVOT SV 77.5 ml    AV Mean Velocity 0.8 m/s    AV Mean Gradient 3 mmHg    AV VTI 22.0 cm    AV Peak Velocity 1.3 m/s    AV Peak Gradient 6 mmHg    AV Area by VTI 3.5 cm2    AV Area by Peak Velocity 3.0 cm2    Aortic Root 3.8 cm    Ascending Aorta 4.0 cm    IVC Proxmal 1.6 cm    MV E Wave Deceleration Time 209.0 ms    MV A Velocity 1.09 m/s    MV E Velocity 0.70 m/s    PV .0 ms    PV Max Velocity 0.9 m/s    PV Peak Gradient 3 mmHg    RV Basal Dimension 3.9 cm    TAPSE 2.0 1.7 cm    TR Max Velocity 2.72 m/s    TR Peak Gradient 30 mmHg    Body Surface Area 1.88 m2    Fractional Shortening 2D 29 28 - 44 %    LV ESV Index A4C 22 mL/m2    LV EDV Index A4C 55 mL/m2    LVIDd Index 2.39 cm/m2    LVIDs Index 1.70 cm/m2    LV RWT Ratio 0.49     LV Mass 2D 198.1 88 - 224 g    LV Mass 2D Index 105.4 49 - 115 g/m2    MV E/A 0.64     E/E' Ratio (Averaged) 14.00     E/E' Lateral 14.00     E/E' Septal 14.00     LA Volume Index BP 18 16 - 34 ml/m2    LVOT Stroke Volume Index 41.2 mL/m2    LA Size Index 1.86 cm/m2    LA/AO Root Ratio 0.92     Ao Root Index 2.02 cm/m2    Ascending Aorta Index 2.13 cm/m2    AV Velocity Ratio 0.77     LVOT:AV VTI Index 0.93     KAVITA/BSA VTI 1.9 cm2/m2    KAVITA/BSA Peak Velocity 1.6 cm2/m2    Est. RA Pressure 3 mmHg    RVSP 33 mmHg       I have personally reviewed imaging studies showing: Other Studies:  XR CHEST PORTABLE   Final Result   No evidence of an acute intrathoracic process. Vascular duplex lower extremity venous bilateral   Final Result   No evidence of deep venous thrombosis in the bilateral lower extremities. CT ABDOMEN PELVIS W IV CONTRAST Additional Contrast? Oral   Final Result      1. Findings compatible with diverticulitis at the splenic flexure and proximal   descending colon. 2. Enlarged prostate with distended bladder. Cannot exclude bladder outlet   obstruction.                    Current Meds:  Current Facility-Administered Medications   Medication Dose Route Frequency    perflutren lipid microspheres syringe  0.45 mL IntraVENous PRN    vitamin B-12 (CYANOCOBALAMIN) tablet 1,000 mcg  1,000 mcg Oral Daily    finasteride (PROSCAR) tablet 5 mg  5 mg Oral Daily    insulin glargine (LANTUS) injection vial 10 Units  10 Units SubCUTAneous Daily    losartan (COZAAR) tablet 25 mg  25 mg Oral Daily    metoprolol succinate (TOPROL XL) extended release tablet 25 mg  25 mg Oral Daily    rosuvastatin (CRESTOR) tablet 20 mg  20 mg Oral Daily    tamsulosin (FLOMAX) capsule 0.4 mg  0.4 mg Oral Daily    sodium chloride flush 0.9 % injection 5-40 mL  5-40 mL IntraVENous 2 times per day    sodium chloride flush 0.9 % injection 5-40 mL 5-40 mL IntraVENous PRN    0.9 % sodium chloride infusion   IntraVENous PRN    enoxaparin (LOVENOX) injection 40 mg  40 mg SubCUTAneous Daily    ondansetron (ZOFRAN-ODT) disintegrating tablet 4 mg  4 mg Oral Q8H PRN    Or    ondansetron (ZOFRAN) injection 4 mg  4 mg IntraVENous Q6H PRN    polyethylene glycol (GLYCOLAX) packet 17 g  17 g Oral Daily PRN    acetaminophen (TYLENOL) tablet 650 mg  650 mg Oral Q6H PRN    Or    acetaminophen (TYLENOL) suppository 650 mg  650 mg Rectal Q6H PRN    pantoprazole (PROTONIX) 40 mg in sodium chloride (PF) 10 mL injection  40 mg IntraVENous Daily    piperacillin-tazobactam (ZOSYN) 3,375 mg in sodium chloride 0.9 % 50 mL IVPB (mini-bag)  3,375 mg IntraVENous q8h    glucose chewable tablet 16 g  4 tablet Oral PRN    dextrose bolus 10% 125 mL  125 mL IntraVENous PRN    Or    dextrose bolus 10% 250 mL  250 mL IntraVENous PRN    glucagon (rDNA) injection 1 mg  1 mg SubCUTAneous PRN    dextrose 10 % infusion   IntraVENous Continuous PRN    influenza quadrivalent split vaccine (FLUZONE;FLUARIX;FLULAVAL;AFLURIA) injection 0.5 mL  0.5 mL IntraMUSCular Prior to discharge       Signed:  Mary Alcazar MD    Part of this note may have been written by using a voice dictation software. The note has been proof read but may still contain some grammatical/other typographical errors.

## 2022-10-02 NOTE — PROGRESS NOTES
END OF SHIFT NOTE:    INTAKE/OUTPUT  10/01 0701 - 10/02 0700  In: 240 [P.O.:240]  Out: 500 [Urine:500]  Voiding: Yes  Catheter: No  Drain:              Flatus: Patient does have flatus present. Stool:  occurrences. Characteristics:                Emesis:  occurrences. Characteristics:        VITAL SIGNS  Patient Vitals for the past 12 hrs:   Temp Pulse Resp BP SpO2   10/02/22 1548 98.1 °F (36.7 °C) 71 16 (!) 102/59 91 %   10/02/22 1138 98.1 °F (36.7 °C) 72 18 104/64 94 %   10/02/22 0738 97.7 °F (36.5 °C) 80 16 109/69 97 %       Pain Assessment  Pain Level: 3 (10/02/22 8383)  Pain Location: Abdomen  Patient's Stated Pain Goal: 0 - No pain    Ambulating  Yes    Shift report given to oncoming nurse at the bedside.     Preeti Horne RN

## 2022-10-03 LAB
ALBUMIN SERPL-MCNC: 2.8 G/DL (ref 3.2–4.6)
ALBUMIN/GLOB SERPL: 0.7 {RATIO} (ref 1.2–3.5)
ALP SERPL-CCNC: 63 U/L (ref 50–136)
ALT SERPL-CCNC: 12 U/L (ref 12–65)
ANION GAP SERPL CALC-SCNC: 4 MMOL/L (ref 4–13)
AST SERPL-CCNC: 10 U/L (ref 15–37)
BASOPHILS # BLD: 0 K/UL (ref 0–0.2)
BASOPHILS NFR BLD: 0 % (ref 0–2)
BILIRUB SERPL-MCNC: 0.6 MG/DL (ref 0.2–1.1)
BILIRUB UR QL: NEGATIVE
BUN SERPL-MCNC: 12 MG/DL (ref 8–23)
CALCIUM SERPL-MCNC: 8.8 MG/DL (ref 8.3–10.4)
CHLORIDE SERPL-SCNC: 109 MMOL/L (ref 101–110)
CO2 SERPL-SCNC: 26 MMOL/L (ref 21–32)
CREAT SERPL-MCNC: 1.2 MG/DL (ref 0.8–1.5)
DIFFERENTIAL METHOD BLD: ABNORMAL
EOSINOPHIL # BLD: 0.1 K/UL (ref 0–0.8)
EOSINOPHIL NFR BLD: 2 % (ref 0.5–7.8)
ERYTHROCYTE [DISTWIDTH] IN BLOOD BY AUTOMATED COUNT: 13 % (ref 11.9–14.6)
GLOBULIN SER CALC-MCNC: 3.8 G/DL (ref 2.3–3.5)
GLUCOSE SERPL-MCNC: 97 MG/DL (ref 65–100)
GLUCOSE UR QL STRIP.AUTO: 500 MG/DL
HAPTOGLOB SERPL-MCNC: 203 MG/DL (ref 30–200)
HCT VFR BLD AUTO: 36.8 % (ref 41.1–50.3)
HGB BLD-MCNC: 12.3 G/DL (ref 13.6–17.2)
IMM GRANULOCYTES # BLD AUTO: 0 K/UL (ref 0–0.5)
IMM GRANULOCYTES NFR BLD AUTO: 1 % (ref 0–5)
KETONES UR-MCNC: ABNORMAL MG/DL
LEUKOCYTE ESTERASE UR QL STRIP: NEGATIVE
LYMPHOCYTES # BLD: 0.8 K/UL (ref 0.5–4.6)
LYMPHOCYTES NFR BLD: 16 % (ref 13–44)
MCH RBC QN AUTO: 32.8 PG (ref 26.1–32.9)
MCHC RBC AUTO-ENTMCNC: 33.4 G/DL (ref 31.4–35)
MCV RBC AUTO: 98.1 FL (ref 79.6–97.8)
MONOCYTES # BLD: 0.5 K/UL (ref 0.1–1.3)
MONOCYTES NFR BLD: 9 % (ref 4–12)
NEUTS SEG # BLD: 3.7 K/UL (ref 1.7–8.2)
NEUTS SEG NFR BLD: 72 % (ref 43–78)
NITRITE UR QL: NEGATIVE
NRBC # BLD: 0 K/UL (ref 0–0.2)
PATH REV BLD -IMP: NORMAL
PH UR: 6 [PH] (ref 5–9)
PLATELET # BLD AUTO: 111 K/UL (ref 150–450)
PMV BLD AUTO: 11.5 FL (ref 9.4–12.3)
POTASSIUM SERPL-SCNC: 3.8 MMOL/L (ref 3.5–5.1)
PROT SERPL-MCNC: 6.6 G/DL (ref 6.3–8.2)
PROT UR QL: NEGATIVE MG/DL
RBC # BLD AUTO: 3.75 M/UL (ref 4.23–5.6)
RBC # UR STRIP: NEGATIVE /UL
SODIUM SERPL-SCNC: 139 MMOL/L (ref 136–145)
SP GR UR: 1.01 (ref 1–1.02)
UROBILINOGEN UR QL: 0.2 EU/DL (ref 0.2–1)
WBC # BLD AUTO: 5.2 K/UL (ref 4.3–11.1)

## 2022-10-03 PROCEDURE — 6360000002 HC RX W HCPCS: Performed by: INTERNAL MEDICINE

## 2022-10-03 PROCEDURE — 1100000000 HC RM PRIVATE

## 2022-10-03 PROCEDURE — 36415 COLL VENOUS BLD VENIPUNCTURE: CPT

## 2022-10-03 PROCEDURE — C9113 INJ PANTOPRAZOLE SODIUM, VIA: HCPCS | Performed by: INTERNAL MEDICINE

## 2022-10-03 PROCEDURE — 2580000003 HC RX 258: Performed by: INTERNAL MEDICINE

## 2022-10-03 PROCEDURE — 97535 SELF CARE MNGMENT TRAINING: CPT

## 2022-10-03 PROCEDURE — 85025 COMPLETE CBC W/AUTO DIFF WBC: CPT

## 2022-10-03 PROCEDURE — 97161 PT EVAL LOW COMPLEX 20 MIN: CPT

## 2022-10-03 PROCEDURE — A4216 STERILE WATER/SALINE, 10 ML: HCPCS | Performed by: INTERNAL MEDICINE

## 2022-10-03 PROCEDURE — 97530 THERAPEUTIC ACTIVITIES: CPT

## 2022-10-03 PROCEDURE — 97165 OT EVAL LOW COMPLEX 30 MIN: CPT

## 2022-10-03 PROCEDURE — 80053 COMPREHEN METABOLIC PANEL: CPT

## 2022-10-03 PROCEDURE — 6370000000 HC RX 637 (ALT 250 FOR IP): Performed by: INTERNAL MEDICINE

## 2022-10-03 RX ADMIN — SODIUM CHLORIDE, PRESERVATIVE FREE 10 ML: 5 INJECTION INTRAVENOUS at 08:50

## 2022-10-03 RX ADMIN — ENOXAPARIN SODIUM 40 MG: 100 INJECTION SUBCUTANEOUS at 08:48

## 2022-10-03 RX ADMIN — PIPERACILLIN AND TAZOBACTAM 3375 MG: 3; .375 INJECTION, POWDER, FOR SOLUTION INTRAVENOUS at 11:51

## 2022-10-03 RX ADMIN — INSULIN GLARGINE 10 UNITS: 100 INJECTION, SOLUTION SUBCUTANEOUS at 08:49

## 2022-10-03 RX ADMIN — SODIUM CHLORIDE 40 MG: 9 INJECTION INTRAMUSCULAR; INTRAVENOUS; SUBCUTANEOUS at 08:50

## 2022-10-03 RX ADMIN — ROSUVASTATIN CALCIUM 20 MG: 20 TABLET, FILM COATED ORAL at 08:48

## 2022-10-03 RX ADMIN — PIPERACILLIN AND TAZOBACTAM 3375 MG: 3; .375 INJECTION, POWDER, FOR SOLUTION INTRAVENOUS at 03:58

## 2022-10-03 RX ADMIN — CYANOCOBALAMIN TAB 1000 MCG 1000 MCG: 1000 TAB at 08:48

## 2022-10-03 RX ADMIN — FINASTERIDE 5 MG: 5 TABLET, FILM COATED ORAL at 08:49

## 2022-10-03 RX ADMIN — LOSARTAN POTASSIUM 25 MG: 25 TABLET, FILM COATED ORAL at 08:48

## 2022-10-03 RX ADMIN — METOPROLOL SUCCINATE 25 MG: 25 TABLET, FILM COATED, EXTENDED RELEASE ORAL at 08:49

## 2022-10-03 RX ADMIN — TAMSULOSIN HYDROCHLORIDE 0.4 MG: 0.4 CAPSULE ORAL at 08:48

## 2022-10-03 RX ADMIN — PIPERACILLIN AND TAZOBACTAM 3375 MG: 3; .375 INJECTION, POWDER, FOR SOLUTION INTRAVENOUS at 19:44

## 2022-10-03 ASSESSMENT — PAIN DESCRIPTION - LOCATION: LOCATION: ABDOMEN

## 2022-10-03 ASSESSMENT — PAIN SCALES - GENERAL: PAINLEVEL_OUTOF10: 2

## 2022-10-03 ASSESSMENT — PAIN DESCRIPTION - DESCRIPTORS: DESCRIPTORS: ACHING

## 2022-10-03 NOTE — PROGRESS NOTES
END OF SHIFT NOTE:    INTAKE/OUTPUT  10/02 0701 - 10/03 0700  In: 079 1080 8178 [P.O.:240; I.V.:290]  Out: 1350 [Urine:1350]  Voiding: Yes  Catheter: No  Drain:              Flatus: Patient does have flatus present. Stool:  occurrences. Characteristics:                Emesis:  occurrences. Characteristics:        VITAL SIGNS  Patient Vitals for the past 12 hrs:   Temp Pulse Resp BP SpO2   10/03/22 1515 97.3 °F (36.3 °C) 68 18 97/61 95 %   10/03/22 1133 97.4 °F (36.3 °C) 71 18 129/72 97 %   10/03/22 0708 98.4 °F (36.9 °C) 72 17 127/81 95 %       Pain Assessment  Pain Level: 2 (10/03/22 0930)  Pain Location: Abdomen  Patient's Stated Pain Goal: 0 - No pain    Ambulating  Yes    Shift report given to oncoming nurse at the bedside.     Dorrie Canavan, RN

## 2022-10-03 NOTE — PROGRESS NOTES
ACUTE OCCUPATIONAL THERAPY GOALS:   (Developed with and agreed upon by patient and/or caregiver.)  1. Patient will complete lower body bathing and dressing with Mod I and adaptive equipment as   needed. 2. Patient will completed upper body bathing and dressing with Mod I and adaptive equipment as needed. 3. Patient will complete grooming standing at sink with Mod I and adaptive equipment as needed. 4. Patient will complete toileting with Mod I and adaptive equipment as needed. 5. Patient will complete functional transfers with Mod I and adaptive equipment as needed. Goals MET      OCCUPATIONAL THERAPY Initial Assessment, Daily Note, Discharge, and AM       OT Visit Days: 1  Acknowledge Orders  Time  OT Charge Capture  Rehab Caseload Tracker      Shefali Nolasco is a 78 y.o. male   PRIMARY DIAGNOSIS: Acute diverticulitis of intestine  PVD [facility]peripheral vascular disease) [facility] [I73.9]  Acute diverticulitis [K57.92]  Acute diverticulitis of intestine [K57.92]       Reason for Referral: Generalized Muscle Weakness (M62.81)  Other abnormalities of gait and mobility (R26.89)  Inpatient: Payor: [address] [place] [address] / Plan: [facility] MEDICARE HMO/PPO / Product Type: *No Product type* /     ASSESSMENT:     REHAB RECOMMENDATIONS:   Recommendation to date pending progress:  Setting:  No further skilled therapy after discharge from hospital    Equipment:    None     ASSESSMENT:  Mr. Johnathan Cason is a 77 y/o M presenting with acute diverticulitis. Pt seated in recliner on entry on RA, A/O x 4. Applicable PMHx: DMII, HTN, CAD, CKD III, macular degeneration. Pt denied light headedness, dizziness or SOB. Pt reports no fall(s) in past 6 months. PLOF (I) . DME present in home; None. Pt currently Mod I with UB ADLs, Mod I with LB ADLs, Mod I for toileting and Mod I for functional t/fs and household mobility for ADLs with no AD. Pt physically active prior to admission and reports recently beginning yoga.  Rest breaks utilized as needed throughout session. Pt presenting near/at baseline at present time. Pt denied any questions concerns at present time. No further skilled OT needs indicated currently, d/c from caseload. Will re-assess as appropriate with new orders.       [employer] [facility] 6 Clicks Daily Activity Inpatient Short Form:    AM-PAC Daily Activity Inpatient   How much help for putting on and taking off regular lower body clothing?: None  How much help for Bathing?: None  How much help for Toileting?: None  How much help for putting on and taking off regular upper body clothing?: None  How much help for taking care of personal grooming?: None  How much help for eating meals?: None  AM-[facility] Inpatient Daily Activity Raw Score: 24  AM-PAC Inpatient ADL T-Scale Score : 57.54  ADL Inpatient CMS 0-100% Score: 0  ADL Inpatient CMS G-Code Modifier : CH           SUBJECTIVE:     Mr. Kerry Knowles states, \"I go to the [facility] across the street and do yoga\"     Social/Functional Lives With: Alone  Type of Home: House  Home Layout: One level  Receives Help From: Neighbor  ADL Assistance: Independent  Active : No  Mode of Transportation: Cab  Occupation: Retired    OBJECTIVE:     Kell Bro / Morelia Erickson / Carolin Paula: IV    RESTRICTIONS/PRECAUTIONS:       PAIN: David Remak / O2:   Pre Treatment:   Pain Assessment: None - Denies Pain      Post Treatment: None       Vitals          Oxygen            GROSS EVALUATION: INTACT IMPAIRED   (See Comments)   UE AROM [x] []   UE PROM [] []NT   Strength [x]       Posture / Balance [x]     Sensation [x]     Coordination [x]       Tone []  N/A     Edema [] N/A   Activity Tolerance [x]       Hand Dominance R [x] L []      COGNITION/  PERCEPTION: INTACT IMPAIRED   (See Comments)   Orientation [x]     Vision [x]     Hearing [x]     Cognition  [x]     Perception [x]       MOBILITY: I Mod I S SBA CGA Min Mod Max Total  NT x2 Comments:   Bed Mobility    Rolling [] [] [] [] [] [] [] [] [] [x] []    Supine to Sit [] [] [] [] [] [] [] [] [] [x] []    Scooting [] [] [] [] [] [] [] [] [] [x] []    Sit to Supine [] [] [] [] [] [] [] [] [] [x] []    Transfers    Sit to Stand [] [x] [] [] [] [] [] [] [] [] []    Bed to Chair [] [x] [] [] [] [] [] [] [] [] []    Stand to Sit [] [x] [] [] [] [] [] [] [] [] []    Tub/Shower [] [] [] [] [] [] [] [] [] [x] []     Toilet [] [] [] [] [] [] [] [] [] [x] []      [] [] [] [] [] [] [] [] [] [] []    I=Independent, Mod I=Modified Independent, S=Supervision/Setup, SBA=Standby Assistance, CGA=Contact Guard Assistance, Min=Minimal Assistance, Mod=Moderate Assistance, Max=Maximal Assistance, Total=Total Assistance, NT=Not Tested    ACTIVITIES OF DAILY LIVING: I Mod I S SBA CGA Min Mod Max Total NT Comments   BASIC ADLs:              Upper Body Bathing  [] [x] [] [] [] [] [] [] [] [] BUE washing standing at sink no AD   Lower Body Bathing [] [x] [] [] [] [] [] [] [] []    Toileting [] [x] [] [] [] [] [] [] [] []    Upper Body Dressing [] [x] [] [] [] [] [] [] [] []    Lower Body Dressing [] [x] [] [] [] [] [] [] [] [] Doff/don socks seated in recliner   Feeding [] [x] [] [] [] [] [] [] [] []    Grooming [] [x] [] [] [] [] [] [] [] [] Oral hygiene and face washing standing at sink no AD   Personal Device Care [] [] [] [] [] [] [] [] [] [x]    Functional Mobility [] [x] [] [] [] [] [] [] [] [] Household mobility in room for ADLs no AD   I=Independent, Mod I=Modified Independent, S=Supervision/Setup, SBA=Standby Assistance, CGA=Contact Guard Assistance, Min=Minimal Assistance, Mod=Moderate Assistance, Max=Maximal Assistance, Total=Total Assistance, NT=Not Tested    PLAN:   FREQUENCY/DURATION   OT Plan of Care:  (D/c, no further needs indicated at present time) for duration of hospital stay or until stated goals are met, whichever comes first.    PROBLEM LIST:   (Skilled intervention is medically necessary to address:)  Decreased ADL/Functional Activities  Decreased Activity Tolerance   INTERVENTIONS PLANNED:  (Benefits and precautions of occupational therapy have been discussed with the patient.)  Self Care Training  Therapeutic Activity  Therapeutic Exercise/HEP  Neuromuscular Re-education  Manual Therapy  Education         TREATMENT:     EVALUATION: LOW COMPLEXITY: (Untimed Charge)    TREATMENT:   Self Care (15 minutes): Patient participated in upper body bathing, lower body dressing, and grooming ADLs in supported sitting and standing with no verbal cueing to increase independence, decrease assistance required, and increase activity tolerance. Patient also participated in functional mobility, functional transfer, and energy conservation training to increase independence, decrease assistance required, and increase activity tolerance.      TREATMENT GRID:  N/A    AFTER TREATMENT PRECAUTIONS: Bed/Chair Locked, Call light within reach, Chair, Needs within reach, and RN notified    INTERDISCIPLINARY COLLABORATION:  RN/ PCT and OT/ COOPER    EDUCATION:  Education Given To: Patient  Education Provided: Role of Therapy;Home Exercise Program;Plan of Care;Energy Conservation  Education Method: Verbal  Barriers to Learning: None  Education Outcome: Verbalized understanding    TOTAL TREATMENT DURATION AND TIME:  Time In: 1055  Time Out: [address]  Minutes: [address] [facility], OT

## 2022-10-03 NOTE — PROGRESS NOTES
ACUTE PHYSICAL THERAPY GOALS:   (Developed with and agreed upon by patient and/or caregiver. )  LTG:  (1.)Mr. Melia Santoyo will move from supine to sit and sit to supine , scoot up and down, and roll side to side in bed with INDEPENDENT within 1 treatment day(s). GOAL MET 10/3/2022  (2.)Mr. Davis will transfer from bed to chair and chair to bed with INDEPENDENT using the least restrictive device within 1 treatment day(s). GOAL MET 10/3/2022   (3.)Mr. Davis will ambulate with SUPERVISION for 250 feet with the least restrictive device within 1 treatment day(s). GOAL MET 10/3/2022  ________________________________________________________________________________________________       PHYSICAL THERAPY Initial Assessment, Discharge, and AM  (Link to Caseload Tracking: PT Visit Days : 1  Acknowledge Orders  Time In/Out  PT Charge Capture  Rehab Caseload Tracker    Gertrudis Bedolla is a 78 y.o. male   PRIMARY DIAGNOSIS: Acute diverticulitis of intestine  PVD [facility]peripheral vascular disease) [facility] [I73.9]  Acute diverticulitis [K57.92]  Acute diverticulitis of intestine [K57.92]       Reason for Referral: Difficulty in walking, Not elsewhere classified (R26.2)  Inpatient: Payor: Kat Ibarra / Plan: [facility] [place] MEDICARE HMO/PPO / Product Type: *No Product type* /     ASSESSMENT:     REHAB RECOMMENDATIONS:   Recommendation to date pending progress:  Setting:  No further skilled therapy after discharge from hospital    Equipment:    None     ASSESSMENT:  Mr. Melia Santoyo presents to hospital on 10/1/22 with abdominal pain x 1 month; found to have acute diverticulitis. Pt reports pain improved this date. Pt is independent at baseline, lives alone, is legally blind, orders groceries and takes taxis for transportation. Pt with out falls at home. Pt today independent with bed mobility and transfers. Pt ambulated in hallway pushing IV pole, no LOB with activity.  Pt is able to see shapes/objects in hallway, just not clear per patient. Pt overall appears to be functioning at baseline, reports normally active, recently started taking yoga classes. Pt with no further skilled PT needs at this time, will discharge from therapy.       [address] [facility] [address] [address] AM-[facility] 6 Clicks Basic Mobility Inpatient Short Form  AM-PAC Mobility Inpatient   How much difficulty turning over in bed?: None  How much difficulty sitting down on / standing up from a chair with arms?: None  How much difficulty moving from lying on back to sitting on side of bed?: None  How much help from another person moving to and from a bed to a chair?: None  How much help from another person needed to walk in hospital room?: None  How much help from another person for climbing 3-5 steps with a railing?: None  AM-PAC Inpatient Mobility Raw Score : 24  AM-PAC Inpatient T-Scale Score : 61.14  Mobility Inpatient CMS 0-100% Score: 0  Mobility Inpatient CMS G-Code Modifier : CH    SUBJECTIVE:   Mr. Alberta Gomez states, \"I am doing fine, my pain is much better\"     Social/Functional Lives With: Alone  Type of Home: House  Home Layout: One level  Receives Help From: Neighbor  ADL Assistance: Independent  Active : No  Mode of Transportation: Cab  Occupation: Retired    OBJECTIVE:     PAIN: Kristen Muster / O2: [employer] / Marie Nato / [place]:   Pre Treatment:   Pain Assessment: 0-10  Pain Level: 2  Pain Location: Abdomen  Pain Descriptors: Aching      Post Treatment: 2/10 Vitals        Oxygen  O2 Therapy: Room air   IV    RESTRICTIONS/PRECAUTIONS:                    GROSS EVALUATION: Intact Impaired (Comments):   AROM [x]     PROM [x]    Strength [x]     Balance [x]     Posture [] N/A   Sensation [x]     Coordination [x]      Tone [x]     Edema [x]    Activity Tolerance []  General limitations due to hospital stay    []      COGNITION/  PERCEPTION: Intact Impaired (Comments):   Orientation [x]     Vision []  Legally blind, macular degeneration    Hearing [x]  Slightly [place]   Cognition  [x]       MOBILITY: I Mod I S SBA CGA Min Mod Max Total  NT x2 Comments:   Bed Mobility    Rolling [x] [] [] [] [] [] [] [] [] [] []    Supine to Sit [x] [] [] [] [] [] [] [] [] [] []    Scooting [x] [] [] [] [] [] [] [] [] [] []    Sit to Supine [] [] [] [] [] [] [] [] [] [x] [] In chair   Transfers    Sit to Stand [x] [] [] [x] [] [] [] [] [] [] []    Bed to Chair [] [] [] [x] [] [] [] [] [] [] []    Stand to Sit [x] [] [] [x] [] [] [] [] [] [] []     [] [] [] [] [] [] [] [] [] [] []    I=Independent, Mod I=Modified Independent, S=Supervision, SBA=Standby Assistance, CGA=Contact Guard Assistance,   Min=Minimal Assistance, Mod=Moderate Assistance, Max=Maximal Assistance, Total=Total Assistance, NT=Not Tested    GAIT: I Mod I S SBA CGA Min Mod Max Total  NT x2 Comments:   Level of Assistance [] [] [] [x] [] [] [] [] [] [] []    Distance 500  feet    DME Pushing IV pole    Gait Quality Decreased sintia     Weightbearing Status      Stairs      I=Independent, Mod I=Modified Independent, S=Supervision, SBA=Standby Assistance, CGA=Contact Guard Assistance,   Min=Minimal Assistance, Mod=Moderate Assistance, Max=Maximal Assistance, Total=Total Assistance, NT=Not Tested    PLAN:   FREQUENCY AND DURATION:  (eval and d/c) for duration of hospital stay or until stated goals are met, whichever comes first.    THERAPY PROGNOSIS: Good    PROBLEM LIST:   (Skilled intervention is medically necessary to address:)  Decreased Activity Tolerance INTERVENTIONS PLANNED:   (Benefits and precautions of physical therapy have been discussed with the patient.)  Therapeutic Activity       TREATMENT:   EVALUATION: LOW COMPLEXITY: (Untimed Charge)    TREATMENT:   Therapeutic Activity (10 Minutes): Therapeutic activity included Supine to Sit, Scooting, Transfer Training, Ambulation on level ground, Sitting balance , and Standing balance to improve functional Activity tolerance, Balance, Coordination, and Mobility.     TREATMENT GRID:  N/A    AFTER TREATMENT PRECAUTIONS: Bed/Chair Locked, Call light within reach, Chair, Needs within reach, and RN notified    INTERDISCIPLINARY COLLABORATION:  RN/ PCT and PT/ PTA    EDUCATION: Education Given To: Patient  Education Provided: Role of Therapy  Education Method: Verbal  Barriers to Learning: None  Education Outcome: Verbalized understanding    TIME IN/OUT:  Time In: 0930  Time Out: [address]  Minutes: [address] [facility], PT

## 2022-10-03 NOTE — PROGRESS NOTES
END OF SHIFT NOTE:    INTAKE/OUTPUT  10/02 0701 - 10/03 0700  In: 65 [P.O.:240; I.V.:290]  Out: 1350 [Urine:1350]  Voiding: yes  Catheter: no  Drain:              Flatus: Patient does have flatus present. Stool:  0 occurrences. Characteristics:       Emesis: 0 occurrences. Characteristics:        VITAL SIGNS  Patient Vitals for the past 12 hrs:   Temp Pulse Resp BP SpO2   10/03/22 0310 98.1 °F (36.7 °C) 72 16 127/70 94 %   10/02/22 2337 98.1 °F (36.7 °C) 64 16 109/68 98 %   10/02/22 1906 97.7 °F (36.5 °C) 70 17 103/60 92 %       Pain Assessment                Ambulating  Yes in room. No c/o's pain. States abdomen feels better. Slept long periods. Shift report given to oncoming nurse at the bedside.     Tyrel Newell RN

## 2022-10-03 NOTE — CARE COORDINATION
Chart reviewed by [facility] and discussed in IDR. Patient disposition pending therapy evals. CM following for needs.

## 2022-10-03 NOTE — PROGRESS NOTES
[facility] Progress Note         Admit Date:  10/1/2022    Today's Date:  10/3/2022    CC:  Recurrent diverticulitis/? mass    Subjective:     Patient feels much improved. Abdominal pain is significantly improved. He has not required pain meds today per pt. He is tolerating soft diet. He denies N/V. He reports BM within 24hr that was normal.  He is hopeful to go home soon.     Medications:   Current Facility-Administered Medications   Medication Dose Route Frequency    vitamin B-12 (CYANOCOBALAMIN) tablet 1,000 mcg  1,000 mcg Oral Daily    finasteride (PROSCAR) tablet 5 mg  5 mg Oral Daily    insulin glargine (LANTUS) injection vial 10 Units  10 Units SubCUTAneous Daily    losartan (COZAAR) tablet 25 mg  25 mg Oral Daily    metoprolol succinate (TOPROL XL) extended release tablet 25 mg  25 mg Oral Daily    rosuvastatin (CRESTOR) tablet 20 mg  20 mg Oral Daily    tamsulosin (FLOMAX) capsule 0.4 mg  0.4 mg Oral Daily    sodium chloride flush 0.9 % injection 5-40 mL  5-40 mL IntraVENous 2 times per day    sodium chloride flush 0.9 % injection 5-40 mL  5-40 mL IntraVENous PRN    0.9 % sodium chloride infusion   IntraVENous PRN    enoxaparin (LOVENOX) injection 40 mg  40 mg SubCUTAneous Daily    ondansetron (ZOFRAN-ODT) disintegrating tablet 4 mg  4 mg Oral Q8H PRN    Or    ondansetron (ZOFRAN) injection 4 mg  4 mg IntraVENous Q6H PRN    polyethylene glycol (GLYCOLAX) packet 17 g  17 g Oral Daily PRN    acetaminophen (TYLENOL) tablet 650 mg  650 mg Oral Q6H PRN    Or    acetaminophen (TYLENOL) suppository 650 mg  650 mg Rectal Q6H PRN    pantoprazole (PROTONIX) 40 mg in sodium chloride (PF) 10 mL injection  40 mg IntraVENous Daily    piperacillin-tazobactam (ZOSYN) 3,375 mg in sodium chloride 0.9 % 50 mL IVPB (mini-bag)  3,375 mg IntraVENous q8h    glucose chewable tablet 16 g  4 tablet Oral PRN    dextrose bolus 10% 125 mL  125 mL IntraVENous PRN    Or    dextrose bolus 10% 250 mL  250 mL IntraVENous PRN    glucagon (rDNA) injection 1 mg  1 mg SubCUTAneous PRN    dextrose 10 % infusion   IntraVENous Continuous PRN    influenza quadrivalent split vaccine (FLUZONE;FLUARIX;FLULAVAL;AFLURIA) injection 0.5 mL  0.5 mL IntraMUSCular Prior to discharge       Review of Systems:  ROS was obtained, with pertinent positives as listed above. No chest pain or SOB. Diet:  GI soft diet    Objective:   Vitals:  /72   Pulse 71   Temp 97.4 °F (36.3 °C) (Oral)   Resp 18   Ht 5' 9\" (1.753 m)   Wt 160 lb (72.6 kg)   SpO2 97%   BMI 23.63 kg/m²   Intake/Output:  10/03 0701 - 10/03 1900  In: -   Out: 180 [Urine:180]  10/01 1901 - 10/03 0700  In: 622 [P.O.:480; I.V.:290]  Out: 1850 [Urine:1850]  Exam:  General appearance: alert, cooperative, no distress. Sitting up in chair. Lungs: clear to auscultation bilaterally anteriorly  Heart: regular rate and rhythm  Abdomen: soft, non-tender. Bowel sounds normal. No masses, no organomegaly  Extremities: extremities normal, atraumatic, no cyanosis or edema  Neuro:  alert and oriented    Data Review (Labs):    Recent Labs     10/01/22  1405 10/02/22  0523 10/03/22  0325   WBC 8.0 7.7 5.2   HGB 13.5* 12.2* 12.3*   HCT 40.3* 36.3* 36.8*   * 97* 111*   MCV 97.6 97.8 98.1*   * 136* 139   K 4.0 4.0 3.8    104 109   CO2 28 28 26   BUN 14 13 12   AST 10* 6* 10*   ALT 16 14 12   INR  --  1.2  --    APTT  --  37.4*  --      TTE 10/2/22    Left Ventricle: Low normal left ventricular systolic function with a visually estimated EF of 50 - 55%. Left ventricle size is normal. Normal wall thickness. There are regional wall motion abnormalities. Apical hypokinesis Abnormal diastolic function. Mitral Valve: Mild regurgitation. CT a/P 9/9/2022:       FINDINGS:   - LUNG BASES: No infiltrates or masses. - LIVER: Normal in size and appearance.      - GALLBLADDER/BILE DUCTS: No gallstones or bile duct dilatation.   - PANCREAS: Normal.   - SPLEEN: Normal.       - ADRENALS: Normal.   - KIDNEYS/URETERS: No hydronephrosis or significant mass. - BLADDER: Distended. - REPRODUCTIVE ORGANS: Prominent prostate.       - BOWEL: Extensive sigmoid diverticulosis. Minimal pericolic edema noted   adjacent to the proximal sigmoid colon suggesting mild acute diverticulitis. No   abscess.   - LYMPH NODES: No significant retroperitoneal, mesenteric, or pelvic adenopathy.   - BONES: Old left pelvic rami fractures. No acute fracture or bone lesion.   - VASCULATURE: Distal aortic stent graft in place. Surrounding 5.3 cm aneurysm.   - OTHER: No ascites. Impression   Mild acute sigmoid diverticulitis. If there are any questions about this report, I can be reached on [employer]   or at [phone]         CT A/P 10/1/2022:   FINDINGS:       There is scarring in the lingula. There is bibasilar atelectasis. CT abdomen: The liver and spleen enhances homogeneously without discrete   lesions. There is no biliary ductal dilatation. The gallbladder, pancreas and   adrenal glands are normal. The kidneys enhance symmetrically. Bowel loops in the   upper abdomen are normal. No definite upper abdominal lymphadenopathy seen. CT pelvis: There is extensive diverticulosis with pericolonic fat stranding at   the splenic flexure and proximal descending colon. The prostate is enlarged. The   bladder is distended and. The rectum is normal. No pelvic adenopathy seen. No   free air or free fluid seen within the pelvis. Bone window evaluation demonstrates no aggressive osseous lesions. Impression       1. Findings compatible with diverticulitis at the splenic flexure and proximal   descending colon. 2. Enlarged prostate with distended bladder. Cannot exclude bladder outlet   obstruction.            Colonoscopy 2/17/2017 with Dr. Ashley Brown at [facility]: 4mm polyp at 63cm, unable to retrieve the polyp, pancolonic diverticulosis without inflammation, at 20cm there were 2 colon polyps 6mm and 4mm. Assessment:     Principal Problem:    Acute diverticulitis of intestine  Active Problems:    Chronic renal disease, stage III ([facility] [[id number]]    Type 2 diabetes mellitus with chronic kidney disease    Benign essential hypertension    Coronary artery disease involving native coronary artery of native heart without angina pectoris  Resolved Problems:    * No resolved hospital problems. *    78 y.o. male with PMH including but not limited to hx of colon resection due to diverticulitis, BPH, DM2, HTN, CAD, CKD stage 3, Macular Degeneration, and R eye Histoplasmosis, seen in GI consultation 10/2 at the request of Dr. Chet Allen for recurrent diverticulitis/ ? mass. CT 9/9/22 showed findings c/w diverticulitis. S/p Tx Cipro and Flagyl x1wk with improvement but later recurred so PCP tx repeat course of Cipro and Flagyl 9/26 though despite this sx persisted. In ER 10/1, CT revealed diverticulitis to the splenic flexure. His last Colonoscopy with surgeon at [facility] in 2017 as above. 10/2: reported improving abdominal pain. Last BM was several days ago. 10/3:much improved. Tolerating soft diet. Plan:     -Continue Zosyn as inpatient. -GI soft diet. -Stool studies were reportedly ordered 10/2 for intermittent diarrhea. Stool Cx NGTD. FOBT Neg.  -Will need Colonoscopy in 6+weeks as outpatient to evaluate for extent of diverticulosis, r/o underlying colonic mass/IBD. Anticipate likely d/c home within 24hr.      Mariangel Clark PA-C  [facility]

## 2022-10-03 NOTE — PROGRESS NOTES
Hospitalist Progress Note   Admit Date:  10/1/2022  4:10 PM   Name:  Rajat Mathur   Age:  78 y.o. Sex:  male  :  1940   MRN:  [id number]   Room:  217/01    Presenting Complaint: Abdominal Pain     Reason(s) for Admission: PVD (peripheral vascular disease) ([facility] [address] [I73.9]  Acute diverticulitis [K57.92]  Acute diverticulitis of intestine Formerly [facility] Hospital Course: This is a 78-year-old male with past medical history significant for BPH, diabetes mellitus type 2, hypertension, coronary artery disease, CKD stage III, macular degeneration and right eye histoplasmosis was in his usual state of health until the beginning of September when he developed abdominal pain with diarrhea and chills. He was diagnosed with diverticulitis and started on antibiotics ciprofloxacin, Flagyl for 1 week. He felt better afterwards. He started having recurrent left-sided abdominal pain with chills again. In the ER, CT scan of the abdomen and pelvis was done and showed findings concerning for diverticulitis at the splenic flexure and proximal descending colon. He was admitted for further evaluation and management. Subjective & 24hr Events (10/03/22): Patient still complains of mild left lower quadrant abdominal pain. No fever no chills. No nausea no vomiting. Assessment & Plan: This is a 78-year-old male with    Acute recurrent diverticulitis  GI consulted. Appreciate recommendations. Continue Zosyn. Tolerating full liquid diet. Advance to GI soft diet. May need outpatient colonoscopy in 6 to 8 weeks. Abdominal pain with intermittent diarrhea  Stool studies pending. Lower extremity edema resolved  Echocardiogram shows mildly reduced left ventricular systolic function of 50 to 55%. Mild apical hypokinesis probably from previous coronary artery disease. Duplex venous ultrasound bilateral lower extremities negative.     BPH  Continue home medications Proscar    Coronary artery disease  Continue home medication    Hypertension  Continue home medication    CKD stage III  Creatinine at baseline    Diabetes mellitus type 2  Sliding scale insulin    Thrombocytopenia  Likely secondary to underlying infection. History of macular degeneration/blindness    History of right eye histoplasmosis    Debility      Anticipated discharge needs:    Anticipate discharge home tomorrow if patient continues to do well. Diet:  ADULT DIET; Dysphagia - Soft and Bite Sized  DVT PPx: Lovenox  Code status: Full Code    Hospital Problems:  Principal Problem:    Acute diverticulitis of intestine  Active Problems:    Chronic renal disease, stage III ([facility] [address] [950634]    Type 2 diabetes mellitus with chronic kidney disease    Benign essential hypertension    Coronary artery disease involving native coronary artery of native heart without angina pectoris  Resolved Problems:    * No resolved hospital problems. *      Objective:   Patient Vitals for the past 24 hrs:   Temp Pulse Resp BP SpO2   10/03/22 1133 97.4 °F (36.3 °C) 71 18 129/72 97 %   10/03/22 0708 98.4 °F (36.9 °C) 72 17 127/81 95 %   10/03/22 0310 98.1 °F (36.7 °C) 72 16 127/70 94 %   10/02/22 2337 98.1 °F (36.7 °C) 64 16 109/68 98 %   10/02/22 1906 97.7 °F (36.5 °C) 70 17 103/60 92 %   10/02/22 1548 98.1 °F (36.7 °C) 71 16 (!) 102/59 91 %       Oxygen Therapy  SpO2: 97 %  O2 Device: None (Room air)  O2 Flow Rate (L/min): 2 L/min    Estimated body mass index is 23.63 kg/m² as calculated from the following:    Height as of this encounter: 5' 9\" (1.753 m). Weight as of this encounter: 160 lb (72.6 kg). Intake/Output Summary (Last 24 hours) at 10/3/2022 1201  Last data filed at 10/3/2022 0808  Gross per 24 hour   Intake 530 ml   Output 1130 ml   Net -600 ml         Physical Exam:     Blood pressure 129/72, pulse 71, temperature 97.4 °F (36.3 °C), temperature source Oral, resp. rate 18, height 5' 9\" (1.753 m), weight 160 lb (72.6 kg), SpO2 97 %.   General: Well nourished. Alert awake male, ill-appearing,  Head:  Normocephalic, atraumatic  Eyes:  Sclerae appear normal.  Pupils equally round. ENT:  Nares appear normal, no drainage. Moist oral mucosa  Neck:  No restricted ROM. Trachea midline   CV:   RRR. No m/r/g. No jugular venous distension. Lungs:   CTAB. No wheezing, rhonchi, or rales. Symmetric expansion. Abdomen: Bowel sounds present. Soft, mild left lower quadrant tenderness, nondistended. No organomegaly,  Extremities: No cyanosis or clubbing. No edema  Skin:     No rashes and normal coloration. Warm and dry. Neuro:  CN II-XII grossly intact. Sensation intact. A&Ox3  Psych:  Normal mood and affect.       I have personally reviewed labs and tests showing:  Recent Labs:  Recent Results (from the past 48 hour(s))   CBC with Diff    Collection Time: 10/01/22  2:05 PM   Result Value Ref Range    WBC 8.0 4.3 - 11.1 K/uL    RBC 4.13 (L) 4.23 - 5.6 M/uL    Hemoglobin 13.5 (L) 13.6 - 17.2 g/dL    Hematocrit 40.3 (L) 41.1 - 50.3 %    MCV 97.6 79.6 - 97.8 FL    MCH 32.7 26.1 - 32.9 PG    MCHC 33.5 31.4 - 35.0 g/dL    RDW 13.2 11.9 - 14.6 %    Platelets 971 (L) 392 - 450 K/uL    MPV 11.4 9.4 - 12.3 FL    nRBC 0.00 0.0 - 0.2 K/uL    Differential Type AUTOMATED      Seg Neutrophils 82 (H) 43 - 78 %    Lymphocytes 9 (L) 13 - 44 %    Monocytes 7 4.0 - 12.0 %    Eosinophils % 1 0.5 - 7.8 %    Basophils 0 0.0 - 2.0 %    Immature Granulocytes 1 0.0 - 5.0 %    Segs Absolute 6.6 1.7 - 8.2 K/UL    Absolute Lymph # 0.7 0.5 - 4.6 K/UL    Absolute Mono # 0.6 0.1 - 1.3 K/UL    Absolute Eos # 0.1 0.0 - 0.8 K/UL    Basophils Absolute 0.0 0.0 - 0.2 K/UL    Absolute Immature Granulocyte 0.1 0.0 - 0.5 K/UL   CMP    Collection Time: 10/01/22  2:05 PM   Result Value Ref Range    Sodium 135 (L) 138 - 145 mmol/L    Potassium 4.0 3.5 - 5.1 mmol/L    Chloride 103 101 - 110 mmol/L    CO2 28 21 - 32 mmol/L    Anion Gap 4 4 - 13 mmol/L    Glucose 201 (H) 65 - 100 mg/dL    BUN Lactate Dehydrogenase    Collection Time: 10/01/22 10:52 PM   Result Value Ref Range     110 - 210 U/L   Reticulocytes    Collection Time: 10/01/22 10:52 PM   Result Value Ref Range    Reticulocyte Count,Automated 1.5 0.3 - 2.0 %    Absolute Retic # 0.0616 0.026 - 0.095 M/ul    Immature Retic Fraction 9.2 2.3 - 13.4 %    Retic Hemoglobin conc. 35 29 - 35 pg   DIRECT ANTIGLOBULIN TEST    Collection Time: 10/01/22 10:52 PM   Result Value Ref Range    Polyspecific Navneet NEG    Path Review, Smear    Collection Time: 10/01/22 10:52 PM   Result Value Ref Range    Pathologist Review       RBCs: Normochromic normocytic anemia. Morphologically unremarkable.    CEA    Collection Time: 10/01/22 10:52 PM   Result Value Ref Range    CEA 1.7 0.0 - 3.0 ng/mL   Culture, Blood 1    Collection Time: 10/01/22 11:03 PM    Specimen: Blood   Result Value Ref Range    Special Requests LEFT  HAND        Culture NO GROWTH 2 DAYS     Troponin    Collection Time: 10/02/22  1:12 AM   Result Value Ref Range    Troponin, High Sensitivity 15.8 (H) 0 - 14 pg/mL   Protein / creatinine ratio, urine    Collection Time: 10/02/22  4:27 AM   Result Value Ref Range    Protein, Urine, Random 13 (H) <11.9 mg/dL    Creatinine, Ur 47.00 mg/dL    PROTEIN/CREAT RATIO URINE RAN 0.3     Urinalysis with Reflex to Culture    Collection Time: 10/02/22  4:27 AM    Specimen: Urine   Result Value Ref Range    Color, UA YELLOW/STRAW      Appearance CLEAR      Specific Gravity, UA >1.035 (H) 1.001 - 1.023    pH, Urine 5.5 5.0 - 9.0      Protein, UA Negative NEG mg/dL    Glucose, UA >1000 mg/dL    Ketones, Urine 15 (A) NEG mg/dL    Bilirubin Urine Negative NEG      Blood, Urine Negative NEG      Urobilinogen, Urine 0.2 0.2 - 1.0 EU/dL    Nitrite, Urine Negative NEG      Leukocyte Esterase, Urine Negative NEG      Urine Culture if Indicated CULTURE NOT INDICATED BY UA RESULT      WBC, UA 0-4 (A) NORM /hpf    RBC, UA 0-5 (A) NORM /hpf    BACTERIA, URINE Negative (A) NORM /hpf    Epithelial Cells UA 0-5 (A) NORM /hpf    Casts 0-2 (A) NORM /lpf    Mucus, UA 0 0 /lpf   Basic Metabolic Panel w/ Reflex to MG    Collection Time: 10/02/22  5:23 AM   Result Value Ref Range    Sodium 136 (L) 138 - 145 mmol/L    Potassium 4.0 3.5 - 5.1 mmol/L    Chloride 104 101 - 110 mmol/L    CO2 28 21 - 32 mmol/L    Anion Gap 4 4 - 13 mmol/L    Glucose 148 (H) 65 - 100 mg/dL    BUN 13 8 - 23 MG/DL    Creatinine 1.20 0.8 - 1.5 MG/DL    GFR African American >60 >60 ml/min/1.73m2    GFR Non- >60 >60 ml/min/1.73m2    Calcium 8.6 8.3 - 10.4 MG/DL   Hepatic function panel    Collection Time: 10/02/22  5:23 AM   Result Value Ref Range    Total Protein 6.4 6.3 - 8.2 g/dL    Albumin 3.0 (L) 3.2 - 4.6 g/dL    Globulin 3.4 2.3 - 3.5 g/dL    Albumin/Globulin Ratio 0.9 (L) 1.2 - 3.5      Total Bilirubin 0.7 0.2 - 1.1 MG/DL    Bilirubin, Direct 0.2 <0.4 MG/DL    Alk Phosphatase 61 50 - 136 U/L    AST 6 (L) 15 - 37 U/L    ALT 14 12 - 65 U/L   Lactic Acid    Collection Time: 10/02/22  5:23 AM   Result Value Ref Range    Lactic Acid, Plasma 1.0 0.4 - 2.0 MMOL/L   CBC with Auto Differential    Collection Time: 10/02/22  5:23 AM   Result Value Ref Range    WBC 7.7 4.3 - 11.1 K/uL    RBC 3.71 (L) 4.23 - 5.6 M/uL    Hemoglobin 12.2 (L) 13.6 - 17.2 g/dL    Hematocrit 36.3 (L) 41.1 - 50.3 %    MCV 97.8 79.6 - 97.8 FL    MCH 32.9 26.1 - 32.9 PG    MCHC 33.6 31.4 - 35.0 g/dL    RDW 13.2 11.9 - 14.6 %    Platelets 97 (L) 448 - 450 K/uL    MPV 10.4 9.4 - 12.3 FL    nRBC 0.00 0.0 - 0.2 K/uL    Differential Type AUTOMATED      Seg Neutrophils 76 43 - 78 %    Lymphocytes 11 (L) 13 - 44 %    Monocytes 12 4.0 - 12.0 %    Eosinophils % 1 0.5 - 7.8 %    Basophils 0 0.0 - 2.0 %    Immature Granulocytes 1 0.0 - 5.0 %    Segs Absolute 5.8 1.7 - 8.2 K/UL    Absolute Lymph # 0.8 0.5 - 4.6 K/UL    Absolute Mono # 0.9 0.1 - 1.3 K/UL    Absolute Eos # 0.1 0.0 - 0.8 K/UL    Basophils Absolute 0.0 0.0 - 0.2 K/UL    Absolute Immature Granulocyte 0.1 0.0 - 0.5 K/UL   Protime-INR    Collection Time: 10/02/22  5:23 AM   Result Value Ref Range    Protime 15.3 (H) 12.6 - 14.5 sec    INR 1.2     APTT    Collection Time: 10/02/22  5:23 AM   Result Value Ref Range    PTT 37.4 (H) 24.1 - 35.1 SEC   Troponin    Collection Time: 10/02/22  5:23 AM   Result Value Ref Range    Troponin, High Sensitivity 14.8 (H) 0 - 14 pg/mL   POCT Glucose    Collection Time: 10/02/22  7:56 AM   Result Value Ref Range    POC Glucose 107 (H) 65 - 100 mg/dL    Performed by: [facility]    Troponin    Collection Time: 10/02/22  9:05 AM   Result Value Ref Range    Troponin, High Sensitivity 12.1 0 - 14 pg/mL   POCT Glucose    Collection Time: 10/02/22 11:38 AM   Result Value Ref Range    POC Glucose 143 (H) 65 - 100 mg/dL    Performed by: [facility]    Culture, Stool    Collection Time: 10/02/22  2:10 PM    Specimen: Stool   Result Value Ref Range    Special Requests NO SPECIAL REQUESTS      Culture        NO GROWTH OF SALMONELLA OR SHIGELLA IS EVIDENT ON FIRST READING, FINAL REPORT TO FOLLOW AFTER FURTHER INCUBATION OF CULTURE   Occult Blood, Fecal    Collection Time: 10/02/22  2:10 PM   Result Value Ref Range    POC Occult Blood, Fecal Negative NEG     Transthoracic echocardiogram (TTE) complete with contrast, bubble, strain, and 3D PRN    Collection Time: 10/02/22  4:16 PM   Result Value Ref Range    LA Minor Axis 5.1 cm    LA Major [place] 4.8 cm    LA Area 2C 14.8 cm2    LA Area 4C 13.7 cm2    LA Volume BP 33 18 - 58 mL    LA Diameter 3.5 cm    LV EDV A4C 103 mL    LV ESV A4C 41 mL    IVSd 1.3 (A) 0.6 - 1.0 cm    LVIDd 4.5 4.2 - 5.9 cm    LVIDs 3.2 cm    LVOT Diameter 2.2 cm    LVOT Mean Gradient 2 mmHg    LVOT VTI 20.4 cm    LVOT Peak Velocity 1.0 m/s    LVOT Peak Gradient 4 mmHg    LVPWd 1.1 (A) 0.6 - 1.0 cm    LV E' Lateral Velocity 5 cm/s    LV E' Septal Velocity 5 cm/s    LV Ejection Fraction A4C 60 %    LVOT Area 3.8 cm2    LVOT SV 77.5 ml    AV Mean nRBC 0.00 0.0 - 0.2 K/uL    Differential Type AUTOMATED      Seg Neutrophils 72 43 - 78 %    Lymphocytes 16 13 - 44 %    Monocytes 9 4.0 - 12.0 %    Eosinophils % 2 0.5 - 7.8 %    Basophils 0 0.0 - 2.0 %    Immature Granulocytes 1 0.0 - 5.0 %    Segs Absolute 3.7 1.7 - 8.2 K/UL    Absolute Lymph # 0.8 0.5 - 4.6 K/UL    Absolute Mono # 0.5 0.1 - 1.3 K/UL    Absolute Eos # 0.1 0.0 - 0.8 K/UL    Basophils Absolute 0.0 0.0 - 0.2 K/UL    Absolute Immature Granulocyte 0.0 0.0 - 0.5 K/UL   Comprehensive Metabolic Panel w/ Reflex to MG    Collection Time: 10/03/22  3:25 AM   Result Value Ref Range    Sodium 139 136 - 145 mmol/L    Potassium 3.8 3.5 - 5.1 mmol/L    Chloride 109 101 - 110 mmol/L    CO2 26 21 - 32 mmol/L    Anion Gap 4 4 - 13 mmol/L    Glucose 97 65 - 100 mg/dL    BUN 12 8 - 23 MG/DL    Creatinine 1.20 0.8 - 1.5 MG/DL    GFR African American >60 >60 ml/min/1.73m2    GFR Non- >60 >60 ml/min/1.73m2    Calcium 8.8 8.3 - 10.4 MG/DL    Total Bilirubin 0.6 0.2 - 1.1 MG/DL    ALT 12 12 - 65 U/L    AST 10 (L) 15 - 37 U/L    Alk Phosphatase 63 50 - 136 U/L    Total Protein 6.6 6.3 - 8.2 g/dL    Albumin 2.8 (L) 3.2 - 4.6 g/dL    Globulin 3.8 (H) 2.3 - 3.5 g/dL    Albumin/Globulin Ratio 0.7 (L) 1.2 - 3.5         I have personally reviewed imaging studies showing: Other Studies:  XR CHEST PORTABLE   Final Result   No evidence of an acute intrathoracic process. Vascular duplex lower extremity venous bilateral   Final Result   No evidence of deep venous thrombosis in the bilateral lower extremities. CT ABDOMEN PELVIS W IV CONTRAST Additional Contrast? Oral   Final Result      1. Findings compatible with diverticulitis at the splenic flexure and proximal   descending colon. 2. Enlarged prostate with distended bladder. Cannot exclude bladder outlet   obstruction.                    Current Meds:  Current Facility-Administered Medications   Medication Dose Route Frequency    vitamin B-12 (CYANOCOBALAMIN) tablet 1,000 mcg  1,000 mcg Oral Daily    finasteride (PROSCAR) tablet 5 mg  5 mg Oral Daily    insulin glargine (LANTUS) injection vial 10 Units  10 Units SubCUTAneous Daily    losartan (COZAAR) tablet 25 mg  25 mg Oral Daily    metoprolol succinate (TOPROL XL) extended release tablet 25 mg  25 mg Oral Daily    rosuvastatin (CRESTOR) tablet 20 mg  20 mg Oral Daily    tamsulosin (FLOMAX) capsule 0.4 mg  0.4 mg Oral Daily    sodium chloride flush 0.9 % injection 5-40 mL  5-40 mL IntraVENous 2 times per day    sodium chloride flush 0.9 % injection 5-40 mL  5-40 mL IntraVENous PRN    0.9 % sodium chloride infusion   IntraVENous PRN    enoxaparin (LOVENOX) injection 40 mg  40 mg SubCUTAneous Daily    ondansetron (ZOFRAN-ODT) disintegrating tablet 4 mg  4 mg Oral Q8H PRN    Or    ondansetron (ZOFRAN) injection 4 mg  4 mg IntraVENous Q6H PRN    polyethylene glycol (GLYCOLAX) packet 17 g  17 g Oral Daily PRN    acetaminophen (TYLENOL) tablet 650 mg  650 mg Oral Q6H PRN    Or    acetaminophen (TYLENOL) suppository 650 mg  650 mg Rectal Q6H PRN    pantoprazole (PROTONIX) 40 mg in sodium chloride (PF) 10 mL injection  40 mg IntraVENous Daily    piperacillin-tazobactam (ZOSYN) 3,375 mg in sodium chloride 0.9 % 50 mL IVPB (mini-bag)  3,375 mg IntraVENous q8h    glucose chewable tablet 16 g  4 tablet Oral PRN    dextrose bolus 10% 125 mL  125 mL IntraVENous PRN    Or    dextrose bolus 10% 250 mL  250 mL IntraVENous PRN    glucagon (rDNA) injection 1 mg  1 mg SubCUTAneous PRN    dextrose 10 % infusion   IntraVENous Continuous PRN    influenza quadrivalent split vaccine (FLUZONE;FLUARIX;FLULAVAL;AFLURIA) injection 0.5 mL  0.5 mL IntraMUSCular Prior to discharge       Signed:  William Perla MD    Part of this note may have been written by using a voice dictation software. The note has been proof read but may still contain some grammatical/other typographical errors.

## 2022-10-04 VITALS
HEIGHT: 69 IN | OXYGEN SATURATION: 91 % | SYSTOLIC BLOOD PRESSURE: 124 MMHG | BODY MASS INDEX: 24.07 KG/M2 | HEART RATE: 73 BPM | DIASTOLIC BLOOD PRESSURE: 70 MMHG | TEMPERATURE: 97.6 F | WEIGHT: 162.5 LBS | RESPIRATION RATE: 18 BRPM

## 2022-10-04 LAB
ANION GAP SERPL CALC-SCNC: 7 MMOL/L (ref 4–13)
BASOPHILS # BLD: 0 K/UL (ref 0–0.2)
BASOPHILS NFR BLD: 0 % (ref 0–2)
BUN SERPL-MCNC: 15 MG/DL (ref 8–23)
CALCIUM SERPL-MCNC: 8.9 MG/DL (ref 8.3–10.4)
CHLORIDE SERPL-SCNC: 107 MMOL/L (ref 101–110)
CO2 SERPL-SCNC: 26 MMOL/L (ref 21–32)
CREAT SERPL-MCNC: 1.4 MG/DL (ref 0.8–1.5)
DIFFERENTIAL METHOD BLD: ABNORMAL
EOSINOPHIL # BLD: 0.2 K/UL (ref 0–0.8)
EOSINOPHIL NFR BLD: 4 % (ref 0.5–7.8)
ERYTHROCYTE [DISTWIDTH] IN BLOOD BY AUTOMATED COUNT: 12.8 % (ref 11.9–14.6)
GLUCOSE BLD STRIP.AUTO-MCNC: 107 MG/DL (ref 65–100)
GLUCOSE BLD STRIP.AUTO-MCNC: 198 MG/DL (ref 65–100)
GLUCOSE SERPL-MCNC: 114 MG/DL (ref 65–100)
HCT VFR BLD AUTO: 38.8 % (ref 41.1–50.3)
HGB BLD-MCNC: 12.8 G/DL (ref 13.6–17.2)
IMM GRANULOCYTES # BLD AUTO: 0 K/UL (ref 0–0.5)
IMM GRANULOCYTES NFR BLD AUTO: 0 % (ref 0–5)
LYMPHOCYTES # BLD: 1 K/UL (ref 0.5–4.6)
LYMPHOCYTES NFR BLD: 26 % (ref 13–44)
MCH RBC QN AUTO: 32.1 PG (ref 26.1–32.9)
MCHC RBC AUTO-ENTMCNC: 33 G/DL (ref 31.4–35)
MCV RBC AUTO: 97.2 FL (ref 79.6–97.8)
MONOCYTES # BLD: 0.4 K/UL (ref 0.1–1.3)
MONOCYTES NFR BLD: 12 % (ref 4–12)
NEUTS SEG # BLD: 2.1 K/UL (ref 1.7–8.2)
NEUTS SEG NFR BLD: 58 % (ref 43–78)
NRBC # BLD: 0 K/UL (ref 0–0.2)
PLATELET # BLD AUTO: 121 K/UL (ref 150–450)
PMV BLD AUTO: 11 FL (ref 9.4–12.3)
POTASSIUM SERPL-SCNC: 3.8 MMOL/L (ref 3.5–5.1)
RBC # BLD AUTO: 3.99 M/UL (ref 4.23–5.6)
SERVICE CMNT-IMP: ABNORMAL
SERVICE CMNT-IMP: ABNORMAL
SODIUM SERPL-SCNC: 140 MMOL/L (ref 138–145)
WBC # BLD AUTO: 3.7 K/UL (ref 4.3–11.1)

## 2022-10-04 PROCEDURE — 6370000000 HC RX 637 (ALT 250 FOR IP): Performed by: INTERNAL MEDICINE

## 2022-10-04 PROCEDURE — 85025 COMPLETE CBC W/AUTO DIFF WBC: CPT

## 2022-10-04 PROCEDURE — A4216 STERILE WATER/SALINE, 10 ML: HCPCS | Performed by: INTERNAL MEDICINE

## 2022-10-04 PROCEDURE — 36415 COLL VENOUS BLD VENIPUNCTURE: CPT

## 2022-10-04 PROCEDURE — 6360000002 HC RX W HCPCS: Performed by: INTERNAL MEDICINE

## 2022-10-04 PROCEDURE — C9113 INJ PANTOPRAZOLE SODIUM, VIA: HCPCS | Performed by: INTERNAL MEDICINE

## 2022-10-04 PROCEDURE — 82962 GLUCOSE BLOOD TEST: CPT

## 2022-10-04 PROCEDURE — 90471 IMMUNIZATION ADMIN: CPT | Performed by: INTERNAL MEDICINE

## 2022-10-04 PROCEDURE — 90686 IIV4 VACC NO PRSV 0.5 ML IM: CPT | Performed by: INTERNAL MEDICINE

## 2022-10-04 PROCEDURE — 2580000003 HC RX 258: Performed by: INTERNAL MEDICINE

## 2022-10-04 PROCEDURE — 80048 BASIC METABOLIC PNL TOTAL CA: CPT

## 2022-10-04 RX ORDER — SACCHAROMYCES BOULARDII 250 MG
250 CAPSULE ORAL 2 TIMES DAILY
Qty: 14 CAPSULE | Refills: 0 | Status: SHIPPED | OUTPATIENT
Start: 2022-10-04 | End: 2022-10-11

## 2022-10-04 RX ORDER — AMOXICILLIN AND CLAVULANATE POTASSIUM 875; 125 MG/1; MG/1
1 TABLET, FILM COATED ORAL 2 TIMES DAILY
Qty: 20 TABLET | Refills: 0 | Status: SHIPPED | OUTPATIENT
Start: 2022-10-04 | End: 2022-10-14

## 2022-10-04 RX ORDER — POLYETHYLENE GLYCOL 3350 17 G/17G
17 POWDER, FOR SOLUTION ORAL DAILY PRN
Qty: 15 EACH | Refills: 0 | Status: SHIPPED | OUTPATIENT
Start: 2022-10-04 | End: 2022-10-19

## 2022-10-04 RX ADMIN — CYANOCOBALAMIN TAB 1000 MCG 1000 MCG: 1000 TAB at 09:01

## 2022-10-04 RX ADMIN — SODIUM CHLORIDE 40 MG: 9 INJECTION INTRAMUSCULAR; INTRAVENOUS; SUBCUTANEOUS at 09:01

## 2022-10-04 RX ADMIN — TAMSULOSIN HYDROCHLORIDE 0.4 MG: 0.4 CAPSULE ORAL at 09:01

## 2022-10-04 RX ADMIN — INFLUENZA VIRUS VACCINE 0.5 ML: 15; 15; 15; 15 SUSPENSION INTRAMUSCULAR at 11:30

## 2022-10-04 RX ADMIN — ENOXAPARIN SODIUM 40 MG: 100 INJECTION SUBCUTANEOUS at 09:02

## 2022-10-04 RX ADMIN — FINASTERIDE 5 MG: 5 TABLET, FILM COATED ORAL at 09:01

## 2022-10-04 RX ADMIN — LOSARTAN POTASSIUM 25 MG: 25 TABLET, FILM COATED ORAL at 09:01

## 2022-10-04 RX ADMIN — INSULIN GLARGINE 10 UNITS: 100 INJECTION, SOLUTION SUBCUTANEOUS at 09:05

## 2022-10-04 RX ADMIN — PIPERACILLIN AND TAZOBACTAM 3375 MG: 3; .375 INJECTION, POWDER, FOR SOLUTION INTRAVENOUS at 03:49

## 2022-10-04 RX ADMIN — ACETAMINOPHEN 650 MG: 325 TABLET, FILM COATED ORAL at 01:56

## 2022-10-04 RX ADMIN — SODIUM CHLORIDE, PRESERVATIVE FREE 10 ML: 5 INJECTION INTRAVENOUS at 09:02

## 2022-10-04 RX ADMIN — ROSUVASTATIN CALCIUM 20 MG: 20 TABLET, FILM COATED ORAL at 09:01

## 2022-10-04 RX ADMIN — METOPROLOL SUCCINATE 25 MG: 25 TABLET, FILM COATED, EXTENDED RELEASE ORAL at 09:01

## 2022-10-04 ASSESSMENT — PAIN DESCRIPTION - ORIENTATION: ORIENTATION: MID;POSTERIOR

## 2022-10-04 ASSESSMENT — PAIN SCALES - GENERAL: PAINLEVEL_OUTOF10: 3

## 2022-10-04 ASSESSMENT — PAIN DESCRIPTION - DESCRIPTORS: DESCRIPTORS: ACHING

## 2022-10-04 ASSESSMENT — PAIN DESCRIPTION - LOCATION: LOCATION: HEAD

## 2022-10-04 ASSESSMENT — PAIN - FUNCTIONAL ASSESSMENT: PAIN_FUNCTIONAL_ASSESSMENT: ACTIVITIES ARE NOT PREVENTED

## 2022-10-04 NOTE — CARE COORDINATION
RNCM met with patient in room 217 to discuss discharge orders today. Patient in agreement with discharge plan today. Patient denies supportive needs. Patient's family to provide transportation home. Patient has met all treatment goals and milestones. CM following until discharged.          ASSESSMENT NOTE    Attending Physician: Keyla Knight MD  Admit Problem: PVD (peripheral vascular disease) ([address] [I73.9]  Acute diverticulitis [K57.92]  Acute diverticulitis of intestine [K57.92]  Date/Time of Admission: 10/1/2022  4:10 PM  Problem List:  Patient Active Problem List   Diagnosis    Coronary artery disease involving native coronary artery of native heart without angina pectoris    Depressive disorder    Foraminal stenosis of lumbar region    Stage 3 chronic kidney disease (HCC)    Generalized ischemic myocardial dysfunction    Carotid stenosis, asymptomatic    History of coronary artery stent placement    Legally blind    AICD (automatic cardioverter/defibrillator) present    Diabetes mellitus ([address]    Ischemic congestive cardiomyopathy ([address]    Chronic bilateral low back pain with right-sided sciatica    Cobalamin deficiency    Hyperlipidemia    Benign prostatic hyperplasia without urinary obstruction    Bilateral carotid artery stenosis    Chronic renal disease, stage III ([address] [324365]    Type 2 diabetes mellitus with chronic kidney disease    Abdominal aortic aneurysm, without rupture    Acute kidney injury ([address]    Benign essential hypertension    PVD (peripheral vascular disease) ([address]    Dyslipidemia    Acute diverticulitis of intestine       Service Assessment  Patient Orientation Alert and Oriented   Cognition Alert   History Provided By Patient   Primary Caregiver Self   Accompanied By/Relationship     Support Systems Friends/Neighbors (all family are out ot state)   [facility] is: Legal Next of Jay[address] 67   PCP Verified by CM Yes   Last Visit to PCP Within last 3 months   Prior Functional Level Independent in ADLs/IADLs   Current Functional Level Independent in ADLs/IADLs   Can patient return to prior living arrangement Yes   Ability to make needs known: Good   Family able to assist with home care needs: No   Would you like for me to discuss the discharge plan with any other family members/significant others, and if so, who? No   Financial Resources [facility] ([address])   Community Resources     CM/SW Referral       Social/Functional History  Lives With Alone   Type of [address] One level   Home Access     Entrance Stairs - Number of Steps     Entrance Stairs - Rails     Bathroom Shower/Tub     Bathroom Toilet     Bathroom Equipment     Bathroom Accessibility     Home Equipment     Receives Help From [address]     Dressing     Grooming     Feeding     Toileting     [address] Work     Driving     Shopping          Other (Comment)     [address] [facility] Possibility Space Paying/Finance [address] [facility] Management     Other (Comment)     Ambulation Assistance     Transfer Assistance     Active  No   Patient's  Info     Mode of Transportation Cab   Education     Occupation Retired   Type of Occupation       Discharge Planning   Type of [address] Friends/Neighbors (all family are out ot state)   Current Services Prior To Admission None   Potential Assistance Needed N/A   DME     DME     DME Ordered? No   Potential Assistance Purchasing Medications No   Meds-to-Beds: Does the patient want to have any new prescriptions delivered to bedside prior to discharge?      Type of Home Care Services None   Patient expects to be discharged to: House   Follow Up Appointment: Best Day/Time     One/Two Story Residence: One story   # of Interior Steps     Height of Each Step (in)     [employer] Available     History of Falls? Yes     Services At/After Discharge  Transition of Care Consult (CM Consult): Discharge Planning   Internal Home Health     Internal Hospice     Reason Outside Agency [address]     Partner SNF     Reason Why Partner SNF Not Chosen     Internal Comfort Care     Reason Outside [address] Discharge  (May need home health pending progress)   [address] Provided? No   Mode of Transport at Discharge Other (see comment) (cab)   Hospital Transport Time of Discharge     Confirm Follow Up Transport Cab     Condition of Participation: Discharge Planning  The plan for Transition of Care is related to the following treatment goals: Pt will return home with supportive care referrals if indicated. The Patient and/or Patient Representative was provided with a Choice of Provider? Patient   Name of the Patient Representative who was provided with the Choice of Provider and agrees with the Discharge Plan? The Patient and/or Patient Representative Agree with the Discharge Plan? Yes   Freedom of Choice list was provided with basic dialogue that supports the individualized plan of care/goals, treatment preferences, and shares the quality data associated with the providers?  Yes     Documentation for Discharge Appeal  Discharge Appealed by     Date notified by QIO of appeal request:     Time notified by QIO of appeal request:     Detailed Notice of Discharge given to:     Date Notice of Discharge given:     Time Notice of Discharge given:     Date records sent to [address]     Time records sent to [address]     Date Notified of Outcome     Time Notified of Outcome     Outcome of appeal           Fermin Hayward RN 10/04/22 11:24 AM

## 2022-10-04 NOTE — DISCHARGE SUMMARY
Hospitalist Discharge Summary   Admit Date:  10/1/2022  4:10 PM   DC Note date: 10/4/2022  Name:  Jany Joseph   Age:  78 y.o. Sex:  male  :  1940   MRN:  [id number]   Room:    PCP:  Lenn Peabody, APRN - NP    Presenting Complaint: Abdominal Pain     Initial Admission Diagnosis: PVD (peripheral vascular disease) ([facility] [address] [I73.9]  Acute diverticulitis [K57.92]  Acute diverticulitis of intestine [K57.92]     Problem List for this Hospitalization (present on admission):    Principal Problem:    Acute diverticulitis of intestine  Active Problems:    Chronic renal disease, stage III ([facility] [address] [745087]    Type 2 diabetes mellitus with chronic kidney disease    Benign essential hypertension    Coronary artery disease involving native coronary artery of native heart without angina pectoris  Resolved Problems:    * No resolved hospital problems. [facility] Course: This is a 47-year-old male with past medical history significant for BPH, diabetes mellitus type 2, hypertension, coronary artery disease, CKD stage III, macular degeneration and right eye histoplasmosis was in his usual state of health until the beginning of September when he developed abdominal pain with diarrhea and chills. He was diagnosed with diverticulitis and started on antibiotics ciprofloxacin, Flagyl for 1 week. He felt better afterwards. He started having recurrent left-sided abdominal pain with chills again. In the ER, CT scan of the abdomen and pelvis was done and showed findings concerning for diverticulitis at the splenic flexure and proximal descending colon. He was admitted for further evaluation and management. During the course of hospitalization, patient was treated with IV Zosyn, and IV fluids. Nausea vomiting abdominal pain significantly improved. GI was consulted. Patient's diet was advanced as tolerated. GI recommends outpatient colonoscopy in 6 to 8 weeks.   He was tolerating regular diet with no nausea vomiting or abdominal pain. He was given prescription for Augmentin for 10 days. Duplex ultrasound bilateral lower extremities was done and negative for DVT. Echocardiogram was done which showed mildly reduced left regular systolic function of 50 to 55%. Mild apical hypokinesis probably from previous coronary artery disease. Other chronic medical problems which are stable included hypertension, coronary artery disease, CKD stage III, diabetes mellitus type 2, BPH. Patient was discharged home today in a stable condition to follow-up with primary care physician in 3 to 5 days, GI in 6 to 8 weeks. Disposition: Home today  Diet: ADULT DIET; Dysphagia - Soft and Bite Sized  Code Status: Full Code    Follow Ups:   Follow-up [address], APRN - NP. Schedule an appointment as soon as possible   for a visit. Specialty: Family Medicine  Why: SCHEDULE A APPOINTMENT THRU MY CHART  Contact information:  [id number] [facility] [id number]  [address] [place] [address], APRN - NP . Specialty: Family Medicine  Contact information:  [id number] [place] [address]  Ana Mclaughlin MD. Schedule an appointment as soon as possible for a visit   in 2 month(s). Specialty: Gastroenterology  Why: OFFICE WILL CALL WITH APPOINTMENT  Contact information:  [address] [facility]  [phone]                       Time spent in patient discharge and coordination 39 minutes. Follow up labs/diagnostics (ultimately defer to outpatient provider):  CBC, BMP in 3 to 5 days. Plan was discussed with the patient. All questions answered. Patient was stable at time of discharge. Instructions given to call a physician or return if any concerns.     Current Discharge Medication List        START taking these medications    Details   polyethylene glycol (GLYCOLAX) 17 g packet Take 17 g by mouth daily as needed for Constipation  Qty: 15 each, Refills: 0      amoxicillin-clavulanate (AUGMENTIN) 875-125 MG per tablet Take 1 tablet by mouth 2 times daily for 10 days  Qty: 20 tablet, Refills: 0      saccharomyces boulardii (FLORASTOR) 250 MG capsule Take 1 capsule by mouth 2 times daily for 7 days  Qty: 14 capsule, Refills: 0           CONTINUE these medications which have NOT CHANGED    Details   B-D ULTRAFINE III SHORT PEN 31G X 8 MM MISC Inject 1 each into the skin daily      losartan (COZAAR) 25 MG tablet Take 25 mg by mouth daily      tamsulosin (FLOMAX) 0.4 MG capsule Take 1 capsule by mouth daily  Qty: 30 capsule, Refills: 5      finasteride (PROSCAR) 5 MG tablet Take 1 tablet by mouth daily  Qty: 30 tablet, Refills: 5      metoprolol succinate (TOPROL XL) 25 MG extended release tablet Take 1 tablet by mouth in the morning.   Qty: 90 tablet, Refills: 1    Associated Diagnoses: Coronary artery disease involving native coronary artery of native heart without angina pectoris      ONETOUCH ULTRA strip Inject 1 each into the skin daily       B Complex Vitamins (B COMPLEX PO) Take 1 tablet by mouth daily      cyanocobalamin 1000 MCG tablet Take 1,000 mcg by mouth daily       Omega-3 Fatty Acids (FISH OIL OMEGA-3 PO) Take by mouth daily      Coenzyme Q10 (CO Q-10 PO) Take by mouth daily      empagliflozin (JARDIANCE) 25 MG tablet Take 1 tablet by mouth daily  Qty: 90 tablet, Refills: 1    Associated Diagnoses: Type 2 diabetes mellitus without complication, with long-term current use of insulin ([facility])      ezetimibe (ZETIA) 10 mg tablet Take 1 tablet by mouth daily  Qty: 90 tablet, Refills: 1    Associated Diagnoses: Hyperlipidemia associated with type 2 diabetes mellitus ([facility])      glimepiride (AMARYL) 2 MG tablet Take 1 tablet by mouth daily  Qty: 90 tablet, Refills: 1    Associated Diagnoses: Type 2 diabetes mellitus without complication, with long-term current use of insulin ([facility])      insulin glargine (LANTUS;BASAGLAR) 100 UNIT/ML injection pen Inject 10 Units into the skin daily  Qty: 5 pen, Refills: 5    Associated Diagnoses: Type 2 diabetes mellitus without complication, with long-term current use of insulin (HCC)      rosuvastatin (CRESTOR) 20 MG tablet Take 1 tablet by mouth daily  Qty: 90 tablet, Refills: 1    Associated Diagnoses: Hyperlipidemia associated with type 2 diabetes mellitus (HCC)      BABY ASPIRIN PO Take by mouth daily           STOP taking these medications       metroNIDAZOLE (FLAGYL) 500 MG tablet Comments:   Reason for Stopping:         cefdinir (OMNICEF) 300 MG capsule Comments:   Reason for Stopping:               Procedures done this admission:  * No surgery found *    Consults this admission:  IP CONSULT TO SOCIAL WORK  IP CONSULT TO GI  IP CONSULT TO PHYSICAL THERAPY  IP CONSULT TO OCCUPATIONAL THERAPY    Echocardiogram results:  10/01/22    TRANSTHORACIC ECHOCARDIOGRAM (TTE) COMPLETE (CONTRAST/BUBBLE/3D PRN) 10/02/2022  5:12 PM (Final)    Interpretation Summary    Left Ventricle: Low normal left ventricular systolic function with a visually estimated EF of 50 - 55%. Left ventricle size is normal. Normal wall thickness. There are regional wall motion abnormalities. Apical hypokinesis Abnormal diastolic function. Mitral Valve: Mild regurgitation. Signed by: Tab Clifton DO on 10/2/2022  5:12 PM      Diagnostic Imaging/Tests:   XR CHEST (2 VW)    Result Date: 9/8/2022  1. No acute findings in the chest. 2.  Multiple age-indeterminate left rib fractures. 3.  6 mm nodule projecting over the lung bases on the lateral view. Recommend evaluation with nonemergent chest CT. CT ABDOMEN PELVIS W IV CONTRAST Additional Contrast? Oral    Result Date: 10/1/2022  1. Findings compatible with diverticulitis at the splenic flexure and proximal descending colon. 2. Enlarged prostate with distended bladder. Cannot exclude bladder outlet obstruction.      CT ABDOMEN PELVIS W IV CONTRAST Additional Contrast? None    Result Date: 9/9/2022  Mild acute sigmoid diverticulitis. If there are any questions about this report, I can be reached on PerfectServe or at [phone]     XR CHEST PORTABLE    Result Date: 10/2/2022  No evidence of an acute intrathoracic process. Vascular duplex lower extremity venous bilateral    Result Date: 10/2/2022  No evidence of deep venous thrombosis in the bilateral lower extremities.         Labs: Results:       BMP, Mg, Phos Recent Labs     10/01/22  1405 10/02/22  0523 10/03/22  0325 10/04/22  0645   * 136* 139 140   K 4.0 4.0 3.8 3.8    104 109 107   CO2 28 28 26 26   ANIONGAP 4 4 4 7   BUN 14 13 12 15   CREATININE 1.30 1.20 1.20 1.40   LABGLOM 56* >60 >60 50*   GFRAA >60 >60 >60  --    CALCIUM 9.2 8.6 8.8 8.9   GLUCOSE 201* 148* 97 114*      CBC Recent Labs     10/02/22  0523 10/03/22  0325 10/04/22  0645   WBC 7.7 5.2 3.7*   RBC 3.71* 3.75* 3.99*   HGB 12.2* 12.3* 12.8*   HCT 36.3* 36.8* 38.8*   MCV 97.8 98.1* 97.2   MCH 32.9 32.8 32.1   MCHC 33.6 33.4 33.0   RDW 13.2 13.0 12.8   PLT 97* 111* 121*   MPV 10.4 11.5 11.0   NRBC 0.00 0.00 0.00   SEGS 76 72 58   LYMPHOPCT 11* 16 26   EOSRELPCT 1 2 4   MONOPCT 12 9 12   BASOPCT 0 0 0   IMMGRAN 1 1 0   SEGSABS 5.8 3.7 2.1   LYMPHSABS 0.8 0.8 1.0   EOSABS 0.1 0.1 0.2   MONOSABS 0.9 0.5 0.4   BASOSABS 0.0 0.0 0.0   ABSIMMGRAN 0.1 0.0 0.0      LFT Recent Labs     10/01/22  1405 10/02/22  0523 10/03/22  0325   BILITOT 0.6 0.7 0.6   BILIDIR  --  0.2  --    ALKPHOS 70 61 63   AST 10* 6* 10*   ALT 16 14 12   PROT 7.1 6.4 6.6   LABALBU 3.6 3.0* 2.8*   GLOB 3.5 3.4 3.8*      Cardiac  Lab Results   Component Value Date/Time    TROPHS 12.1 10/02/2022 09:05 AM    TROPHS 14.8 10/02/2022 05:23 AM    TROPHS 15.8 10/02/2022 01:12 AM      Coags Lab Results   Component Value Date/Time    PROTIME 15.3 10/02/2022 05:23 AM    INR 1.2 10/02/2022 05:23 AM    APTT 37.4 10/02/2022 05:23 AM      A1c Lab Results   Component Value Date/Time    LABA1C 7.0 10/01/2022 10:52 PM LABA1C 7.0 07/20/2022 10:22 AM    LABA1C 7.4 06/09/2022 02:38 PM     10/01/2022 10:52 PM     07/20/2022 10:22 AM     06/09/2022 02:38 PM      Lipids Lab Results   Component Value Date/Time    CHOL 98 06/09/2022 02:38 PM    LDLCALC 37.4 06/09/2022 02:38 PM    LABVLDL 18.6 06/09/2022 02:38 PM    HDL 42 06/09/2022 02:38 PM    CHOLHDLRATIO 2.3 06/09/2022 02:38 PM    TRIG 93 06/09/2022 02:38 PM      Thyroid  No results found for: Janet Stover     Most Recent UA Lab Results   Component Value Date/Time    COLORU YELLOW/STRAW 10/02/2022 04:27 AM    APPEARANCE CLEAR 10/02/2022 04:27 AM    SPECGRAV >1.035 10/02/2022 04:27 AM    LABPH 5.5 10/02/2022 04:27 AM    PROTEINU Negative 10/02/2022 04:27 AM    GLUCOSEU >1000 10/02/2022 04:27 AM    KETUA 15 10/02/2022 04:27 AM    BILIRUBINUR Negative 10/02/2022 04:27 AM    BLOODU Negative 10/02/2022 04:27 AM    UROBILINOGEN 0.2 10/02/2022 04:27 AM    NITRU Negative 10/02/2022 04:27 AM    LEUKOCYTESUR Negative 10/02/2022 04:27 AM    WBCUA 0-4 10/02/2022 04:27 AM    RBCUA 0-5 10/02/2022 04:27 AM    EPITHUA 0-5 10/02/2022 04:27 AM    BACTERIA Negative 10/02/2022 04:27 AM    LABCAST 0-2 10/02/2022 04:27 AM    MUCUS 0 10/02/2022 04:27 AM        Recent Labs     10/02/22  1410 10/01/22  2303 10/01/22  2252   CULTURE ORGANISM IN STUDY NO GROWTH 2 DAYS NO GROWTH 2 DAYS       All Labs from Last 24 Hrs:  Recent Results (from the past 24 hour(s))   CBC with Auto Differential    Collection Time: 10/04/22  6:45 AM   Result Value Ref Range    WBC 3.7 (L) 4.3 - 11.1 K/uL    RBC 3.99 (L) 4.23 - 5.6 M/uL    Hemoglobin 12.8 (L) 13.6 - 17.2 g/dL    Hematocrit 38.8 (L) 41.1 - 50.3 %    MCV 97.2 79.6 - 97.8 FL    MCH 32.1 26.1 - 32.9 PG    MCHC 33.0 31.4 - 35.0 g/dL    RDW 12.8 11.9 - 14.6 %    Platelets 117 (L) 195 - 450 K/uL    MPV 11.0 9.4 - 12.3 FL    nRBC 0.00 0.0 - 0.2 K/uL    Differential Type AUTOMATED      Seg Neutrophils 58 43 - 78 %    Lymphocytes 26 13 - 44 %    Monocytes 12 4.0 - 12.0 %    Eosinophils % 4 0.5 - 7.8 %    Basophils 0 0.0 - 2.0 %    Immature Granulocytes 0 0.0 - 5.0 %    Segs Absolute 2.1 1.7 - 8.2 K/UL    Absolute Lymph # 1.0 0.5 - 4.6 K/UL    Absolute Mono # 0.4 0.1 - 1.3 K/UL    Absolute Eos # 0.2 0.0 - 0.8 K/UL    Basophils Absolute 0.0 0.0 - 0.2 K/UL    Absolute Immature Granulocyte 0.0 0.0 - 0.5 K/UL   Basic Metabolic Panel w/ Reflex to MG    Collection Time: 10/04/22  6:45 AM   Result Value Ref Range    Sodium 140 138 - 145 mmol/L    Potassium 3.8 3.5 - 5.1 mmol/L    Chloride 107 101 - 110 mmol/L    CO2 26 21 - 32 mmol/L    Anion Gap 7 4 - 13 mmol/L    Glucose 114 (H) 65 - 100 mg/dL    BUN 15 8 - 23 MG/DL    Creatinine 1.40 0.8 - 1.5 MG/DL    Est, Glom Filt Rate 50 (L) >60 ml/min/1.73m2    Calcium 8.9 8.3 - 10.4 MG/DL   POCT Glucose    Collection Time: 10/04/22  7:17 AM   Result Value Ref Range    POC Glucose 107 (H) 65 - 100 mg/dL    Performed by: Nimesh Aquino    POCT Glucose    Collection Time: 10/04/22 11:37 AM   Result Value Ref Range    POC Glucose 198 (H) 65 - 100 mg/dL    Performed by: [facility]        No Known Allergies  Immunization History   Administered Date(s) Administered    COVID-19, MODERNA BLUE border, Primary or Immunocompromised, (age 12y+), IM, 100 mcg/0.5mL 01/21/2021, 02/18/2021    COVID-19, PFIZER PURPLE top, DILUTE for use, (age 15 y+), 30mcg/0.3mL 11/10/2021    Influenza Quadv 01/05/2017, 10/06/2020    Influenza, FLUARIX, FLULAVAL, FLUZONE (age 10 mo+) AND AFLURIA, (age 1 y+), PF, 0.5mL 10/16/2018, 10/04/2022    Influenza, FLUZONE (age 72 y+), High Dose, 0.7mL 10/07/2019    Pneumococcal Conjugate 13-valent ([id number]) 04/20/2017, 10/24/2019    Pneumococcal Polysaccharide ([id number]) 01/05/2017    Tetanus 07/06/2017       Recent Vital Data:  Patient Vitals for the past 24 hrs:   Temp Pulse Resp BP SpO2   10/04/22 0900 -- -- -- 131/69 --   10/04/22 0715 98.4 °F (36.9 °C) 65 17 116/71 92 %   10/04/22 0405 97.5 °F (36.4 °C) 67 20 124/71 94 %   10/03/22 2332 98.1 °F (36.7 °C) 69 20 102/66 94 %   10/03/22 1933 97.9 °F (36.6 °C) 67 20 106/61 95 %   10/03/22 1515 97.3 °F (36.3 °C) 68 18 97/61 95 %       Oxygen Therapy  SpO2: 92 %  O2 Device: None (Room air)  O2 Flow Rate (L/min): 2 L/min    Estimated body mass index is 24 kg/m² as calculated from the following:    Height as of this encounter: 5' 9\" (1.753 m). Weight as of this encounter: 162 lb 8 oz (73.7 kg). Intake/Output Summary (Last 24 hours) at 10/4/2022 1159  Last data filed at 10/4/2022 0911  Gross per 24 hour   Intake 171 ml   Output 2175 ml   Net -2004 ml         Physical Exam:    General:    Well nourished. No overt distress  Head:  Normocephalic, atraumatic  Eyes:  Sclerae appear normal.  Pupils equally round. HENT:  Nares appear normal, no drainage. Moist mucous membranes  Neck:  No restricted ROM. Trachea midline  CV:   RRR. No m/r/g. No JVD  Lungs:   CTAB. No wheezing, rhonchi, or rales. Respirations even, unlabored  Abdomen:   Soft, nontender, nondistended. Active bowel sounds, no organomegaly. Extremities: Warm and dry. No cyanosis or clubbing. No edema. Skin:     No rashes. Normal coloration  Neuro:  CN II-XII grossly intact. Psych:  Normal mood and affect. Signed:  Maury Baez MD    Part of this note may have been written by using a voice dictation software. The note has been proof read but may still contain some grammatical/other typographical errors.

## 2022-10-04 NOTE — PROGRESS NOTES
END OF SHIFT NOTE:    INTAKE/OUTPUT  10/03 0701 - 10/04 0700  In: 171 [I.V.:171]  Out: 1882 [Urine:1730]  Voiding: Yes  Catheter: No  Drain:              Flatus: Patient does have flatus present. Stool:  occurrences. Characteristics:           Stool Assessment  Last BM (including prior to admit): 10/03/22 (per patient)    Emesis:  occurrences. Characteristics:        VITAL SIGNS  Patient Vitals for the past 12 hrs:   Temp Pulse Resp BP SpO2   10/04/22 0715 98.4 °F (36.9 °C) 65 17 116/71 92 %   10/04/22 0405 97.5 °F (36.4 °C) 67 20 124/71 94 %   10/03/22 2332 98.1 °F (36.7 °C) 69 20 102/66 94 %   10/03/22 1933 97.9 °F (36.6 °C) 67 20 106/61 95 %       Pain Assessment  Pain Level: 3 (10/04/22 0156)  Pain Location: Head  Patient's Stated Pain Goal: 2    Ambulating  Yes    Shift report given to oncoming nurse at the bedside.     Stephanie James, RN

## 2022-10-04 NOTE — DISCHARGE INSTRUCTIONS
Diverticulitis: Care Instructions  Overview     Diverticulitis occurs when pouches form in the wall of the colon and become inflamed or infected. It can be very painful. Doctors aren't sure what causes diverticulitis. There is no proof that foods such as nuts, seeds, or berries cause it or make it worse. A low-fiber diet may cause the colon to work harder to push stool forward. Pouches may form because of this extra work. It may be hard to think about healthy eating while you're in pain. But as you recover, you might think about how you can use healthy eating for overall better health. Healthy eating may help you avoid future attacks. Follow-up care is a key part of your treatment and safety. Be sure to make and go to all appointments, and call your doctor if you are having problems. It's also a good idea to know your test results and keep a list of the medicines you take. How can you care for yourself at home? Drink plenty of fluids. If you have kidney, heart, or liver disease and have to limit fluids, talk with your doctor before you increase the amount of fluids you drink. Stay with liquids or a bland diet (plain rice, bananas, dry toast or crackers, applesauce) until you are feeling better. Then you can return to regular foods and slowly increase the amount of fiber in your diet. Use a heating pad set on low on your belly to relieve mild cramps and pain. Get extra rest until you are feeling better. Be safe with medicines. Read and follow all instructions on the label. If the doctor gave you a prescription medicine for pain, take it as prescribed. If you are not taking a prescription pain medicine, ask your doctor if you can take an over-the-counter medicine. If your doctor prescribed antibiotics, take them as directed. Do not stop taking them just because you feel better. You need to take the full course of antibiotics.   Do not use laxatives or enemas unless your doctor tells you to use them.  When should you call for help? Call your doctor now or seek immediate medical care if:    You have a fever. You are vomiting. You have new or worse belly pain. You cannot pass stools or gas. Watch closely for changes in your health, and be sure to contact your doctor if you have any problems. Where can you learn more? Go to [email or web address] and sign in to your Storytree account. Enter H901 in the Jaspersoft box to learn more about \"Diverticulitis: Care Instructions. \"     If you do not have an account, please click on the \"Sign Up Now\" link. Current as of: June 6, 2022               Content Version: 13.4  © 2006-2022 [employer]wise, Incorporated. Care instructions adapted under license by [facility] ([facility]). If you have questions about a medical condition or this instruction, always ask your healthcare professional. [address] any warranty or liability for your use of this information.

## 2022-10-05 ENCOUNTER — CARE COORDINATION (OUTPATIENT)
Dept: CARE COORDINATION | Facility: CLINIC | Age: 82
End: 2022-10-05

## 2022-10-05 LAB
BACTERIA SPEC CULT: NORMAL
SERVICE CMNT-IMP: NORMAL

## 2022-10-05 NOTE — CARE COORDINATION
Ambulatory Care Coordination Note  10/5/2022    ACC: Jia Alan RN    Ccm outreached to patient. Patient agreeable to ccm services. Ccm is familiar with patient. Patient was readmitted to hospital d/t diverticulitis. Reviewed with patient discharge summary. Patient verbalized understanding. Reviewed with patent new medications prescribed. Patient is taking as directed. Patient states overall feeling much better. Patient sates no questions or concerns. [facility] discussed that I would outreach next week. Patient agreeable. Offered patient enrollment in the Remote Patient Monitoring (RPM) program for in-home monitoring: NA. Ambulatory Care Coordination Assessment    Care Coordination Protocol  Referral from Primary Care Provider: No  Week 1 - Initial Assessment     Do you have all of your prescriptions and are they filled?: Yes  Barriers to medication adherence: None  Are you able to afford your medications?: Yes  How often do you have trouble taking your medications the way you have been told to take them?: I always take them as prescribed. Do you have Home O2 Therapy?: No      Ability to seek help/take action for Emergent Urgent situations i.e. fire, crime, inclement weather or health crisis. : Independent  Ability to ambulate to restroom: Independent  Ability handle personal hygeine needs (bathing/dressing/grooming): Independent  Ability to manage Medications: Independent  Ability to prepare Food Preparation: Independent  Ability to maintain home (clean home, laundry): Independent  Ability to drive and/or has transportation: Independent  Ability to do shopping: Independent  Ability to manage finances:  Independent  Is patient able to live independently?: Yes     Current Housing: Private Residence           Frequent urination at night?: No  Do you use rails/bars?: No  Do you have a non-slip tub mat?: Yes     Are you experiencing loss of meaning?: No  Are you experiencing loss of hope and peace?: No Suggested Interventions and Community Resources                  Prior to Admission medications    Medication Sig Start Date End Date Taking? Authorizing Provider   polyethylene glycol (GLYCOLAX) 17 g packet Take 17 g by mouth daily as needed for Constipation 10/4/22 10/19/22  Leonel Mccoy Ala, MD   amoxicillin-clavulanate (AUGMENTIN) 875-125 MG per tablet Take 1 tablet by mouth 2 times daily for 10 days 10/4/22 10/14/22  Leonel Mccoy Ala, MD   saccharomyces boulardii (FLORASTOR) 250 MG capsule Take 1 capsule by mouth 2 times daily for 7 days 10/4/22 10/11/22  MD CLAUDIA Mcdaniel Ala-D ULTRAFINE III SHORT PEN 31G X 8 MM MISC Inject 1 each into the skin daily 8/23/22   XI Bonilla NP   losartan (COZAAR) 25 MG tablet Take 25 mg by mouth daily    Historical Provider, MD   tamsulosin (FLOMAX) 0.4 MG capsule Take 1 capsule by mouth daily 9/12/22   Marvin Craig MD   finasteride (PROSCAR) 5 MG tablet Take 1 tablet by mouth daily 9/12/22   Marvin Craig MD   metoprolol succinate (TOPROL XL) 25 MG extended release tablet Take 1 tablet by mouth in the morning.  8/2/22   XI Bonilla NP   Thelma Burnegro ULTRA strip Inject 1 each into the skin daily  5/29/22   XI Bonilla NP   B Complex Vitamins (B COMPLEX PO) Take 1 tablet by mouth daily    Historical Provider, MD   cyanocobalamin 1000 MCG tablet Take 1,000 mcg by mouth daily     Historical Provider, MD   Omega-3 Fatty Acids (FISH OIL OMEGA-3 PO) Take by mouth daily    Historical Provider, MD   Coenzyme Q10 (CO Q-10 PO) Take by mouth daily    Historical Provider, MD   empagliflozin (JARDIANCE) 25 MG tablet Take 1 tablet by mouth daily 6/9/22   XI Bonilla NP   ezetimibe (ZETIA) 10 mg tablet Take 1 tablet by mouth daily 6/9/22   XI Bonilla NP   glimepiride (AMARYL) 2 MG tablet Take 1 tablet by mouth daily 6/9/22   Everton Pippins, APRN - NP   insulin glargine (LANTUS;BASAGLAR) 100 UNIT/ML injection pen Inject 10 Units into the skin daily 6/9/22   XI Daniels NP   rosuvastatin (CRESTOR) 20 MG tablet Take 1 tablet by mouth daily 6/9/22   XI Daniels NP   BABY ASPIRIN PO Take by mouth daily    Ar Automatic Reconciliation       Future Appointments   Date Time Provider Josesito Silva   10/17/2022  9:30 AM Lisa Graves MD [facility]   10/20/2022  2:20 PM XI Daniels NP PRE [facility] AM[facility]   3/7/2023  2:20 PM XI Daniels NP PRE [facility] AM[facility]

## 2022-10-06 LAB
BACTERIA SPEC CULT: NORMAL
BACTERIA SPEC CULT: NORMAL
SERVICE CMNT-IMP: NORMAL
SERVICE CMNT-IMP: NORMAL

## 2022-10-10 LAB
HEPARIN INDUCED PLATELET ANTIBODY: NEGATIVE
HIT INTERPRETATION: NEGATIVE
HIT OPTICAL DENSITY: 0.08 ABS
HIT PROFILE: NORMAL

## 2022-10-11 ENCOUNTER — CARE COORDINATION (OUTPATIENT)
Dept: CARE COORDINATION | Facility: CLINIC | Age: 82
End: 2022-10-11

## 2022-10-11 NOTE — CARE COORDINATION
Ambulatory Care Management Note        Date/Time:  10/11/2022 3:41 PM    Ccm outreached to patient. No answer at this time, message left. Awaiting response. If no response, ccm will outreach next week.

## 2022-10-17 ENCOUNTER — OFFICE VISIT (OUTPATIENT)
Dept: UROLOGY | Age: 82
End: 2022-10-17
Payer: MEDICARE

## 2022-10-17 ENCOUNTER — TELEPHONE (OUTPATIENT)
Dept: UROLOGY | Age: 82
End: 2022-10-17

## 2022-10-17 DIAGNOSIS — R39.14 FEELING OF INCOMPLETE BLADDER EMPTYING: ICD-10-CM

## 2022-10-17 DIAGNOSIS — N40.0 BENIGN PROSTATIC HYPERPLASIA, UNSPECIFIED WHETHER LOWER URINARY TRACT SYMPTOMS PRESENT: Primary | ICD-10-CM

## 2022-10-17 PROCEDURE — 1123F ACP DISCUSS/DSCN MKR DOCD: CPT | Performed by: UROLOGY

## 2022-10-17 PROCEDURE — 99214 OFFICE O/P EST MOD 30 MIN: CPT | Performed by: UROLOGY

## 2022-10-17 PROCEDURE — 51798 US URINE CAPACITY MEASURE: CPT | Performed by: UROLOGY

## 2022-10-17 PROCEDURE — 81003 URINALYSIS AUTO W/O SCOPE: CPT | Performed by: UROLOGY

## 2022-10-17 ASSESSMENT — ENCOUNTER SYMPTOMS
RESPIRATORY NEGATIVE: 1
BACK PAIN: 1
EYES NEGATIVE: 1

## 2022-10-17 NOTE — PROGRESS NOTES
[address], [facility] [address]  [phone]          Jany Joseph  : 1940    Chief Complaint   Patient presents with    Benign Prostatic Hypertrophy          HPI     Jany Joseph is a 78 y.o. male    The patient was recently admitted to the hospital with diverticulitis. I reviewed his CT today and I would estimate his prostate volume at 97 cm³. Today his postvoid residual was 24 cc. This is in spite of being on Flomax and Proscar. Past Medical History:   Diagnosis Date    BPH (benign prostatic hyperplasia)     Diabetes ([facility] [address]     Heart disease     High cholesterol     Histoplasmosis     right eye    Kidney disease     Macular degeneration     legally blind     Past Surgical History:   Procedure Laterality Date    ABDOMINAL AORTIC ANEURYSM REPAIR      CATARACT REMOVAL      rigth eye    HX PARTIAL COLECTOMY      due to severe diverticulosis    OTHER SURGICAL HISTORY      stents to coronsary arteries. 2 at different times     Current Outpatient Medications   Medication Sig Dispense Refill    polyethylene glycol (GLYCOLAX) 17 g packet Take 17 g by mouth daily as needed for Constipation 15 each 0    B-D ULTRAFINE III SHORT PEN 31G X 8 MM MISC Inject 1 each into the skin daily      losartan (COZAAR) 25 MG tablet Take 25 mg by mouth daily      tamsulosin (FLOMAX) 0.4 MG capsule Take 1 capsule by mouth daily 30 capsule 5    finasteride (PROSCAR) 5 MG tablet Take 1 tablet by mouth daily 30 tablet 5    metoprolol succinate (TOPROL XL) 25 MG extended release tablet Take 1 tablet by mouth in the morning.  90 tablet 1    ONETOUCH ULTRA strip Inject 1 each into the skin daily       B Complex Vitamins (B COMPLEX PO) Take 1 tablet by mouth daily      cyanocobalamin 1000 MCG tablet Take 1,000 mcg by mouth daily       Omega-3 Fatty Acids (FISH OIL OMEGA-3 PO) Take by mouth daily      Coenzyme Q10 (CO Q-10 PO) Take by mouth daily      empagliflozin (JARDIANCE) 25 MG tablet Take 1 tablet by mouth daily 90 tablet 1    ezetimibe (ZETIA) 10 mg tablet Take 1 tablet by mouth daily 90 tablet 1    glimepiride (AMARYL) 2 MG tablet Take 1 tablet by mouth daily 90 tablet 1    insulin glargine (LANTUS;BASAGLAR) 100 UNIT/ML injection pen Inject 10 Units into the skin daily 5 pen 5    rosuvastatin (CRESTOR) 20 MG tablet Take 1 tablet by mouth daily 90 tablet 1    BABY ASPIRIN PO Take by mouth daily       No current facility-administered medications for this visit. No Known Allergies  Social History     Socioeconomic History    Marital status:      Spouse name: Not on file    Number of children: Not on file    Years of education: Not on file    Highest education level: Not on file   Occupational History    Not on file   Tobacco Use    Smoking status: Former     Packs/day: 2.00     Types: Cigarettes     Quit date: 1989     Years since quittin.8    Smokeless tobacco: Never   Substance and Sexual Activity    Alcohol use: Yes    Drug use: Never    Sexual activity: Not on file   Other Topics Concern    Not on file   Social History Narrative     5  5 years ago just moved in  to an independent living facility here in [place] from [facility]. He has grown kids  Lawson Valenzuela daughter lives in [place] lives in [place].    Does not have a Health Care POA     Social Determinants of Health     Financial Resource Strain: Low Risk     Difficulty of Paying Living Expenses: Not hard at all   Food Insecurity: No Food Insecurity    Worried About Running Out of Food in the Last Year: Never true    Ran Out of Food in the Last Year: Never true   Transportation Needs: Not on file   Physical Activity: Not on file   Stress: Not on file   Social Connections: Not on file   Intimate Partner Violence: Not on file   Housing Stability: Not on file     Family History   Problem Relation Age of Onset    Abdominal aortic aneurysm Brother     Cancer Mother         esopageal Review of Systems  Constitutional: Negative  Skin: Negative skin ROS  Eyes: Eyes negative  ENT: HENT negative  Respiratory: Respiratory negative  Cardiovascular: Positive for hypertension. GI: Neg GI ROS  Genitourinary: Genitourinary negative  Musculoskeletal: Positive for back pain. Neurological: Neg neuro ROS  Psychological: Neg psych ROS  Endocrine: Endocrine negative  Hem/Lymphatic: Hematologic/lymphatic negative    Urinalysis  UA - Dipstick  Results for orders placed or performed in visit on 10/17/22   AMB POC URINALYSIS DIP STICK AUTO W/O MICRO   Result Value Ref Range    Color (UA POC)      Clarity (UA POC)      Glucose, Urine, POC 1000 Negative    Bilirubin, Urine, POC Negative Negative    KETONES, Urine, POC Negative Negative    Specific Gravity, Urine, POC 1.010 1.001 - 1.035    Blood (UA POC) Negative Negative    pH, Urine, POC 6.0 4.6 - 8.0    Protein, Urine, POC Negative Negative    Urobilinogen, POC 1 mg/dL     Nitrite, Urine, POC Negative Negative    Leukocyte Esterase, Urine, POC Negative Negative       UA - Micro  WBC - 0  RBC - 0  Bacteria - 0  Epith - 0        Assessment and Plan    ICD-10-CM    1. Benign prostatic hyperplasia, unspecified whether lower urinary tract symptoms present  N40.0 AMB POC URINALYSIS DIP STICK AUTO W/O MICRO        The patient has significant lower urinary tract symptoms with incomplete urinary retention. To this point he has not developed hydronephrosis however. I think he would benefit from TURP. He and I discussed the technique and risks of the procedure today at length. We will schedule this for the near future. He will continue Flomax and Proscar up until that time.

## 2022-10-19 ENCOUNTER — CARE COORDINATION (OUTPATIENT)
Dept: CARE COORDINATION | Facility: CLINIC | Age: 82
End: 2022-10-19

## 2022-10-19 NOTE — CARE COORDINATION
Ambulatory Care Coordination Note  10/19/2022    ACC: Hanny Ching, RN    Ccm outreached to patient. Patient states he is feeling much better. Patient had follow up with urology on 10/17/22. Patient states no questions or concerns. [facility] discussed that I would outreach next week. Patient agreeable. Offered patient enrollment in the Remote Patient Monitoring (RPM) program for in-home monitoring: NA. Lab Results       None            Care Coordination Interventions    Referral from Primary Care Provider: No  Suggested Interventions and Community Resources          Goals Addressed                   This Visit's Progress     Conditions and Symptoms   On track     I will schedule office visits, as directed by my provider. I will keep my appointment or reschedule if I have to cancel. I will notify my provider of any barriers to my plan of care. I will notify my provider of any symptoms that indicate a worsening of my condition. Barriers: none  Plan for overcoming my barriers: N/A  Confidence: 9/10  Anticipated Goal Completion Date: 11/9/22         Medication Management   On track     I will take my medication as directed. I will notify my provider of any problems with medications, like adverse effects or side effects. I will notify my provider/Care Coordinator if I am unable to afford my medications. I will notify my provider for advice before I stop taking any of my medication. Barriers: none  Plan for overcoming my barriers: N/A  Confidence: 9/10  Anticipated Goal Completion Date: 11/9/22              Prior to Admission medications    Medication Sig Start Date End Date Taking?  Authorizing Provider   polyethylene glycol (GLYCOLAX) 17 g packet Take 17 g by mouth daily as needed for Constipation 10/4/22 10/19/22  Kimberlyn Bruno Ala, MD   B-D ULTRAFINE III SHORT PEN 31G X 8 MM MISC Inject 1 each into the skin daily 8/23/22   [facility], APRN - NP   losartan (COZAAR) 25 MG tablet Take 25 mg by mouth daily Historical Provider, MD   tamsulosin [facility]) 0.4 MG capsule Take 1 capsule by mouth daily 9/12/22   Marvin Craig MD   finasteride (PROSCAR) 5 MG tablet Take 1 tablet by mouth daily 9/12/22   Marvin Craig MD   metoprolol succinate (TOPROL XL) 25 MG extended release tablet Take 1 tablet by mouth in the morning.  8/2/22   XI Bonilla NP   Thelma Bury ULTRA strip Inject 1 each into the skin daily  5/29/22   XI Bonilla NP   B Complex Vitamins (B COMPLEX PO) Take 1 tablet by mouth daily    Historical Provider, MD   cyanocobalamin 1000 MCG tablet Take 1,000 mcg by mouth daily     Historical Provider, MD   Omega-3 Fatty Acids (FISH OIL OMEGA-3 PO) Take by mouth daily    Historical Provider, MD   Coenzyme Q10 (CO Q-10 PO) Take by mouth daily    Historical Provider, MD   empagliflozin (JARDIANCE) 25 MG tablet Take 1 tablet by mouth daily 6/9/22   XI Bonilla NP   ezetimibe (ZETIA) 10 mg tablet Take 1 tablet by mouth daily 6/9/22   XI Bonilla NP   glimepiride (AMARYL) 2 MG tablet Take 1 tablet by mouth daily 6/9/22   XI Bonilla NP   insulin glargine (LANTUS;BASAGLAR) 100 UNIT/ML injection pen Inject 10 Units into the skin daily 6/9/22   XI Bonilla NP   rosuvastatin (CRESTOR) 20 MG tablet Take 1 tablet by mouth daily 6/9/22   XI Bonilla NP   BABY ASPIRIN PO Take by mouth daily    Ar Automatic Reconciliation       Future Appointments   Date Time Provider Josesito Silva   10/20/2022  2:20 PM XI Bonilla NP PRE [facility] AMB   3/7/2023  2:20 PM XI Bonilla NP PRE [facility] AMB

## 2022-10-20 ENCOUNTER — OFFICE VISIT (OUTPATIENT)
Dept: FAMILY MEDICINE CLINIC | Facility: CLINIC | Age: 82
End: 2022-10-20
Payer: MEDICARE

## 2022-10-20 VITALS
WEIGHT: 161 LBS | BODY MASS INDEX: 23.85 KG/M2 | HEIGHT: 69 IN | SYSTOLIC BLOOD PRESSURE: 111 MMHG | DIASTOLIC BLOOD PRESSURE: 67 MMHG | HEART RATE: 74 BPM | TEMPERATURE: 97.6 F

## 2022-10-20 DIAGNOSIS — K57.92 DIVERTICULITIS: Primary | ICD-10-CM

## 2022-10-20 DIAGNOSIS — Z79.4 TYPE 2 DIABETES MELLITUS WITHOUT COMPLICATION, WITH LONG-TERM CURRENT USE OF INSULIN (HCC): ICD-10-CM

## 2022-10-20 DIAGNOSIS — E11.9 TYPE 2 DIABETES MELLITUS WITHOUT COMPLICATION, WITH LONG-TERM CURRENT USE OF INSULIN (HCC): ICD-10-CM

## 2022-10-20 PROCEDURE — 1123F ACP DISCUSS/DSCN MKR DOCD: CPT | Performed by: NURSE PRACTITIONER

## 2022-10-20 PROCEDURE — 3051F HG A1C>EQUAL 7.0%<8.0%: CPT | Performed by: NURSE PRACTITIONER

## 2022-10-20 PROCEDURE — 99214 OFFICE O/P EST MOD 30 MIN: CPT | Performed by: NURSE PRACTITIONER

## 2022-10-20 ASSESSMENT — PATIENT HEALTH QUESTIONNAIRE - PHQ9
7. TROUBLE CONCENTRATING ON THINGS, SUCH AS READING THE NEWSPAPER OR WATCHING TELEVISION: 0
5. POOR APPETITE OR OVEREATING: 0
6. FEELING BAD ABOUT YOURSELF - OR THAT YOU ARE A FAILURE OR HAVE LET YOURSELF OR YOUR FAMILY DOWN: 0
4. FEELING TIRED OR HAVING LITTLE ENERGY: 0
SUM OF ALL RESPONSES TO PHQ QUESTIONS 1-9: 0
10. IF YOU CHECKED OFF ANY PROBLEMS, HOW DIFFICULT HAVE THESE PROBLEMS MADE IT FOR YOU TO DO YOUR WORK, TAKE CARE OF THINGS AT HOME, OR GET ALONG WITH OTHER PEOPLE: 0
SUM OF ALL RESPONSES TO PHQ QUESTIONS 1-9: 0
SUM OF ALL RESPONSES TO PHQ QUESTIONS 1-9: 0
1. LITTLE INTEREST OR PLEASURE IN DOING THINGS: 0
SUM OF ALL RESPONSES TO PHQ QUESTIONS 1-9: 0
SUM OF ALL RESPONSES TO PHQ9 QUESTIONS 1 & 2: 0
3. TROUBLE FALLING OR STAYING ASLEEP: 0
8. MOVING OR SPEAKING SO SLOWLY THAT OTHER PEOPLE COULD HAVE NOTICED. OR THE OPPOSITE, BEING SO FIGETY OR RESTLESS THAT YOU HAVE BEEN MOVING AROUND A LOT MORE THAN USUAL: 0
2. FEELING DOWN, DEPRESSED OR HOPELESS: 0
9. THOUGHTS THAT YOU WOULD BE BETTER OFF DEAD, OR OF HURTING YOURSELF: 0

## 2022-10-20 NOTE — PROGRESS NOTES
Subjective:      Patient ID: Yudelka Ayala is a 78 y.o. male. Was in the hospital for diverticulitis 2 weeks ago and had resolution of symptoms. He saw GI in the hospital   DM follow up BS good less than 150   He saw Dr Amanuel Hodgson and hs had an ultrasound and was told to get a surgery for obstruction. He is taking Yoga going to the . Plans to move back to [place]  to be near his daughter very happy to have a new plan for his life. Has two issues   He itches at night thinks ay be related to using old insulin When he changed pens it got better. Does not want to chnge anything right now will let us know if it recurs. He s eating well starting getting [employer] and loves the cooking    HPI    Review of Systems   Constitutional:  Negative for fatigue. Respiratory:  Negative for shortness of breath. Cardiovascular:  Negative for chest pain and leg swelling. Genitourinary:  Positive for difficulty urinating. Neurological:  Negative for dizziness. Objective:   Physical Exam  Vitals and nursing note reviewed. Constitutional:       Appearance: Normal appearance. Cardiovascular:      Rate and Rhythm: Normal rate and regular rhythm. Pulses: Normal pulses. Heart sounds: Normal heart sounds. Pulmonary:      Effort: Pulmonary effort is normal.      Breath sounds: Normal breath sounds. Musculoskeletal:         General: Normal range of motion. Skin:     General: Skin is warm and dry. Neurological:      Mental Status: He is alert. Psychiatric:         Mood and Affect: Mood normal.       Assessment:      /67 (Site: Left Upper Arm, Position: Sitting, Cuff Size: Medium Adult)   Pulse 74   Temp 97.6 °F (36.4 °C) (Temporal)   Ht 5' 9\" (1.753 m)   Wt 161 lb (73 kg)   BMI 23.78 kg/m²        Plan:      1. Diverticulitis  2.  Type 2 diabetes mellitus without complication, with long-term current use of insulin (HCC)     Go to er for any issues with recurrent diverticulitis as has not respond to oral antibiotic for this. Keep working with urology for his prostate issues.  Sounds like is doing well    XI Bonilla - NP

## 2022-10-21 ASSESSMENT — ENCOUNTER SYMPTOMS: SHORTNESS OF BREATH: 0

## 2022-10-25 ENCOUNTER — CARE COORDINATION (OUTPATIENT)
Dept: CARE COORDINATION | Facility: CLINIC | Age: 82
End: 2022-10-25

## 2022-10-25 NOTE — CARE COORDINATION
Ambulatory Care Coordination Note  10/25/2022    ACC: Karyle Bees, RN    Ccm outreached to patient. Patient states he is doing well. Patient states no questions or concerns. Ccm discussed that I would outreach next week. Patient agreeable. Offered patient enrollment in the Remote Patient Monitoring (RPM) program for in-home monitoring: NA. Lab Results       None            Care Coordination Interventions    Referral from Primary Care Provider: No  Suggested Interventions and Community Resources          Goals Addressed                   This Visit's Progress     Conditions and Symptoms   On track     I will schedule office visits, as directed by my provider. I will keep my appointment or reschedule if I have to cancel. I will notify my provider of any barriers to my plan of care. I will notify my provider of any symptoms that indicate a worsening of my condition. Barriers: none  Plan for overcoming my barriers: N/A  Confidence: 9/10  Anticipated Goal Completion Date: 11/9/22         Medication Management   On track     I will take my medication as directed. I will notify my provider of any problems with medications, like adverse effects or side effects. I will notify my provider/Care Coordinator if I am unable to afford my medications. I will notify my provider for advice before I stop taking any of my medication. Barriers: none  Plan for overcoming my barriers: N/A  Confidence: 9/10  Anticipated Goal Completion Date: 11/9/22              Prior to Admission medications    Medication Sig Start Date End Date Taking?  Authorizing Provider   Insulin Degludec 100 UNIT/ML SOPN Inject 10 Units into the skin daily 10/24/22   XI Miller NP   B-D ULTRAFINE III SHORT PEN 31G X 8 MM MISC Inject 1 each into the skin daily 8/23/22   XI Miller NP   losartan (COZAAR) 25 MG tablet Take 25 mg by mouth daily    Historical Provider, MD   tamsulosin (FLOMAX) 0.4 MG capsule Take 1 capsule by mouth daily 9/12/22   Kimberly Dorman MD   finasteride (PROSCAR) 5 MG tablet Take 1 tablet by mouth daily 9/12/22   Kimberly Dorman MD   metoprolol succinate (TOPROL XL) 25 MG extended release tablet Take 1 tablet by mouth in the morning.  8/2/22   XI Rosario NP   [facility] ULTRA strip Inject 1 each into the skin daily  5/29/22   XI Rosario NP   B Complex Vitamins (B COMPLEX PO) Take 1 tablet by mouth daily    Historical Provider, MD   cyanocobalamin 1000 MCG tablet Take 1,000 mcg by mouth daily     Historical Provider, MD   Omega-3 Fatty Acids (FISH OIL OMEGA-3 PO) Take by mouth daily    Historical Provider, MD   Coenzyme Q10 (CO Q-10 PO) Take by mouth daily    Historical Provider, MD   empagliflozin (JARDIANCE) 25 MG tablet Take 1 tablet by mouth daily 6/9/22   XI Rosario NP   ezetimibe (ZETIA) 10 mg tablet Take 1 tablet by mouth daily 6/9/22   XI Rosario NP   glimepiride (AMARYL) 2 MG tablet Take 1 tablet by mouth daily 6/9/22   XI Rosario NP   insulin glargine (LANTUS;BASAGLAR) 100 UNIT/ML injection pen Inject 10 Units into the skin daily 6/9/22   IX Rosario NP   rosuvastatin (CRESTOR) 20 MG tablet Take 1 tablet by mouth daily 6/9/22   XI Rosario NP   BABY ASPIRIN PO Take by mouth daily    Ar Automatic Reconciliation       Future Appointments   Date Time Provider Josesito Silva   12/6/2022 10:40 AM XI Rosario NP PRE [facility] AMB   3/7/2023  2:20 PM XI Rosario NP PRE [facility] AMB

## 2022-10-28 PROBLEM — N40.1 HYPERPLASIA OF PROSTATE WITH LOWER URINARY TRACT SYMPTOMS (LUTS): Status: ACTIVE | Noted: 2022-10-28

## 2022-10-28 NOTE — TELEPHONE ENCOUNTER
Procedures: Procedure(s):   CYSTOSCOPY TRANSURETHRAL RESECTION PROSTATE   Date: 11/18/2022   Time: 1100   Location: [facility] MAIN OR 01 CYSTO       Please have Dr Ivette Breaux do orders

## 2022-11-01 ENCOUNTER — CARE COORDINATION (OUTPATIENT)
Dept: CARE COORDINATION | Facility: CLINIC | Age: 82
End: 2022-11-01

## 2022-11-01 NOTE — CARE COORDINATION
Ambulatory Care Coordination Note  11/1/2022    ACC: Natasha Whyte RN    Ccm outreached to patient. Patient states he is doing well at this time. Patient states no questions or concerns. Ccm dicussed that I would outreach next week. Patient agreeable. Offered patient enrollment in the Remote Patient Monitoring (RPM) program for in-home monitoring: NA. Lab Results       None            Care Coordination Interventions    Referral from Primary Care Provider: No  Suggested Interventions and Community Resources          Goals Addressed                   This Visit's Progress     Conditions and Symptoms   On track     I will schedule office visits, as directed by my provider. I will keep my appointment or reschedule if I have to cancel. I will notify my provider of any barriers to my plan of care. I will notify my provider of any symptoms that indicate a worsening of my condition. Barriers: none  Plan for overcoming my barriers: N/A  Confidence: 9/10  Anticipated Goal Completion Date: 11/9/22         Medication Management   On track     I will take my medication as directed. I will notify my provider of any problems with medications, like adverse effects or side effects. I will notify my provider/Care Coordinator if I am unable to afford my medications. I will notify my provider for advice before I stop taking any of my medication. Barriers: none  Plan for overcoming my barriers: N/A  Confidence: 9/10  Anticipated Goal Completion Date: 11/9/22              Prior to Admission medications    Medication Sig Start Date End Date Taking?  Authorizing Provider   Insulin Degludec 100 UNIT/ML SOPN Inject 10 Units into the skin daily 10/24/22   XI Bradley NP   B-D ULTRAFINE III SHORT PEN 31G X 8 MM MISC Inject 1 each into the skin daily 8/23/22   XI Bradley NP   losartan (COZAAR) 25 MG tablet Take 25 mg by mouth daily    Historical Provider, MD   tamsulosin (FLOMAX) 0.4 MG capsule Take 1 capsule by mouth daily 9/12/22   Josias Gómez MD   finasteride (PROSCAR) 5 MG tablet Take 1 tablet by mouth daily 9/12/22   Josias Gómez MD   metoprolol succinate (TOPROL XL) 25 MG extended release tablet Take 1 tablet by mouth in the morning.  8/2/22   XI Womack NP   Ruth Brighter ULTRA strip Inject 1 each into the skin daily  5/29/22   XI Womack NP   B Complex Vitamins (B COMPLEX PO) Take 1 tablet by mouth daily    Historical Provider, MD   cyanocobalamin 1000 MCG tablet Take 1,000 mcg by mouth daily     Historical Provider, MD   Omega-3 Fatty Acids (FISH OIL OMEGA-3 PO) Take by mouth daily    Historical Provider, MD   Coenzyme Q10 (CO Q-10 PO) Take by mouth daily    Historical Provider, MD   empagliflozin (JARDIANCE) 25 MG tablet Take 1 tablet by mouth daily 6/9/22   XI Womack NP   ezetimibe (ZETIA) 10 mg tablet Take 1 tablet by mouth daily 6/9/22   XI Womack NP   glimepiride (AMARYL) 2 MG tablet Take 1 tablet by mouth daily 6/9/22   XI Womack NP   insulin glargine (LANTUS;BASAGLAR) 100 UNIT/ML injection pen Inject 10 Units into the skin daily 6/9/22   XI Womack NP   rosuvastatin (CRESTOR) 20 MG tablet Take 1 tablet by mouth daily 6/9/22   XI Womack NP   BABY ASPIRIN PO Take by mouth daily    Ar Automatic Reconciliation       Future Appointments   Date Time Provider Josesito Silva   11/11/2022  1:00 PM [facility]MENT RM [facility]   12/6/2022 10:40 AM XI Womack NP PRE [facility] AMB   3/7/2023  2:20 PM XI Womack NP PRE [facility] AMB

## 2022-11-08 ENCOUNTER — CARE COORDINATION (OUTPATIENT)
Dept: CARE COORDINATION | Facility: CLINIC | Age: 82
End: 2022-11-08

## 2022-11-08 NOTE — CARE COORDINATION
Ambulatory Care Management Note        Date/Time:  11/8/2022 11:53 AM    Ccm outreached to patient. No answer at this time, message left. Awaiting response. If no response, ccm will outreach next week.

## 2022-11-11 ENCOUNTER — HOSPITAL ENCOUNTER (OUTPATIENT)
Dept: PREADMISSION TESTING | Age: 82
Discharge: HOME | End: 2022-11-14
Payer: MEDICARE

## 2022-11-11 VITALS
HEIGHT: 69 IN | HEART RATE: 68 BPM | WEIGHT: 166.3 LBS | BODY MASS INDEX: 24.63 KG/M2 | DIASTOLIC BLOOD PRESSURE: 59 MMHG | RESPIRATION RATE: 16 BRPM | SYSTOLIC BLOOD PRESSURE: 102 MMHG | TEMPERATURE: 97.5 F | OXYGEN SATURATION: 94 %

## 2022-11-11 LAB — HGB BLD-MCNC: 12.7 G/DL (ref 13.6–17.2)

## 2022-11-11 PROCEDURE — 82962 GLUCOSE BLOOD TEST: CPT

## 2022-11-11 PROCEDURE — 85018 HEMOGLOBIN: CPT

## 2022-11-11 RX ORDER — MAGNESIUM OXIDE 400 MG/1
400 TABLET ORAL DAILY
COMMUNITY

## 2022-11-11 RX ORDER — POTASSIUM CHLORIDE 1.5 G/1.77G
20 POWDER, FOR SOLUTION ORAL DAILY
COMMUNITY

## 2022-11-11 NOTE — PROGRESS NOTES
Patient verified name and     Order for consent NOT found in EHR; patient verified. Type 2 surgery, walk-in assessment complete. Labs per surgeon: NONE;   Labs per anesthesia protocol: HGB, T/S s/h dos, POC Glucose- 204; results pending  EKG: 10/01/2022; in box for anesthesia review, chart flagged for CN to follow up    POC glucose 204. Instructed  Patient that if blood sugar 300 or > , surgery may be cancelled. Called and spoke with Neda Rose. at Dr. Chiquita Ribeiro office, per Roro Ceballos, Dr. Claude Landry is okay with patient staying on a bASA for surgery. Chart in box for anesthesia to review stress test, ekgs, echo and if rep is needed dos for defibrillator. Chart flagged for CN to follow up. Hospital approved surgical skin cleanser and instructions given per hospital policy. Patient provided with and instructed on educational handouts including Guide to Surgery, Pain Management, Hand Hygiene, Blood Transfusion Education, and [facility] Brochure. Patient answered medical/surgical history questions at their best of ability. All prior to admission medications documented in [facility]. Original medication prescription bottle NOT visualized during patient appointment. Patient instructed to hold all vitamins 7 days prior to surgery and NSAIDS 5 days prior to surgery, patient verbalized understanding. Patient teach back successful and patient demonstrates knowledge of instructions. PLEASE CONTINUE TAKING ALL PRESCRIPTION MEDICATIONS UP TO THE DAY OF SURGERY UNLESS OTHERWISE DIRECTED BELOW. DISCONTINUE all vitamins and supplements 7 days prior to surgery. DISCONTINUE Non-Steriodal Anti-Inflammatory (NSAIDS) such as Advil, Aleve, Excedrin, Motrin and Aleve 5 days prior to surgery.      Home Medications to take  the day of surgery      Basaglar insulin- take 8 units the morning of surgery    Aspirin 81 mg   Metoprolol    Finasteride  Tamsulosin      Home Medications   to Hold Comments            Hibiclens shower the night before surgery and the morning of surgery. Please do not bring home medications with you on the day of surgery unless otherwise directed by your nurse. If you are instructed to bring home medications, please give them to your nurse as they will be administered by the nursing staff. If you have any questions, please call [facility] [phone] or [address] [place] [address] [phone]. A copy of this note was provided to the patient for reference.

## 2022-11-14 LAB
GLUCOSE BLD STRIP.AUTO-MCNC: 204 MG/DL (ref 65–100)
SERVICE CMNT-IMP: ABNORMAL

## 2022-11-15 ENCOUNTER — CARE COORDINATION (OUTPATIENT)
Dept: CARE COORDINATION | Facility: CLINIC | Age: 82
End: 2022-11-15

## 2022-11-15 NOTE — CARE COORDINATION
Ambulatory Care Coordination Note  11/15/2022    ACC: Elvie Riedel, RN    Ccm outreached to patient. Patient states he has prostrate surgery this Friday. patient states overall doing well. Patient states no questions or concerns. [facility] discussed that I would outreach next week. Patient agreeable. Offered patient enrollment in the Remote Patient Monitoring (RPM) program for in-home monitoring: NA. Lab Results       None            Care Coordination Interventions    Referral from Primary Care Provider: No  Suggested Interventions and Community Resources          Goals Addressed                   This Visit's Progress     Conditions and Symptoms   On track     I will schedule office visits, as directed by my provider. I will keep my appointment or reschedule if I have to cancel. I will notify my provider of any barriers to my plan of care. I will notify my provider of any symptoms that indicate a worsening of my condition. Barriers: none  Plan for overcoming my barriers: N/A  Confidence: 9/10  Anticipated Goal Completion Date: 11/9/22         Medication Management   On track     I will take my medication as directed. I will notify my provider of any problems with medications, like adverse effects or side effects. I will notify my provider/Care Coordinator if I am unable to afford my medications. I will notify my provider for advice before I stop taking any of my medication. Barriers: none  Plan for overcoming my barriers: N/A  Confidence: 9/10  Anticipated Goal Completion Date: 11/9/22              Prior to Admission medications    Medication Sig Start Date End Date Taking?  Authorizing Provider   potassium chloride (KLOR-CON) 20 MEQ packet Take 20 mEq by mouth daily    Historical Provider, MD   magnesium oxide (MAG-OX) 400 MG tablet Take 400 mg by mouth daily    Historical Provider, MD TABARES ULTRAFINE III SHORT PEN 31G X 8 MM MISC Inject 1 each into the skin daily 8/23/22   Justus Casas APRN - NP   losartan (COZAAR) 25 MG tablet Take 25 mg by mouth Daily with lunch    Historical Provider, MD   tamsulosin (FLOMAX) 0.4 MG capsule Take 1 capsule by mouth daily  Patient taking differently: Take 0.4 mg by mouth Daily with lunch 9/12/22   Lupe Galvez MD   finasteride (PROSCAR) 5 MG tablet Take 1 tablet by mouth daily  Patient taking differently: Take 5 mg by mouth Daily with lunch 9/12/22   Lupe Galvez MD   metoprolol succinate (TOPROL XL) 25 MG extended release tablet Take 1 tablet by mouth in the morning.   Patient taking differently: Take 25 mg by mouth Daily with lunch 8/2/22   XI Coronel NP   Brand Clore ULTRA strip Inject 1 each into the skin daily  5/29/22   XI Coronel NP   B Complex Vitamins (B COMPLEX PO) Take 1 tablet by mouth daily    Historical Provider, MD   cyanocobalamin 1000 MCG tablet Take 1,000 mcg by mouth daily     Historical Provider, MD   Omega-3 Fatty Acids (FISH OIL OMEGA-3 PO) Take by mouth daily    Historical Provider, MD   Coenzyme Q10 (CO Q-10 PO) Take by mouth daily    Historical Provider, MD   empagliflozin (JARDIANCE) 25 MG tablet Take 1 tablet by mouth daily  Patient taking differently: Take 25 mg by mouth Daily with lunch 6/9/22   XI Coronel NP   ezetimibe (ZETIA) 10 mg tablet Take 1 tablet by mouth daily  Patient taking differently: Take 10 mg by mouth at bedtime 6/9/22   XI Coronel NP   glimepiride (AMARYL) 2 MG tablet Take 1 tablet by mouth daily 6/9/22   XI Coronel NP   insulin glargine (LANTUS;BASAGLAR) 100 UNIT/ML injection pen Inject 10 Units into the skin daily 6/9/22   XI Coronel NP   rosuvastatin (CRESTOR) 20 MG tablet Take 1 tablet by mouth daily  Patient taking differently: Take 20 mg by mouth at bedtime 6/9/22   XI Coronel NP   BABY ASPIRIN PO Take by mouth Daily with lunch    Ar Automatic Reconciliation       Future Appointments   Date Time Provider Josesito Silva   12/6/2022 10:40 AM XI Boggs NP PRE [facility] AMB   3/7/2023  2:20 PM XI Boggs NP PRE [facility] AMB

## 2022-11-17 ENCOUNTER — ANESTHESIA EVENT (OUTPATIENT)
Dept: SURGERY | Age: 82
End: 2022-11-17
Payer: MEDICARE

## 2022-11-17 NOTE — PERIOP NOTE
Directly informed patient and or family member of pre op arrival time 46 on 11/18. All questions answered. Pre op instructions reviewed. Left contact information for any additional questions or needs.

## 2022-11-18 ENCOUNTER — ANESTHESIA (OUTPATIENT)
Dept: SURGERY | Age: 82
End: 2022-11-18
Payer: MEDICARE

## 2022-11-18 ENCOUNTER — HOSPITAL ENCOUNTER (OUTPATIENT)
Age: 82
Setting detail: OBSERVATION
Discharge: HOME | End: 2022-11-20
Attending: UROLOGY | Admitting: UROLOGY
Payer: MEDICARE

## 2022-11-18 DIAGNOSIS — Z90.79 S/P TURP: Primary | ICD-10-CM

## 2022-11-18 DIAGNOSIS — N40.1 HYPERPLASIA OF PROSTATE WITH LOWER URINARY TRACT SYMPTOMS (LUTS): ICD-10-CM

## 2022-11-18 LAB
ABO + RH BLD: NORMAL
BLOOD GROUP ANTIBODIES SERPL: NORMAL
GLUCOSE BLD STRIP.AUTO-MCNC: 108 MG/DL (ref 65–100)
GLUCOSE BLD STRIP.AUTO-MCNC: 127 MG/DL (ref 65–100)
GLUCOSE BLD STRIP.AUTO-MCNC: 317 MG/DL (ref 65–100)
SERVICE CMNT-IMP: ABNORMAL
SPECIMEN EXP DATE BLD: NORMAL

## 2022-11-18 PROCEDURE — 6360000002 HC RX W HCPCS: Performed by: NURSE ANESTHETIST, CERTIFIED REGISTERED

## 2022-11-18 PROCEDURE — 2720000010 HC SURG SUPPLY STERILE: Performed by: UROLOGY

## 2022-11-18 PROCEDURE — 3600000014 HC SURGERY LEVEL 4 ADDTL 15MIN: Performed by: UROLOGY

## 2022-11-18 PROCEDURE — G0378 HOSPITAL OBSERVATION PER HR: HCPCS

## 2022-11-18 PROCEDURE — 7100000001 HC PACU RECOVERY - ADDTL 15 MIN: Performed by: UROLOGY

## 2022-11-18 PROCEDURE — 6370000000 HC RX 637 (ALT 250 FOR IP): Performed by: ANESTHESIOLOGY

## 2022-11-18 PROCEDURE — 6360000002 HC RX W HCPCS: Performed by: UROLOGY

## 2022-11-18 PROCEDURE — 2709999900 HC NON-CHARGEABLE SUPPLY: Performed by: UROLOGY

## 2022-11-18 PROCEDURE — 7100000000 HC PACU RECOVERY - FIRST 15 MIN: Performed by: UROLOGY

## 2022-11-18 PROCEDURE — 2580000003 HC RX 258: Performed by: UROLOGY

## 2022-11-18 PROCEDURE — 6370000000 HC RX 637 (ALT 250 FOR IP): Performed by: UROLOGY

## 2022-11-18 PROCEDURE — 86850 RBC ANTIBODY SCREEN: CPT

## 2022-11-18 PROCEDURE — 82962 GLUCOSE BLOOD TEST: CPT

## 2022-11-18 PROCEDURE — 3700000001 HC ADD 15 MINUTES (ANESTHESIA): Performed by: UROLOGY

## 2022-11-18 PROCEDURE — 2580000003 HC RX 258: Performed by: NURSE ANESTHETIST, CERTIFIED REGISTERED

## 2022-11-18 PROCEDURE — 2580000003 HC RX 258: Performed by: ANESTHESIOLOGY

## 2022-11-18 PROCEDURE — 3700000000 HC ANESTHESIA ATTENDED CARE: Performed by: UROLOGY

## 2022-11-18 PROCEDURE — 88305 TISSUE EXAM BY PATHOLOGIST: CPT

## 2022-11-18 PROCEDURE — 3600000004 HC SURGERY LEVEL 4 BASE: Performed by: UROLOGY

## 2022-11-18 PROCEDURE — 2500000003 HC RX 250 WO HCPCS: Performed by: NURSE ANESTHETIST, CERTIFIED REGISTERED

## 2022-11-18 RX ORDER — EPHEDRINE SULFATE/0.9% NACL/PF 50 MG/5 ML
SYRINGE (ML) INTRAVENOUS PRN
Status: DISCONTINUED | OUTPATIENT
Start: 2022-11-18 | End: 2022-11-18 | Stop reason: SDUPTHER

## 2022-11-18 RX ORDER — DEXAMETHASONE SODIUM PHOSPHATE 4 MG/ML
INJECTION, SOLUTION INTRA-ARTICULAR; INTRALESIONAL; INTRAMUSCULAR; INTRAVENOUS; SOFT TISSUE PRN
Status: DISCONTINUED | OUTPATIENT
Start: 2022-11-18 | End: 2022-11-18 | Stop reason: SDUPTHER

## 2022-11-18 RX ORDER — INSULIN GLARGINE 100 [IU]/ML
10 INJECTION, SOLUTION SUBCUTANEOUS NIGHTLY
Status: DISCONTINUED | OUTPATIENT
Start: 2022-11-18 | End: 2022-11-20 | Stop reason: HOSPADM

## 2022-11-18 RX ORDER — OXYCODONE HYDROCHLORIDE 5 MG/1
5 TABLET ORAL
Status: DISCONTINUED | OUTPATIENT
Start: 2022-11-18 | End: 2022-11-18 | Stop reason: HOSPADM

## 2022-11-18 RX ORDER — SODIUM CHLORIDE 0.9 % (FLUSH) 0.9 %
5-40 SYRINGE (ML) INJECTION EVERY 12 HOURS SCHEDULED
Status: DISCONTINUED | OUTPATIENT
Start: 2022-11-18 | End: 2022-11-18 | Stop reason: HOSPADM

## 2022-11-18 RX ORDER — DEXTROSE MONOHYDRATE 100 MG/ML
INJECTION, SOLUTION INTRAVENOUS CONTINUOUS PRN
Status: DISCONTINUED | OUTPATIENT
Start: 2022-11-18 | End: 2022-11-18 | Stop reason: HOSPADM

## 2022-11-18 RX ORDER — GLYCOPYRROLATE 0.2 MG/ML
INJECTION INTRAMUSCULAR; INTRAVENOUS PRN
Status: DISCONTINUED | OUTPATIENT
Start: 2022-11-18 | End: 2022-11-18 | Stop reason: SDUPTHER

## 2022-11-18 RX ORDER — EZETIMIBE 10 MG/1
10 TABLET ORAL DAILY
Status: DISCONTINUED | OUTPATIENT
Start: 2022-11-18 | End: 2022-11-20 | Stop reason: HOSPADM

## 2022-11-18 RX ORDER — NEOSTIGMINE METHYLSULFATE 1 MG/ML
INJECTION, SOLUTION INTRAVENOUS PRN
Status: DISCONTINUED | OUTPATIENT
Start: 2022-11-18 | End: 2022-11-18 | Stop reason: SDUPTHER

## 2022-11-18 RX ORDER — PROPOFOL 10 MG/ML
INJECTION, EMULSION INTRAVENOUS PRN
Status: DISCONTINUED | OUTPATIENT
Start: 2022-11-18 | End: 2022-11-18 | Stop reason: SDUPTHER

## 2022-11-18 RX ORDER — HYDROMORPHONE HYDROCHLORIDE 2 MG/ML
0.5 INJECTION, SOLUTION INTRAMUSCULAR; INTRAVENOUS; SUBCUTANEOUS EVERY 10 MIN PRN
Status: DISCONTINUED | OUTPATIENT
Start: 2022-11-18 | End: 2022-11-18 | Stop reason: HOSPADM

## 2022-11-18 RX ORDER — LIDOCAINE HYDROCHLORIDE 20 MG/ML
INJECTION, SOLUTION EPIDURAL; INFILTRATION; INTRACAUDAL; PERINEURAL PRN
Status: DISCONTINUED | OUTPATIENT
Start: 2022-11-18 | End: 2022-11-18 | Stop reason: SDUPTHER

## 2022-11-18 RX ORDER — SODIUM CHLORIDE 0.9 % (FLUSH) 0.9 %
5-40 SYRINGE (ML) INJECTION PRN
Status: DISCONTINUED | OUTPATIENT
Start: 2022-11-18 | End: 2022-11-18 | Stop reason: HOSPADM

## 2022-11-18 RX ORDER — PROCHLORPERAZINE EDISYLATE 5 MG/ML
5 INJECTION INTRAMUSCULAR; INTRAVENOUS
Status: DISCONTINUED | OUTPATIENT
Start: 2022-11-18 | End: 2022-11-18 | Stop reason: HOSPADM

## 2022-11-18 RX ORDER — MIDAZOLAM HYDROCHLORIDE 2 MG/2ML
2 INJECTION, SOLUTION INTRAMUSCULAR; INTRAVENOUS
Status: DISCONTINUED | OUTPATIENT
Start: 2022-11-18 | End: 2022-11-18 | Stop reason: HOSPADM

## 2022-11-18 RX ORDER — ROSUVASTATIN CALCIUM 20 MG/1
20 TABLET, COATED ORAL NIGHTLY
Status: DISCONTINUED | OUTPATIENT
Start: 2022-11-18 | End: 2022-11-20 | Stop reason: HOSPADM

## 2022-11-18 RX ORDER — LOSARTAN POTASSIUM 50 MG/1
25 TABLET ORAL
Status: DISCONTINUED | OUTPATIENT
Start: 2022-11-19 | End: 2022-11-20 | Stop reason: HOSPADM

## 2022-11-18 RX ORDER — FENTANYL CITRATE 50 UG/ML
100 INJECTION, SOLUTION INTRAMUSCULAR; INTRAVENOUS
Status: DISCONTINUED | OUTPATIENT
Start: 2022-11-18 | End: 2022-11-18 | Stop reason: HOSPADM

## 2022-11-18 RX ORDER — ATROPA BELLADONNA AND OPIUM 16.2; 6 MG/1; MG/1
60 SUPPOSITORY RECTAL EVERY 8 HOURS PRN
Status: DISCONTINUED | OUTPATIENT
Start: 2022-11-18 | End: 2022-11-18 | Stop reason: RX

## 2022-11-18 RX ORDER — LANOLIN ALCOHOL/MO/W.PET/CERES
400 CREAM (GRAM) TOPICAL DAILY
Status: DISCONTINUED | OUTPATIENT
Start: 2022-11-18 | End: 2022-11-20 | Stop reason: HOSPADM

## 2022-11-18 RX ORDER — ROCURONIUM BROMIDE 10 MG/ML
INJECTION, SOLUTION INTRAVENOUS PRN
Status: DISCONTINUED | OUTPATIENT
Start: 2022-11-18 | End: 2022-11-18 | Stop reason: SDUPTHER

## 2022-11-18 RX ORDER — ZOLPIDEM TARTRATE 5 MG/1
5 TABLET ORAL NIGHTLY PRN
Status: DISCONTINUED | OUTPATIENT
Start: 2022-11-18 | End: 2022-11-20 | Stop reason: HOSPADM

## 2022-11-18 RX ORDER — POTASSIUM CHLORIDE 1.5 G/1.77G
20 POWDER, FOR SOLUTION ORAL DAILY
Status: DISCONTINUED | OUTPATIENT
Start: 2022-11-18 | End: 2022-11-18 | Stop reason: RX

## 2022-11-18 RX ORDER — SODIUM CHLORIDE 9 MG/ML
INJECTION, SOLUTION INTRAVENOUS PRN
Status: DISCONTINUED | OUTPATIENT
Start: 2022-11-18 | End: 2022-11-18 | Stop reason: HOSPADM

## 2022-11-18 RX ORDER — FINASTERIDE 5 MG/1
5 TABLET, FILM COATED ORAL DAILY
Status: DISCONTINUED | OUTPATIENT
Start: 2022-11-19 | End: 2022-11-20 | Stop reason: HOSPADM

## 2022-11-18 RX ORDER — SODIUM CHLORIDE 450 MG/100ML
INJECTION, SOLUTION INTRAVENOUS CONTINUOUS
Status: DISCONTINUED | OUTPATIENT
Start: 2022-11-18 | End: 2022-11-19

## 2022-11-18 RX ORDER — DIPHENHYDRAMINE HYDROCHLORIDE 50 MG/ML
12.5 INJECTION INTRAMUSCULAR; INTRAVENOUS
Status: DISCONTINUED | OUTPATIENT
Start: 2022-11-18 | End: 2022-11-18 | Stop reason: HOSPADM

## 2022-11-18 RX ORDER — HYDROCODONE BITARTRATE AND ACETAMINOPHEN 10; 325 MG/1; MG/1
1 TABLET ORAL EVERY 6 HOURS PRN
Status: DISCONTINUED | OUTPATIENT
Start: 2022-11-18 | End: 2022-11-20 | Stop reason: HOSPADM

## 2022-11-18 RX ORDER — SODIUM CHLORIDE, SODIUM LACTATE, POTASSIUM CHLORIDE, CALCIUM CHLORIDE 600; 310; 30; 20 MG/100ML; MG/100ML; MG/100ML; MG/100ML
INJECTION, SOLUTION INTRAVENOUS CONTINUOUS
Status: DISCONTINUED | OUTPATIENT
Start: 2022-11-18 | End: 2022-11-19

## 2022-11-18 RX ORDER — METOPROLOL SUCCINATE 25 MG/1
25 TABLET, EXTENDED RELEASE ORAL DAILY
Status: DISCONTINUED | OUTPATIENT
Start: 2022-11-19 | End: 2022-11-20 | Stop reason: HOSPADM

## 2022-11-18 RX ORDER — ONDANSETRON 2 MG/ML
4 INJECTION INTRAMUSCULAR; INTRAVENOUS EVERY 6 HOURS PRN
Status: DISCONTINUED | OUTPATIENT
Start: 2022-11-18 | End: 2022-11-20 | Stop reason: HOSPADM

## 2022-11-18 RX ORDER — ONDANSETRON 2 MG/ML
INJECTION INTRAMUSCULAR; INTRAVENOUS PRN
Status: DISCONTINUED | OUTPATIENT
Start: 2022-11-18 | End: 2022-11-18 | Stop reason: SDUPTHER

## 2022-11-18 RX ORDER — LIDOCAINE HYDROCHLORIDE 10 MG/ML
1 INJECTION, SOLUTION INFILTRATION; PERINEURAL
Status: DISCONTINUED | OUTPATIENT
Start: 2022-11-18 | End: 2022-11-18 | Stop reason: HOSPADM

## 2022-11-18 RX ORDER — SODIUM CHLORIDE, SODIUM LACTATE, POTASSIUM CHLORIDE, CALCIUM CHLORIDE 600; 310; 30; 20 MG/100ML; MG/100ML; MG/100ML; MG/100ML
INJECTION, SOLUTION INTRAVENOUS CONTINUOUS
Status: DISCONTINUED | OUTPATIENT
Start: 2022-11-18 | End: 2022-11-18 | Stop reason: HOSPADM

## 2022-11-18 RX ORDER — GLIPIZIDE 5 MG/1
5 TABLET ORAL
Status: DISCONTINUED | OUTPATIENT
Start: 2022-11-18 | End: 2022-11-20 | Stop reason: HOSPADM

## 2022-11-18 RX ORDER — ACETAMINOPHEN 500 MG
1000 TABLET ORAL ONCE
Status: COMPLETED | OUTPATIENT
Start: 2022-11-18 | End: 2022-11-18

## 2022-11-18 RX ADMIN — ONDANSETRON 4 MG: 2 INJECTION INTRAMUSCULAR; INTRAVENOUS at 12:32

## 2022-11-18 RX ADMIN — SODIUM CHLORIDE: 450 INJECTION, SOLUTION INTRAVENOUS at 17:48

## 2022-11-18 RX ADMIN — PHENYLEPHRINE HYDROCHLORIDE 100 MCG: 10 INJECTION INTRAVENOUS at 12:39

## 2022-11-18 RX ADMIN — PHENYLEPHRINE HYDROCHLORIDE 100 MCG: 10 INJECTION INTRAVENOUS at 12:20

## 2022-11-18 RX ADMIN — Medication 4 MG: at 13:43

## 2022-11-18 RX ADMIN — SODIUM CHLORIDE, POTASSIUM CHLORIDE, SODIUM LACTATE AND CALCIUM CHLORIDE: 600; 310; 30; 20 INJECTION, SOLUTION INTRAVENOUS at 09:47

## 2022-11-18 RX ADMIN — ROCURONIUM BROMIDE 40 MG: 50 INJECTION, SOLUTION INTRAVENOUS at 12:20

## 2022-11-18 RX ADMIN — PHENYLEPHRINE HYDROCHLORIDE 100 MCG: 10 INJECTION INTRAVENOUS at 12:25

## 2022-11-18 RX ADMIN — Medication 10 MG: at 12:26

## 2022-11-18 RX ADMIN — PROPOFOL 50 MG: 10 INJECTION, EMULSION INTRAVENOUS at 13:17

## 2022-11-18 RX ADMIN — LIDOCAINE HYDROCHLORIDE 100 MG: 20 INJECTION, SOLUTION EPIDURAL; INFILTRATION; INTRACAUDAL; PERINEURAL at 12:20

## 2022-11-18 RX ADMIN — DEXAMETHASONE SODIUM PHOSPHATE 4 MG: 4 INJECTION, SOLUTION INTRAMUSCULAR; INTRAVENOUS at 12:32

## 2022-11-18 RX ADMIN — ACETAMINOPHEN 1000 MG: 500 TABLET ORAL at 09:46

## 2022-11-18 RX ADMIN — Medication 10 MG: at 12:24

## 2022-11-18 RX ADMIN — PHENYLEPHRINE HYDROCHLORIDE 100 MCG: 10 INJECTION INTRAVENOUS at 12:26

## 2022-11-18 RX ADMIN — HYDROCODONE BITARTRATE AND ACETAMINOPHEN 1 TABLET: 10; 325 TABLET ORAL at 17:47

## 2022-11-18 RX ADMIN — GLIPIZIDE 5 MG: 5 TABLET ORAL at 17:47

## 2022-11-18 RX ADMIN — PHENYLEPHRINE HYDROCHLORIDE 100 MCG: 10 INJECTION INTRAVENOUS at 12:24

## 2022-11-18 RX ADMIN — ROCURONIUM BROMIDE 5 MG: 50 INJECTION, SOLUTION INTRAVENOUS at 13:17

## 2022-11-18 RX ADMIN — FENTANYL CITRATE 100 MCG: 50 INJECTION INTRAMUSCULAR; INTRAVENOUS at 12:20

## 2022-11-18 RX ADMIN — PROPOFOL 200 MG: 10 INJECTION, EMULSION INTRAVENOUS at 12:20

## 2022-11-18 RX ADMIN — ROSUVASTATIN CALCIUM 20 MG: 20 TABLET, COATED ORAL at 20:57

## 2022-11-18 RX ADMIN — Medication 2000 MG: at 12:29

## 2022-11-18 RX ADMIN — INSULIN GLARGINE 10 UNITS: 100 INJECTION, SOLUTION SUBCUTANEOUS at 20:57

## 2022-11-18 RX ADMIN — GLYCOPYRROLATE 0.6 MG: 0.2 INJECTION, SOLUTION INTRAMUSCULAR; INTRAVENOUS at 13:43

## 2022-11-18 RX ADMIN — MAGNESIUM OXIDE TAB 400 MG (241.3 MG ELEMENTAL MG) 400 MG: 400 (241.3 MG) TAB at 17:47

## 2022-11-18 RX ADMIN — EZETIMIBE 10 MG: 10 TABLET ORAL at 17:47

## 2022-11-18 RX ADMIN — Medication 10 MG: at 12:25

## 2022-11-18 RX ADMIN — CEFAZOLIN 1000 MG: 1 INJECTION, POWDER, FOR SOLUTION INTRAMUSCULAR; INTRAVENOUS at 17:46

## 2022-11-18 ASSESSMENT — PAIN DESCRIPTION - LOCATION: LOCATION: GROIN

## 2022-11-18 ASSESSMENT — PAIN SCALES - GENERAL
PAINLEVEL_OUTOF10: 0
PAINLEVEL_OUTOF10: 0
PAINLEVEL_OUTOF10: 5

## 2022-11-18 ASSESSMENT — PAIN - FUNCTIONAL ASSESSMENT
PAIN_FUNCTIONAL_ASSESSMENT: ACTIVITIES ARE NOT PREVENTED
PAIN_FUNCTIONAL_ASSESSMENT: 0-10

## 2022-11-18 ASSESSMENT — PAIN DESCRIPTION - ORIENTATION: ORIENTATION: LOWER

## 2022-11-18 ASSESSMENT — PAIN DESCRIPTION - DESCRIPTORS: DESCRIPTORS: ACHING

## 2022-11-18 NOTE — PERIOP NOTE
Pt updated that Dr Rebeca Miller is delayed-still in the OR in the main. States he is comfortable and warm. No needs. Call bell in reach.

## 2022-11-18 NOTE — ANESTHESIA PRE PROCEDURE
Department of Anesthesiology  Preprocedure Note       Name:  Jony Vieyra   Age:  78 y.o.  :  1940                                          MRN:  [id number]         Date:  2022      Surgeon: Veronica Herman):  Subha Pretty MD    Procedure: Procedure(s):  CYSTOSCOPY TRANSURETHRAL RESECTION PROSTATE    Medications prior to admission:   Prior to Admission medications    Medication Sig Start Date End Date Taking? Authorizing Provider   potassium chloride (KLOR-CON) 20 MEQ packet Take 20 mEq by mouth daily    Historical Provider, MD   magnesium oxide (MAG-OX) 400 MG tablet Take 400 mg by mouth daily    Historical Provider, MD TABARES ULTRAFINE III SHORT PEN 31G X 8 MM MISC Inject 1 each into the skin daily 22   Lenn Peabody, APRN - NP   losartan (COZAAR) 25 MG tablet Take 25 mg by mouth Daily with lunch    Historical Provider, MD   tamsulosin (FLOMAX) 0.4 MG capsule Take 1 capsule by mouth daily  Patient taking differently: Take 0.4 mg by mouth Daily with lunch 22   Subha Pretty MD   finasteride (PROSCAR) 5 MG tablet Take 1 tablet by mouth daily  Patient taking differently: Take 5 mg by mouth Daily with lunch 22   Subha Pretty MD   metoprolol succinate (TOPROL XL) 25 MG extended release tablet Take 1 tablet by mouth in the morning.   Patient taking differently: Take 25 mg by mouth Daily with lunch 22   Lenn Peabody, APRN - NP   Ema Sill ULTRA strip Inject 1 each into the skin daily  22   Lenn Peabody, APRN - NP   B Complex Vitamins (B COMPLEX PO) Take 1 tablet by mouth daily    Historical Provider, MD   cyanocobalamin 1000 MCG tablet Take 1,000 mcg by mouth daily     Historical Provider, MD   Omega-3 Fatty Acids (FISH OIL OMEGA-3 PO) Take by mouth daily    Historical Provider, MD   Coenzyme Q10 (CO Q-10 PO) Take by mouth daily    Historical Provider, MD   empagliflozin (JARDIANCE) 25 MG tablet Take 1 tablet by mouth daily  Patient taking differently: Take 25 mg by mouth Daily with lunch 6/9/22   XI Juan NP   ezetimibe (ZETIA) 10 mg tablet Take 1 tablet by mouth daily  Patient taking differently: Take 10 mg by mouth at bedtime 6/9/22   XI Juan NP   glimepiride (AMARYL) 2 MG tablet Take 1 tablet by mouth daily 6/9/22   XI Juan NP   insulin glargine (LANTUS;BASAGLAR) 100 UNIT/ML injection pen Inject 10 Units into the skin daily 6/9/22   XI Juan NP   rosuvastatin (CRESTOR) 20 MG tablet Take 1 tablet by mouth daily  Patient taking differently: Take 20 mg by mouth at bedtime 6/9/22   XI Juan NP   BABY ASPIRIN PO Take by mouth Daily with lunch    Ar Automatic Reconciliation       Current medications:    Current Facility-Administered Medications   Medication Dose Route Frequency Provider Last Rate Last Admin    lidocaine 1 % injection 1 mL  1 mL IntraDERmal Once PRN Chanel Ravi MD        acetaminophen (TYLENOL) tablet 1,000 mg  1,000 mg Oral Once Chanel Ravi MD        fentaNYL (SUBLIMAZE) injection 100 mcg  100 mcg IntraVENous Once PRN Chanel Ravi MD        lactated ringers infusion   IntraVENous Continuous Chanel Ravi MD        sodium chloride flush 0.9 % injection 5-40 mL  5-40 mL IntraVENous 2 times per day Chanel Ravi MD        sodium chloride flush 0.9 % injection 5-40 mL  5-40 mL IntraVENous PRN Chanel Ravi MD        0.9 % sodium chloride infusion   IntraVENous PRN Chanel Ravi MD        midazolam PF (VERSED) injection 2 mg  2 mg IntraVENous Once PRN Chanel Ravi MD        ceFAZolin (ANCEF) 2000 mg in sterile water 20 mL IV syringe  2,000 mg IntraVENous On Call Venecia Majano MD           Allergies:  No Known Allergies    Problem List:    Patient Active Problem List   Diagnosis Code    Coronary artery disease involving native coronary artery of native heart without angina pectoris I25.10    Depressive disorder F32. A    Foraminal stenosis of lumbar region M48.061  Stage 3 chronic kidney disease (HCC) N18.30    Generalized ischemic myocardial dysfunction I25.5    Carotid stenosis, asymptomatic I65.29    History of coronary artery stent placement Z95.5    Legally blind H54.8    AICD (automatic cardioverter/defibrillator) present Z95.810    Diabetes mellitus (HCC) E11.9    Ischemic congestive cardiomyopathy (HCC) I25.5, I42.0    Chronic bilateral low back pain with right-sided sciatica M54.41, G89.29    Cobalamin deficiency E53.8    Hyperlipidemia E78.5    Benign prostatic hyperplasia without urinary obstruction N40.0    Bilateral carotid artery stenosis I65.23    Chronic renal disease, stage III ([facility] [address] [[id number]] N18.30    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 Mit[employer]; left of chest; per pt no shocks    Diabetes ([facility] [address]     type 2; oral and insulin reliant; AVG BS 120s; pt denies s.s. of hypoglycemia; last A1C 7.0 on 07/20/2022    Diverticulitis     s/p colectomy    Heart disease     High cholesterol     Histoplasmosis     right eye    History of AAA (abdominal aortic aneurysm) repair     History of abdominal aortic aneurysm (AAA)     History of MI (myocardial infarction)     x2    History of percutaneous coronary intervention     x2 heart stents    Ischemic cardiomyopathy     Followed by Dr. [facility]    Macular degeneration     legally blind    Stage 3 chronic kidney disease [facility])        Past Surgical History:        Procedure Laterality Date    ABDOMINAL AORTIC ANEURYSM REPAIR      CARDIAC CATHETERIZATION      x2 heart stents    CARDIAC DEFIBRILLATOR PLACEMENT Left     [facility] Mit[facility]    CATARACT REMOVAL      rigth eye    HX PARTIAL COLECTOMY      due to severe diverticulosis       Social History:    Social History     Tobacco Use    Smoking status: Former     Packs/day: 2.00     Types: Cigarettes     Quit date: 1989     Years since quittin.9    Smokeless tobacco: Never   Substance Use Topics    Alcohol use: Yes     Comment: occ                                Counseling given: Not Answered      Vital Signs (Current):   Vitals:    22 0844   BP: 131/60   Pulse: 69   Resp: 18   Temp: 97.7 °F (36.5 °C)   TempSrc: Oral   SpO2: 96%   Weight: 166 lb (75.3 kg)                                              BP Readings from Last 3 Encounters:   22 131/60   22 (!) 102/59   10/20/22 111/67       NPO Status: Time of last liquid consumption:                         Time of last solid consumption:                         Date of last liquid consumption: 22                        Date of last solid food consumption: 22    BMI:   Wt Readings from Last 3 Encounters:   22 166 lb (75.3 kg)   22 166 lb 4.8 oz (75.4 kg)   10/20/22 161 lb (73 kg)     Body mass index is 24.51 kg/m².     CBC:   Lab Results   Component Value Date/Time    WBC 3.7 10/04/2022 06:45 AM    RBC 3.99 10/04/2022 06:45 AM    HGB 12.7 2022 01:52 PM    HCT 38.8 10/04/2022 06:45 AM    MCV 97.2 10/04/2022 06:45 AM    RDW 12.8 10/04/2022 06:45 AM     10/04/2022 06:45 AM       CMP:   Lab Results   Component Value Date/Time     10/04/2022 06:45 AM    K 3.8 10/04/2022 06:45 AM     10/04/2022 06:45 AM    CO2 26 10/04/2022 06:45 AM    BUN 15 10/04/2022 06:45 AM    CREATININE 1.40 10/04/2022 06:45 AM    GFRAA >60 10/03/2022 03:25 AM    AGRATIO 1.8 03/02/2022 02:27 PM    LABGLOM 50 10/04/2022 06:45 AM    GLUCOSE 114 10/04/2022 06:45 AM    PROT 6.6 10/03/2022 03:25 AM    CALCIUM 8.9 10/04/2022 06:45 AM    BILITOT 0.6 10/03/2022 03:25 AM    ALKPHOS 63 10/03/2022 03:25 AM    ALKPHOS 92 03/02/2022 02:27 PM    AST 10 10/03/2022 03:25 AM    ALT 12 10/03/2022 03:25 AM       POC Tests: No results for input(s): POCGLU, POCNA, POCK, POCCL, POCBUN, POCHEMO, POCHCT in the last 72 hours. Coags:   Lab Results   Component Value Date/Time    PROTIME 15.3 10/02/2022 05:23 AM    INR 1.2 10/02/2022 05:23 AM    APTT 37.4 10/02/2022 05:23 AM       HCG (If Applicable): No results found for: PREGTESTUR, PREGSERUM, HCG, HCGQUANT     ABGs: No results found for: PHART, PO2ART, GZS9TPF, GME9GSH, BEART, A0MXSOII     Type & Screen (If Applicable):  No results found for: LABABO, LABRH    Drug/Infectious Status (If Applicable):  No results found for: HIV, HEPCAB    COVID-19 Screening (If Applicable):   Lab Results   Component Value Date/Time    COVID19 Not detected 09/08/2022 04:52 PM           Anesthesia Evaluation  Patient summary reviewed and Nursing notes reviewed no history of anesthetic complications:   Airway: Mallampati: II  TM distance: >3 FB   Neck ROM: full  Comment: Large tongue   Mouth opening: > = 3 FB   Dental: normal exam         Pulmonary: breath sounds clear to auscultation                            ROS comment: Former smoker but quit many yrs ago    Cardiovascular:  Exercise tolerance: good (>4 METS), Denied chest pain, SOB, syncope   (+) hypertension:, pacemaker (ICD ):, CAD:, CABG/stent (stents - remains on bASA and metoprolol ):, CHF (h/o ICM that resolved with medical mgmt):, hyperlipidemia          Rate: normal                 ROS comment: Echo 6/2022- normal LV fxn and no sign valvular abnormalities    Low risk nuclear stress 2022     Neuro/Psych:   (+) depression/anxiety              ROS comment: Neuropathy?  GI/Hepatic/Renal:   (+) renal disease: CRI, ROS comment: BPH    H/o partial colectomy . Endo/Other:    (+) DiabetesType II DM, , : arthritis:., .                 Abdominal:             Vascular:   + PVD, aortic or cerebral (AAA s/p repair and carotid stenosis <50% bilatearally ), . Other Findings:           Anesthesia Plan      general     ASA 3     (ETT)  Induction: intravenous. Anesthetic plan and risks discussed with patient.                         Bessie Meyer MD   11/18/2022

## 2022-11-18 NOTE — ANESTHESIA PROCEDURE NOTES
Airway  Date/Time: 11/18/2022 12:25 PM  Urgency: elective    Airway not difficult    General Information and Staff    Patient location during procedure: OR    Indications and Patient Condition  Indications for airway management: anesthesia and airway protection  Sedation level: deep  Preoxygenated: yes  Patient position: sniffing  MILS maintained throughout  Mask difficulty assessment: vent by bag mask    Final Airway Details  Final airway type: endotracheal airway      Successful airway: ETT  Cuffed: yes   Successful intubation technique: direct laryngoscopy  Endotracheal tube insertion site: oral  Blade: Wilian  Blade size: #4  ETT size (mm): 8.0  Cormack-Lehane Classification: grade I - full view of glottis  Placement verified by: chest auscultation and capnometry   Inital cuff pressure (cm H2O): 8  Measured from: lips  ETT to lips (cm): 21  Number of attempts at approach: 1  Ventilation between attempts: bag mask  Number of other approaches attempted: 0    Additional Comments  Lipsdentition as pre-op.

## 2022-11-18 NOTE — BRIEF OP NOTE
Brief Postoperative Note      Patient: Jordan Beck  YOB: 1940  MRN: [id number]    Date of Procedure: 11/18/2022    Pre-Op Diagnosis: Hyperplasia of prostate with lower urinary tract symptoms (LUTS) [N40.1]    Post-Op Diagnosis: Same       Procedure(s):  CYSTOSCOPY TRANSURETHRAL RESECTION PROSTATE    Surgeon(s):  Monica Bay MD    Anesthesia: General    Estimated Blood Loss (mL): less than 50     Complications: None    Specimens:   ID Type Source Tests Collected by Time Destination   A : Prostate Chips Tissue Prostate SURGICAL PATHOLOGY Monica Bay MD 11/18/2022 1341        Electronically signed by Tiana Eisenberg MD on 11/18/2022 at 1:49 PM

## 2022-11-18 NOTE — ANESTHESIA POSTPROCEDURE EVALUATION
Department of Anesthesiology  Postprocedure Note    Patient: Jose Eduardo George  MRN: [id number]  YOB: 1940  Date of evaluation: 11/18/2022      Procedure Summary     Date: 11/18/22 Room / Location: [facility] OP OR 04 / [facility]    Anesthesia Start: 1208 Anesthesia Stop: 0298    Procedure: CYSTOSCOPY TRANSURETHRAL RESECTION PROSTATE (Urethra) Diagnosis:       Hyperplasia of prostate with lower urinary tract symptoms (LUTS)      (Hyperplasia of prostate with lower urinary tract symptoms (LUTS) [N40.1])    Surgeons: Lisa Graves MD Responsible Provider: Rocky Giron MD    Anesthesia Type: general ASA Status: 3          Anesthesia Type: No value filed. Matt Phase I: Matt Score: 9    Matt Phase II:        Anesthesia Post Evaluation    Patient location during evaluation: PACU  Patient participation: complete - patient participated  Level of consciousness: awake and alert  Airway patency: patent  Nausea & Vomiting: no nausea  Cardiovascular status: hemodynamically stable  Respiratory status: acceptable  Hydration status: euvolemic  Comments: Bed ready now on floor. Prolonged PACU stay waiting for bed; he has been very stable.

## 2022-11-18 NOTE — H&P
HISTORY AND PHYSICAL             Date: 11/18/2022        Patient Name: eMre Aleman     YOB: 1940      Age:  78 y.o. History of Present Illness     The patient was recently admitted to the hospital with diverticulitis. I reviewed his CT today and I would estimate his prostate volume at 97 cm³. He has had an elevated postvoid residual.  This is in spite of being on Flomax and Proscar. The patient has significant lower urinary tract symptoms. Past Medical History     Past Medical History:   Diagnosis Date    Bilateral carotid artery stenosis     mild    BPH (benign prostatic hyperplasia)     CAD (coronary artery disease)     Cardiac defibrillator in place 2013    and pacemaker; [employer]; left of chest; per pt no shocks    Diabetes ([address]     type 2; oral and insulin reliant; AVG BS 120s; pt denies s.s. of hypoglycemia; last A1C 7.0 on 07/20/2022    Diverticulitis     s/p colectomy    Heart disease     High cholesterol     Histoplasmosis     right eye    History of AAA (abdominal aortic aneurysm) repair     History of abdominal aortic aneurysm (AAA)     History of MI (myocardial infarction)     x2    History of percutaneous coronary intervention     x2 heart stents    Ischemic cardiomyopathy     Followed by Dr. Sofía Deluca degeneration     legally blind    Stage 3 chronic kidney disease [facility])         Past Surgical History     Past Surgical History:   Procedure Laterality Date    ABDOMINAL AORTIC ANEURYSM REPAIR      CARDIAC CATHETERIZATION      x2 heart stents    CARDIAC DEFIBRILLATOR PLACEMENT Left 2013    [facility] Mitul    CATARACT REMOVAL      rigth eye    HX PARTIAL COLECTOMY      due to severe diverticulosis        Medications Prior to Admission     Prior to Admission medications    Medication Sig Start Date End Date Taking?  Authorizing Provider   potassium chloride (KLOR-CON) 20 MEQ packet Take 20 mEq by mouth daily    Historical Provider, MD   magnesium oxide (MAG-OX) 400 MG tablet Take 400 mg by mouth daily    Historical Provider, MD TABARES ULTRAFINE III SHORT PEN 31G X 8 MM MISC Inject 1 each into the skin daily 8/23/22   XI Benson NP   losartan (COZAAR) 25 MG tablet Take 25 mg by mouth Daily with lunch    Historical Provider, MD   tamsulosin (FLOMAX) 0.4 MG capsule Take 1 capsule by mouth daily  Patient taking differently: Take 0.4 mg by mouth Daily with lunch 9/12/22   Andreea Peres MD   finasteride (PROSCAR) 5 MG tablet Take 1 tablet by mouth daily  Patient taking differently: Take 5 mg by mouth Daily with lunch 9/12/22   Andreea Peres MD   metoprolol succinate (TOPROL XL) 25 MG extended release tablet Take 1 tablet by mouth in the morning.   Patient taking differently: Take 25 mg by mouth Daily with lunch 8/2/22   XI Benson NP   Janae Mitul ULTRA strip Inject 1 each into the skin daily  5/29/22   XI Benson NP   B Complex Vitamins (B COMPLEX PO) Take 1 tablet by mouth daily    Historical Provider, MD   cyanocobalamin 1000 MCG tablet Take 1,000 mcg by mouth daily     Historical Provider, MD   Omega-3 Fatty Acids (FISH OIL OMEGA-3 PO) Take by mouth daily    Historical Provider, MD   Coenzyme Q10 (CO Q-10 PO) Take by mouth daily    Historical Provider, MD   empagliflozin (JARDIANCE) 25 MG tablet Take 1 tablet by mouth daily  Patient taking differently: Take 25 mg by mouth Daily with lunch 6/9/22   XI Benson NP   ezetimibe (ZETIA) 10 mg tablet Take 1 tablet by mouth daily  Patient taking differently: Take 10 mg by mouth at bedtime 6/9/22   XI Benson NP   glimepiride (AMARYL) 2 MG tablet Take 1 tablet by mouth daily 6/9/22   XI Benson NP   insulin glargine (LANTUS;BASAGLAR) 100 UNIT/ML injection pen Inject 10 Units into the skin daily 6/9/22   XI Benson NP   rosuvastatin (CRESTOR) 20 MG tablet Take 1 tablet by mouth daily  Patient taking differently: Take 20 mg by mouth at bedtime 22   XI Hinojosa NP   BABY ASPIRIN PO Take by mouth Daily with lunch    Ar Automatic Reconciliation        Allergies   Patient has no known allergies. Social History     Social History     Socioeconomic History    Marital status:    Tobacco Use    Smoking status: Former     Packs/day: 2.00     Types: Cigarettes     Quit date: 1989     Years since quittin.9    Smokeless tobacco: Never   Vaping Use    Vaping Use: Never used   Substance and Sexual Activity    Alcohol use: Yes     Comment: occ    Drug use: Never   Social History Narrative     5  5 years ago just moved in  to an independent living facility here in [place] from [facility]. He has grown kids  Notrees Estimable daughter lives in [place] lives in [place]. Does not have a Health Care POA     Social Determinants of Health     Financial Resource Strain: Low Risk     Difficulty of Paying Living Expenses: Not hard at all   Food Insecurity: No Food Insecurity    Worried About Running Out of Food in the Last Year: Never true    Ran Out of Food in the Last Year: Never true          Family History     Family History   Problem Relation Age of Onset    Abdominal aortic aneurysm Brother     Cancer Mother         esopageal       Review of Systems   Review of Systems    Physical Exam   /60   Pulse 69   Temp 97.7 °F (36.5 °C) (Oral)   Resp 18   Wt 166 lb (75.3 kg)   SpO2 96%   BMI 24.51 kg/m²     Physical Exam    Regular rate and rhythm without murmur. Lungs are clear to auscultation. Abdomen is soft and nontender. There is no palpable mass. Labs    No results found for this or any previous visit (from the past 24 hour(s)). Imaging/Diagnostics Last 24 Hours   No results found.     Assessment      Hospital Problems             Last Modified POA    * (Principal) Hyperplasia of prostate with lower urinary tract symptoms (LUTS) 10/28/2022 Unknown    Overview Signed 10/28/2022  1:59 PM by Richard Gonzalez     Added automatically from request for surgery [id number]            Plan   We will proceed with transurethral resection of the prostate in the near future.     Consultations Ordered:  None    Electronically signed by Terence Scheuermann, MD on 5/24/22 at 8:14 AM EDT

## 2022-11-18 NOTE — PERIOP NOTE
Pt came by [facility] lives [place]-to be admitted today. Daughter Cece Barrow lives in [place]. Her number is [phone].

## 2022-11-18 NOTE — PROGRESS NOTES
TRANSFER - IN REPORT:    Verbal report received from Summer on Bishnu August  being received from PACU for routine progression of patient care      Report consisted of patient's Situation, Background, Assessment and   Recommendations(SBAR). Information from the following report(s) Nurse Handoff Report was reviewed with the receiving nurse. Opportunity for questions and clarification was provided. Assessment completed upon patient's arrival to unit and care assumed.

## 2022-11-18 NOTE — PROGRESS NOTES
Pharmacy called and notified me that B&A suppositories are on back stock and will not be added to patient's MAR.

## 2022-11-18 NOTE — PERIOP NOTE
TRANSFER - OUT REPORT:    Verbal report given to RAGHAV Melendez on Nicolette Davalos  being transferred to  for routine post-op       Report consisted of patient's Situation, Background, Assessment and   Recommendations(SBAR). Information from the following report(s) Nurse Handoff Report, Adult Overview, Surgery Report, Intake/Output, MAR, and Recent Results was reviewed with the receiving nurse. [place] Assessment: No data recorded  Lines:   Peripheral IV 11/18/22 Left Hand (Active)   Site Assessment Clean, dry & intact 11/18/22 1601   Line Status Infusing 11/18/22 1601   Phlebitis Assessment No symptoms 11/18/22 1601   Infiltration Assessment 0 11/18/22 1601   Alcohol Cap Used No 11/18/22 1601   Dressing Status Clean, dry & intact 11/18/22 1601   Dressing Type Transparent 11/18/22 1601        Opportunity for questions and clarification was provided.       Patient transported with:  O2 @ 3lpm  CBI

## 2022-11-19 LAB
ANION GAP SERPL CALC-SCNC: 5 MMOL/L (ref 2–11)
BUN SERPL-MCNC: 24 MG/DL (ref 8–23)
CALCIUM SERPL-MCNC: 8.3 MG/DL (ref 8.3–10.4)
CHLORIDE SERPL-SCNC: 108 MMOL/L (ref 101–110)
CO2 SERPL-SCNC: 24 MMOL/L (ref 21–32)
CREAT SERPL-MCNC: 1.3 MG/DL (ref 0.8–1.5)
ERYTHROCYTE [DISTWIDTH] IN BLOOD BY AUTOMATED COUNT: 13.2 % (ref 11.9–14.6)
GLUCOSE BLD STRIP.AUTO-MCNC: 208 MG/DL (ref 65–100)
GLUCOSE SERPL-MCNC: 160 MG/DL (ref 65–100)
HCT VFR BLD AUTO: 35.5 % (ref 41.1–50.3)
HGB BLD-MCNC: 11.7 G/DL (ref 13.6–17.2)
MCH RBC QN AUTO: 31.6 PG (ref 26.1–32.9)
MCHC RBC AUTO-ENTMCNC: 33 G/DL (ref 31.4–35)
MCV RBC AUTO: 95.9 FL (ref 82–102)
NRBC # BLD: 0 K/UL (ref 0–0.2)
PLATELET # BLD AUTO: 104 K/UL (ref 150–450)
PMV BLD AUTO: 11 FL (ref 9.4–12.3)
POTASSIUM SERPL-SCNC: 4.5 MMOL/L (ref 3.5–5.1)
RBC # BLD AUTO: 3.7 M/UL (ref 4.23–5.6)
SERVICE CMNT-IMP: ABNORMAL
SODIUM SERPL-SCNC: 137 MMOL/L (ref 133–143)
WBC # BLD AUTO: 9.6 K/UL (ref 4.3–11.1)

## 2022-11-19 PROCEDURE — 36415 COLL VENOUS BLD VENIPUNCTURE: CPT

## 2022-11-19 PROCEDURE — 2580000003 HC RX 258: Performed by: UROLOGY

## 2022-11-19 PROCEDURE — G0378 HOSPITAL OBSERVATION PER HR: HCPCS

## 2022-11-19 PROCEDURE — 6360000002 HC RX W HCPCS: Performed by: UROLOGY

## 2022-11-19 PROCEDURE — 99024 POSTOP FOLLOW-UP VISIT: CPT | Performed by: UROLOGY

## 2022-11-19 PROCEDURE — 85027 COMPLETE CBC AUTOMATED: CPT

## 2022-11-19 PROCEDURE — 6370000000 HC RX 637 (ALT 250 FOR IP): Performed by: UROLOGY

## 2022-11-19 PROCEDURE — 80048 BASIC METABOLIC PNL TOTAL CA: CPT

## 2022-11-19 PROCEDURE — 82962 GLUCOSE BLOOD TEST: CPT

## 2022-11-19 RX ADMIN — EZETIMIBE 10 MG: 10 TABLET ORAL at 09:50

## 2022-11-19 RX ADMIN — ROSUVASTATIN CALCIUM 20 MG: 20 TABLET, COATED ORAL at 21:40

## 2022-11-19 RX ADMIN — MAGNESIUM OXIDE TAB 400 MG (241.3 MG ELEMENTAL MG) 400 MG: 400 (241.3 MG) TAB at 09:50

## 2022-11-19 RX ADMIN — GLIPIZIDE 5 MG: 5 TABLET ORAL at 17:01

## 2022-11-19 RX ADMIN — HYDROCODONE BITARTRATE AND ACETAMINOPHEN 1 TABLET: 10; 325 TABLET ORAL at 09:17

## 2022-11-19 RX ADMIN — LOSARTAN POTASSIUM 25 MG: 50 TABLET, FILM COATED ORAL at 12:25

## 2022-11-19 RX ADMIN — CEFAZOLIN 1000 MG: 1 INJECTION, POWDER, FOR SOLUTION INTRAMUSCULAR; INTRAVENOUS at 17:00

## 2022-11-19 RX ADMIN — CEFAZOLIN 1000 MG: 1 INJECTION, POWDER, FOR SOLUTION INTRAMUSCULAR; INTRAVENOUS at 01:00

## 2022-11-19 RX ADMIN — CEFAZOLIN 1000 MG: 1 INJECTION, POWDER, FOR SOLUTION INTRAMUSCULAR; INTRAVENOUS at 09:51

## 2022-11-19 RX ADMIN — FINASTERIDE 5 MG: 5 TABLET, FILM COATED ORAL at 09:50

## 2022-11-19 RX ADMIN — INSULIN GLARGINE 10 UNITS: 100 INJECTION, SOLUTION SUBCUTANEOUS at 21:41

## 2022-11-19 RX ADMIN — METOPROLOL SUCCINATE 25 MG: 25 TABLET, FILM COATED, EXTENDED RELEASE ORAL at 09:50

## 2022-11-19 RX ADMIN — HYDROCODONE BITARTRATE AND ACETAMINOPHEN 1 TABLET: 10; 325 TABLET ORAL at 17:42

## 2022-11-19 RX ADMIN — GLIPIZIDE 5 MG: 5 TABLET ORAL at 09:50

## 2022-11-19 RX ADMIN — SODIUM CHLORIDE: 450 INJECTION, SOLUTION INTRAVENOUS at 03:37

## 2022-11-19 ASSESSMENT — PAIN DESCRIPTION - ORIENTATION: ORIENTATION: LOWER

## 2022-11-19 ASSESSMENT — PAIN DESCRIPTION - LOCATION: LOCATION: PENIS

## 2022-11-19 ASSESSMENT — PAIN DESCRIPTION - DESCRIPTORS: DESCRIPTORS: ACHING

## 2022-11-19 ASSESSMENT — PAIN SCALES - GENERAL
PAINLEVEL_OUTOF10: 0
PAINLEVEL_OUTOF10: 4
PAINLEVEL_OUTOF10: 2

## 2022-11-19 NOTE — PROGRESS NOTES
Hourly rounds completed this shift. All needs met at this time. Bed low/locked. Call light within reach. Will continue to monitor and give bedside report to oncoming nurse. CBI infusing at mod gtt. Urine pink/clear.

## 2022-11-19 NOTE — PROGRESS NOTES
Urology Progress Note    Admit Date: 11/18/2022    Subjective:     Patient has no new complaints. Pain controlled. Urine clear pink on moderate CBI     Objective:     Patient Vitals for the past 8 hrs:   BP Temp Temp src Pulse Resp SpO2   11/19/22 0706 (!) 102/56 97.5 °F (36.4 °C) -- 62 17 93 %   11/19/22 0315 (!) 116/59 97.7 °F (36.5 °C) Oral 70 18 96 %     No intake/output data recorded. 11/17 1901 - 11/19 0700  In: 10069 [P.O.:240; I.V.:1000]  Out: 48020 [Urine:79749]    Physical Exam:   BP (!) 102/56   Pulse 62   Temp 97.5 °F (36.4 °C)   Resp 17   Wt 166 lb (75.3 kg)   SpO2 93%   BMI 24.51 kg/m²      GENERAL: No acute distress, Awake, Alert, Oriented X 3, Gait normal  CARDIAC: regular rate and rhythm  CHEST AND LUNG: Easy work of breathing, clear to auscultation bilaterally, no cyanosis  ABDOMEN: soft, non tender, non-distended, positive bowel sounds, no organomegaly, no palpable masses, no guarding, no rebound tenderness  SKIN: No rash, no erythema, no lacerations or abrasions, no ecchymosis  NEUROLOGIC: cranial nerves 2-12 grossly intact   : Valiente clear pink on moderate CBI        Data Review   Recent Results (from the past 24 hour(s))   TYPE AND SCREEN    Collection Time: 11/18/22  9:11 AM   Result Value Ref Range    Crossmatch expiration date 11/21/2022,2359     ABO/Rh O NEGATIVE     Antibody Screen NEG    POCT Glucose    Collection Time: 11/18/22  9:37 AM   Result Value Ref Range    POC Glucose 108 (H) 65 - 100 mg/dL    Performed by:  Wanda    POCT Glucose    Collection Time: 11/18/22  4:31 PM   Result Value Ref Range    POC Glucose 127 (H) 65 - 100 mg/dL    Performed by: Deepa    POCT Glucose    Collection Time: 11/18/22  8:50 PM   Result Value Ref Range    POC Glucose 317 (H) 65 - 100 mg/dL    Performed by: Kevin    CBC    Collection Time: 11/19/22  6:02 AM   Result Value Ref Range    WBC 9.6 4.3 - 11.1 K/uL    RBC 3.70 (L) 4.23 - 5.6 M/uL    Hemoglobin 11.7 (L) 13.6 - 17.2 g/dL    Hematocrit 35.5 (L) 41.1 - 50.3 %    MCV 95.9 82 - 102 FL    MCH 31.6 26.1 - 32.9 PG    MCHC 33.0 31.4 - 35.0 g/dL    RDW 13.2 11.9 - 14.6 %    Platelets 801 (L) 773 - 450 K/uL    MPV 11.0 9.4 - 12.3 FL    nRBC 0.00 0.0 - 0.2 K/uL   Basic Metabolic Panel    Collection Time: 11/19/22  6:02 AM   Result Value Ref Range    Sodium 137 133 - 143 mmol/L    Potassium 4.5 3.5 - 5.1 mmol/L    Chloride 108 101 - 110 mmol/L    CO2 24 21 - 32 mmol/L    Anion Gap 5 2 - 11 mmol/L    Glucose 160 (H) 65 - 100 mg/dL    BUN 24 (H) 8 - 23 MG/DL    Creatinine 1.30 0.8 - 1.5 MG/DL    Est, Glom Filt Rate 54 (L) >60 ml/min/1.73m2    Calcium 8.3 8.3 - 10.4 MG/DL           Assessment:     Principal Problem:    Hyperplasia of prostate with lower urinary tract symptoms (LUTS)  Active Problems:    S/P TURP  Resolved Problems:    * No resolved hospital problems. *    POD 1 s/p TURP. Doing well    Plan:     -Regular diet  -SLIV  -Wean CBI  -PO pain control  -Trend labs  -Dispo: D/C home tomorrow if able to wean off CBI      Imer العلي Mai, M.D.     [facility]  [facility] [address]  [place], [address]  Phone: [phone]  Fax: [phone]

## 2022-11-19 NOTE — FLOWSHEET NOTE
11/19/22 1132   Urinary Catheter 11/18/22 2 Way; Valiente   Placement Date/Time: 11/18/22 1340   Present on Admission/Arrival: No  Inserted by: dr. Tabatha Becker  Insertion attempts: 1  Catheter Type: 2 Way; Valiente  Catheter Size: 24 FR  Catheter Balloon Size: 30 mL  Insertion Procedure Practices: Insertion date plac. .. Urine Color Cherry   Urine Appearance Clear   Collection Container Standard   Securement Method Securing device (Describe)   Catheter Best Practices  Drainage tube clipped to bed; Bag below bladder;Bag not on floor; Lack of dependent loop in tubing   Status Draining   Attempted to wean patient off CBI, urine red in color. Re-initiated moderate flow.

## 2022-11-19 NOTE — OP NOTE
[facility]  OPERATIVE REPORT    Name:  Keila Ferro  MR#:  [id number]  :  1940  ACCOUNT #:  [de-identified]  DATE OF SERVICE:  2022      PREOPERATIVE DIAGNOSIS:  Benign prostatic hypertrophy with incomplete urinary retention. POSTOPERATIVE DIAGNOSIS:  Benign prostatic hypertrophy with incomplete urinary retention. PROCEDURE PERFORMED:  Transurethral resection of prostate. SURGEON:  Monica Bay MD    ASSISTANT: None    ANESTHESIA:  General.    ESTIMATED BLOOD LOSS:  Less than 50 mL. DRAINS:  A 24-[place] citizen three-way Valiente catheter to continuous bladder irrigation with 30 mL of sterile water in the balloon. COMPLICATIONS:  None. SPECIMEN REMOVED:  Multiple prostate chips for permanent path. NARRATIVE:  The patient was taken to the OR and after adequate general anesthesia was achieved, he was placed in dorsal lithotomy position and prepped and draped in usual sterile fashion for a cystoscopy case. The urethra was gently sounded with Loise Ace sounds from 16-[place] citizen up to 28-V[place] citizen. A 26-[place] citizen resectoscope sheath with obturator was advanced through the urethra and into the bladder. The obturator was removed with clear return of urine. Charisse Garber working element with standard loop was inserted and the bladder was inspected. There were no mucosal lesions. Both ureteral orifices of normal size, shape, and position. There was mild trabeculation. The patient did have significant BPH and I pulled the scope back into the prostatic fossa and identified the verumontanum. Transurethral resection of prostate was then performed first from 12 to 9 o'clock and then 12 to 3 o'clock. Posterior aspect of the prostate was then resected. There was some adenoma distally that had to be resected as well. All of these prostate chips were irrigated out. Bovie electrocautery was used to maintain hemostasis.   Both ureteral orifices were positively identified at the end of the case.  All instruments were removed. Once all the prostate chips had been irrigated out, a 24-[place]ander three-way Valiente catheter was advanced through the urethra and into the bladder. It was then inflated with 30 mL of sterile water. I irrigated this with a catheter tip syringe with very clear return and was left to continuous bladder irrigation. The patient was taken down out of dorsal lithotomy position, awakened, extubated and taken from the OR without any further incident or complaint.         Izabella Zhu MD      [id number]  D:  11/18/2022 14:14  T:  11/18/2022 19:12  JOB #:  [id number]

## 2022-11-19 NOTE — PLAN OF CARE
Problem: Chronic Conditions and Co-morbidities  Goal: Patient's chronic conditions and co-morbidity symptoms are monitored and maintained or improved  Outcome: Progressing     Problem: Pain  Goal: Verbalizes/displays adequate comfort level or baseline comfort level  Outcome: Progressing     Problem: Discharge Planning  Goal: Discharge to home or other facility with appropriate resources  Outcome: Progressing     Problem: ABCDS Injury Assessment  Goal: Absence of physical injury  Outcome: Progressing

## 2022-11-20 VITALS
BODY MASS INDEX: 24.59 KG/M2 | HEIGHT: 69 IN | TEMPERATURE: 97.9 F | DIASTOLIC BLOOD PRESSURE: 60 MMHG | WEIGHT: 166 LBS | SYSTOLIC BLOOD PRESSURE: 128 MMHG | RESPIRATION RATE: 16 BRPM | HEART RATE: 76 BPM | OXYGEN SATURATION: 94 %

## 2022-11-20 LAB
ANION GAP SERPL CALC-SCNC: 3 MMOL/L (ref 2–11)
BUN SERPL-MCNC: 25 MG/DL (ref 8–23)
CALCIUM SERPL-MCNC: 8.8 MG/DL (ref 8.3–10.4)
CHLORIDE SERPL-SCNC: 110 MMOL/L (ref 101–110)
CO2 SERPL-SCNC: 26 MMOL/L (ref 21–32)
CREAT SERPL-MCNC: 1.2 MG/DL (ref 0.8–1.5)
ERYTHROCYTE [DISTWIDTH] IN BLOOD BY AUTOMATED COUNT: 13.7 % (ref 11.9–14.6)
GLUCOSE SERPL-MCNC: 127 MG/DL (ref 65–100)
HCT VFR BLD AUTO: 34.7 % (ref 41.1–50.3)
HGB BLD-MCNC: 11.5 G/DL (ref 13.6–17.2)
MCH RBC QN AUTO: 32.2 PG (ref 26.1–32.9)
MCHC RBC AUTO-ENTMCNC: 33.1 G/DL (ref 31.4–35)
MCV RBC AUTO: 97.2 FL (ref 82–102)
NRBC # BLD: 0 K/UL (ref 0–0.2)
PLATELET # BLD AUTO: 91 K/UL (ref 150–450)
PMV BLD AUTO: 11.3 FL (ref 9.4–12.3)
POTASSIUM SERPL-SCNC: 3.9 MMOL/L (ref 3.5–5.1)
RBC # BLD AUTO: 3.57 M/UL (ref 4.23–5.6)
SODIUM SERPL-SCNC: 139 MMOL/L (ref 133–143)
WBC # BLD AUTO: 7.9 K/UL (ref 4.3–11.1)

## 2022-11-20 PROCEDURE — 80048 BASIC METABOLIC PNL TOTAL CA: CPT

## 2022-11-20 PROCEDURE — 6360000002 HC RX W HCPCS: Performed by: UROLOGY

## 2022-11-20 PROCEDURE — 2580000003 HC RX 258: Performed by: UROLOGY

## 2022-11-20 PROCEDURE — 51798 US URINE CAPACITY MEASURE: CPT

## 2022-11-20 PROCEDURE — 85027 COMPLETE CBC AUTOMATED: CPT

## 2022-11-20 PROCEDURE — G0378 HOSPITAL OBSERVATION PER HR: HCPCS

## 2022-11-20 PROCEDURE — 6370000000 HC RX 637 (ALT 250 FOR IP): Performed by: UROLOGY

## 2022-11-20 PROCEDURE — 99024 POSTOP FOLLOW-UP VISIT: CPT | Performed by: UROLOGY

## 2022-11-20 PROCEDURE — 99238 HOSP IP/OBS DSCHRG MGMT 30/<: CPT | Performed by: UROLOGY

## 2022-11-20 PROCEDURE — 36415 COLL VENOUS BLD VENIPUNCTURE: CPT

## 2022-11-20 RX ORDER — CEPHALEXIN 500 MG/1
500 CAPSULE ORAL 2 TIMES DAILY
Qty: 10 CAPSULE | Refills: 0 | Status: SHIPPED | OUTPATIENT
Start: 2022-11-20 | End: 2022-11-25

## 2022-11-20 RX ORDER — HYOSCYAMINE SULFATE 0.12 MG/1
0.12 TABLET SUBLINGUAL EVERY 4 HOURS PRN
Qty: 30 EACH | Refills: 1 | Status: SHIPPED | OUTPATIENT
Start: 2022-11-20

## 2022-11-20 RX ORDER — HYDROCODONE BITARTRATE AND ACETAMINOPHEN 5; 325 MG/1; MG/1
1 TABLET ORAL EVERY 4 HOURS PRN
Qty: 18 TABLET | Refills: 0 | Status: SHIPPED | OUTPATIENT
Start: 2022-11-20 | End: 2022-11-23

## 2022-11-20 RX ADMIN — CEFAZOLIN 1000 MG: 1 INJECTION, POWDER, FOR SOLUTION INTRAMUSCULAR; INTRAVENOUS at 00:56

## 2022-11-20 RX ADMIN — LOSARTAN POTASSIUM 25 MG: 50 TABLET, FILM COATED ORAL at 11:12

## 2022-11-20 RX ADMIN — CEFAZOLIN 1000 MG: 1 INJECTION, POWDER, FOR SOLUTION INTRAMUSCULAR; INTRAVENOUS at 09:28

## 2022-11-20 RX ADMIN — MAGNESIUM OXIDE TAB 400 MG (241.3 MG ELEMENTAL MG) 400 MG: 400 (241.3 MG) TAB at 09:27

## 2022-11-20 RX ADMIN — HYDROCODONE BITARTRATE AND ACETAMINOPHEN 1 TABLET: 10; 325 TABLET ORAL at 11:12

## 2022-11-20 RX ADMIN — METOPROLOL SUCCINATE 25 MG: 25 TABLET, FILM COATED, EXTENDED RELEASE ORAL at 09:28

## 2022-11-20 RX ADMIN — GLIPIZIDE 5 MG: 5 TABLET ORAL at 06:01

## 2022-11-20 RX ADMIN — EZETIMIBE 10 MG: 10 TABLET ORAL at 09:27

## 2022-11-20 RX ADMIN — FINASTERIDE 5 MG: 5 TABLET, FILM COATED ORAL at 09:27

## 2022-11-20 ASSESSMENT — PAIN DESCRIPTION - LOCATION: LOCATION: HEAD

## 2022-11-20 ASSESSMENT — PAIN SCALES - GENERAL
PAINLEVEL_OUTOF10: 1
PAINLEVEL_OUTOF10: 0

## 2022-11-20 ASSESSMENT — PAIN DESCRIPTION - ORIENTATION: ORIENTATION: ANTERIOR;RIGHT;LEFT

## 2022-11-20 ASSESSMENT — PAIN SCALES - WONG BAKER: WONGBAKER_NUMERICALRESPONSE: 0

## 2022-11-20 ASSESSMENT — PAIN DESCRIPTION - DESCRIPTORS: DESCRIPTORS: ACHING;DISCOMFORT

## 2022-11-20 NOTE — CARE COORDINATION
CM spoke with patient to complete initial assessment. Patient states that he lives alone in a one story apartment with 0STE. Patient is independent with ADL's, does not drive. He states he has a  that transports him everywhere. No DME's. Patient has friends that live nearby and assist PRN. Demographics, insurance and PCP confirmed. No hx of HH or STR. Patient is medically stable for discharge today. Patient confirmed that he does not need anything after discharge - including therapy or HH. Patient will be getting transported home by his . No needs have been voiced/identified at this time. 11/20/22 8507   Service Assessment   Patient Orientation Alert and Oriented   Cognition Alert   History Provided By Patient   Primary Caregiver Self   Support Systems Friends/Neighbors   PCP Verified by CM Yes   Prior Functional Level Independent in ADLs/IADLs   Current Functional Level Independent in ADLs/IADLs   Can patient return to prior living arrangement Yes   Ability to make needs known: Good   Social/Functional History   Lives With Alone   Type of [address] One [address]   Receives Help From Friend(s)   ADL Assistance Independent   Discharge Planning   Type of Residence Apartment   Living Arrangements Alone   Current Services Prior To Admission None   Potential Assistance Needed N/A   DME Ordered?  No   Potential Assistance Purchasing Medications No   Type of Home Care Services None   Patient expects to be discharged to: Apartment   One/Two Story Residence One story   [facility] [place] [facility] Discharge   Services At/After Discharge None   Confirm Follow Up Transport Cab

## 2022-11-20 NOTE — PROGRESS NOTES
Valiente removed at 0700 by night nurse. Patient has not voided urine; pt assisted up to recliner to sit up and attempt to urinate. PO fluids encouraged.

## 2022-11-20 NOTE — PLAN OF CARE
Problem: Chronic Conditions and Co-morbidities  Goal: Patient's chronic conditions and co-morbidity symptoms are monitored and maintained or improved  Outcome: Completed     Problem: Pain  Goal: Verbalizes/displays adequate comfort level or baseline comfort level  Outcome: Completed     Problem: Discharge Planning  Goal: Discharge to home or other facility with appropriate resources  Outcome: Completed  Flowsheets (Taken 11/20/2022 8933)  Discharge to home or other facility with appropriate resources: Identify barriers to discharge with patient and caregiver     Problem: ABCDS Injury Assessment  Goal: Absence of physical injury  Outcome: Completed

## 2022-11-20 NOTE — DISCHARGE INSTRUCTIONS
Please call our office if you have fever > 100.5, inability to urinate, severe pain or severe bleeding. Some blood in urine is normal for 1-2 weeks after surgery and will come/go. As long as you are able to go to the restroom, this is fine. Please no heavy lifting or strenuous activity for 4 weeks after procedure.

## 2022-11-20 NOTE — PROGRESS NOTES
Urology Progress Note    Admit Date: 11/18/2022    Subjective:     Patient has no new complaints. Pain controlled. Urine clear pink on NO CBI    Objective:     Patient Vitals for the past 8 hrs:   BP Temp Temp src Pulse Resp SpO2   11/20/22 0705 131/66 97.9 °F (36.6 °C) Oral 77 17 94 %   11/20/22 0446 107/61 98.8 °F (37.1 °C) Oral 70 18 95 %       No intake/output data recorded.   11/18 1901 - 11/20 0700  In: 24771 [P.O.:240]  Out: 31661 [Urine:27590]    Physical Exam:   /66   Pulse 77   Temp 97.9 °F (36.6 °C) (Oral)   Resp 17   Ht 5' 9\" (1.753 m)   Wt 166 lb (75.3 kg)   SpO2 94%   BMI 24.51 kg/m²      GENERAL: No acute distress, Awake, Alert, Oriented X 3, Gait normal  CARDIAC: regular rate and rhythm  CHEST AND LUNG: Easy work of breathing, clear to auscultation bilaterally, no cyanosis  ABDOMEN: soft, non tender, non-distended, positive bowel sounds, no organomegaly, no palpable masses, no guarding, no rebound tenderness  SKIN: No rash, no erythema, no lacerations or abrasions, no ecchymosis  NEUROLOGIC: cranial nerves 2-12 grossly intact   : Valiente clear pink on NO CBI        Data Review   Recent Results (from the past 24 hour(s))   POCT Glucose    Collection Time: 11/19/22  8:56 PM   Result Value Ref Range    POC Glucose 208 (H) 65 - 100 mg/dL    Performed by: Jared Clark    CBC    Collection Time: 11/20/22  5:52 AM   Result Value Ref Range    WBC 7.9 4.3 - 11.1 K/uL    RBC 3.57 (L) 4.23 - 5.6 M/uL    Hemoglobin 11.5 (L) 13.6 - 17.2 g/dL    Hematocrit 34.7 (L) 41.1 - 50.3 %    MCV 97.2 82 - 102 FL    MCH 32.2 26.1 - 32.9 PG    MCHC 33.1 31.4 - 35.0 g/dL    RDW 13.7 11.9 - 14.6 %    Platelets 91 (L) 743 - 450 K/uL    MPV 11.3 9.4 - 12.3 FL    nRBC 0.00 0.0 - 0.2 K/uL   Basic Metabolic Panel    Collection Time: 11/20/22  5:52 AM   Result Value Ref Range    Sodium 139 133 - 143 mmol/L    Potassium 3.9 3.5 - 5.1 mmol/L    Chloride 110 101 - 110 mmol/L    CO2 26 21 - 32 mmol/L    Anion Gap 3 2 - 11

## 2022-11-20 NOTE — PROGRESS NOTES
[facility]. AVS summary reviewed with patient and opportunity for questions and answers given. Patient to  prescriptions from Pharmacy. Left hand PIV removed with catheter intact, site without signs/symptoms of infection, dressing applied.

## 2022-11-20 NOTE — DISCHARGE SUMMARY
Physician Discharge Summary    Name: Danish Maurer  Medical Record Number: [id number]       Account Number:  [de-identified]  YOB: 1940                         Age:  78 y.o. Admit date:  11/18/2022                    Discharge date:  11/20/2022    Attending Physician: Denise           Service: Urology    Physician Summary completed by: Lorena Buck. Angelo Merritt MD    Reason for hospitalization: BPH    Significant PMH:   Past Medical History:   Diagnosis Date    Bilateral carotid artery stenosis     mild    BPH (benign prostatic hyperplasia)     CAD (coronary artery disease)     Cardiac defibrillator in place 2013    and pacemaker; [employer] [place]; left of chest; per pt no shocks    Diabetes ([facility] [address]     type 2; oral and insulin reliant; AVG BS 120s; pt denies s.s. of hypoglycemia; last A1C 7.0 on 07/20/2022    Diverticulitis     s/p colectomy    Heart disease     High cholesterol     Histoplasmosis     right eye    History of AAA (abdominal aortic aneurysm) repair     History of abdominal aortic aneurysm (AAA)     History of MI (myocardial infarction)     x2    History of percutaneous coronary intervention     x2 heart stents    Ischemic cardiomyopathy     Followed by Dr. [facility]    Legally blind     Macular degeneration     legally blind    Stage 3 chronic kidney disease ([facility] [address]        Allergies:   No Known Allergies    Brief Hospital Course: The patient was admitted and had the procedure listed below performed without difficulty. His hospital course progressed as expected. No acute events occurred throughout his hospital stay. CBI weaned. Valiente removed. He will have to void in order to discharge home. He was discharged in stable condition. Follow up 2 weeks for symptom check.     Admission Physical Exam notable for:    BPH    Admission Lab/Radiology studies notable for:   None    Surgical Procedures:  Cystoscopy, Bipolar Trans-urethral Resection of Prostate    Condition at Discharge: Stable    Discharge Diagnoses:     Hyperplasia of prostate with lower urinary tract symptoms (LUTS) [N40.1]  S/P TURP [Z90.79]    Significant Diagnostic Studies and Procedures:  Noted in brief hospital course. Consults:  None    Patient Disposition: home       Patient instructions/medications:    Current Facility-Administered Medications:     ezetimibe (ZETIA) tablet 10 mg, 10 mg, Oral, Daily, Kell Ritchie MD, 10 mg at 11/19/22 0950    finasteride (PROSCAR) tablet 5 mg, 5 mg, Oral, Daily, Kell Ritchie MD, 5 mg at 11/19/22 0950    glipiZIDE (GLUCOTROL) tablet 5 mg, 5 mg, Oral, BID AC, Kell Ritchie MD, 5 mg at 11/20/22 0601    losartan (COZAAR) tablet 25 mg, 25 mg, Oral, Lunch, Kell Ritchie MD, 25 mg at 11/19/22 1225    magnesium oxide (MAG-OX) tablet 400 mg, 400 mg, Oral, Daily, Kell Ritchie MD, 400 mg at 11/19/22 0950    metoprolol succinate (TOPROL XL) extended release tablet 25 mg, 25 mg, Oral, Daily, Kell Ritchie MD, 25 mg at 11/19/22 0950    rosuvastatin (CRESTOR) tablet 20 mg, 20 mg, Oral, Nightly, Kell Ritchie MD, 20 mg at 11/19/22 2140    ceFAZolin (ANCEF) 1,000 mg in sodium chloride 0.9 % 50 mL IVPB (mini-bag), 1,000 mg, IntraVENous, Q8H, Kell Ritchie MD, Stopped at 11/20/22 0253    HYDROcodone-acetaminophen ([facility])  MG per tablet 1 tablet, 1 tablet, Oral, Q6H PRN, Kell Ritchie MD, 1 tablet at 11/19/22 1742    HYDROmorphone (DILAUDID) injection 0.5 mg, 0.5 mg, IntraVENous, Q3H PRN, Kell Ritchie MD    ondansetron [facility]) injection 4 mg, 4 mg, IntraVENous, Q6H PRN, Kell Ritchie MD    zolpidem (AMBIEN) tablet 5 mg, 5 mg, Oral, Nightly PRN, Kell Ritchie MD    insulin glargine (LANTUS) injection vial 10 Units, 10 Units, SubCUTAneous, Nightly, Kell Ritchie MD, 10 Units at 11/19/22 2141    Pending items needing follow up: Ella Katharine was instructed to follow up as indicated above. Signed:  Kat Crouch.  Bhavesh, ME[place], [address]  Phone: [phone]  Fax: [phone]    cc:  Primary Care Physician:  Verified  Referring physicians:     Additional provider(s):

## 2022-11-22 ENCOUNTER — CARE COORDINATION (OUTPATIENT)
Dept: CARE COORDINATION | Facility: CLINIC | Age: 82
End: 2022-11-22

## 2022-11-22 NOTE — CARE COORDINATION
Ambulatory Care Coordination Note  11/22/2022    ACC: Rebekah Tariq RN    Ccm outreached to patient. Patient states he is doing well at this time. Patient states no questions or concerns. Ccm discussed that I would outreach next week. Patient agreeable. Offered patient enrollment in the Remote Patient Monitoring (RPM) program for in-home monitoring: NA. Lab Results       None            Care Coordination Interventions    Referral from Primary Care Provider: No  Suggested Interventions and Community Resources          Goals Addressed                   This Visit's Progress     Conditions and Symptoms   On track     I will schedule office visits, as directed by my provider. I will keep my appointment or reschedule if I have to cancel. I will notify my provider of any barriers to my plan of care. I will notify my provider of any symptoms that indicate a worsening of my condition. Barriers: none  Plan for overcoming my barriers: N/A  Confidence: 9/10  Anticipated Goal Completion Date: 11/9/22         Medication Management   On track     I will take my medication as directed. I will notify my provider of any problems with medications, like adverse effects or side effects. I will notify my provider/Care Coordinator if I am unable to afford my medications. I will notify my provider for advice before I stop taking any of my medication. Barriers: none  Plan for overcoming my barriers: N/A  Confidence: 9/10  Anticipated Goal Completion Date: 11/9/22              Prior to Admission medications    Medication Sig Start Date End Date Taking? Authorizing Provider   HYDROcodone-acetaminophen (NORCO) 5-325 MG per tablet Take 1 tablet by mouth every 4 hours as needed for Pain for up to 3 days. Intended supply: 3 days.  Take lowest dose possible to manage pain 11/20/22 11/23/22  Oz Danielle MD   cephALEXin [facility]) 500 MG capsule Take 1 capsule by mouth 2 times daily for 5 days 11/20/22 11/25/22  Angel Plants Flavio Jorge MD   Hyoscyamine Sulfate SL (LEVSIN/SL) 0.125 MG SUBL Place 0.125 mg under the tongue every 4 hours as needed (bladder spasms) 11/20/22   Artemio Larsen MD   potassium chloride (KLOR-CON) 20 MEQ packet Take 20 mEq by mouth daily    Historical Provider, MD   magnesium oxide (MAG-OX) 400 MG tablet Take 400 mg by mouth daily    Historical Provider, MD TABARES ULTRAFINE III SHORT PEN 31G X 8 MM MISC Inject 1 each into the skin daily 8/23/22   XI Juan NP   losartan (COZAAR) 25 MG tablet Take 25 mg by mouth Daily with lunch    Historical Provider, MD   tamsulosin (FLOMAX) 0.4 MG capsule Take 1 capsule by mouth daily  Patient taking differently: Take 0.4 mg by mouth Daily with lunch 9/12/22   Venecia Majano MD   finasteride (PROSCAR) 5 MG tablet Take 1 tablet by mouth daily  Patient taking differently: Take 5 mg by mouth Daily with lunch 9/12/22   Venecia Majano MD   metoprolol succinate (TOPROL XL) 25 MG extended release tablet Take 1 tablet by mouth in the morning.   Patient taking differently: Take 25 mg by mouth Daily with lunch 8/2/22   XI Juan NP   Remus Janice ULTRA strip Inject 1 each into the skin daily  5/29/22   XI Juan NP   B Complex Vitamins (B COMPLEX PO) Take 1 tablet by mouth daily    Historical Provider, MD   cyanocobalamin 1000 MCG tablet Take 1,000 mcg by mouth daily     Historical Provider, MD   Omega-3 Fatty Acids (FISH OIL OMEGA-3 PO) Take by mouth daily    Historical Provider, MD   Coenzyme Q10 (CO Q-10 PO) Take by mouth daily    Historical Provider, MD   empagliflozin (JARDIANCE) 25 MG tablet Take 1 tablet by mouth daily  Patient taking differently: Take 25 mg by mouth Daily with lunch 6/9/22   XI Juan NP   ezetimibe (ZETIA) 10 mg tablet Take 1 tablet by mouth daily  Patient taking differently: Take 10 mg by mouth at bedtime 6/9/22   XI Juan NP   glimepiride (AMARYL) 2 MG tablet Take 1 tablet by mouth daily 6/9/22   XI Pearson NP   insulin glargine (LANTUS;BASAGLAR) 100 UNIT/ML injection pen Inject 10 Units into the skin daily 6/9/22   XI Pearson - NP   rosuvastatin (CRESTOR) 20 MG tablet Take 1 tablet by mouth daily  Patient taking differently: Take 20 mg by mouth at bedtime 6/9/22   XI Pearson - VALERIA   BABY ASPIRIN PO Take by mouth Daily with lunch    Ar Automatic Reconciliation       Future Appointments   Date Time Provider Josesito Silva   12/6/2022 10:40 AM XI Pearson NP PRE [facility] AMB   12/7/2022 10:30 AM XI Thakur NP PGUMAU [facility] AMB   3/7/2023  2:20 PM XI Pearson NP PRE [facility] AMB

## 2022-11-29 ENCOUNTER — CARE COORDINATION (OUTPATIENT)
Dept: CARE COORDINATION | Facility: CLINIC | Age: 82
End: 2022-11-29

## 2022-11-29 NOTE — CARE COORDINATION
Ambulatory Care Coordination Note  11/29/2022    ACC: Karyle Bees, RAGHAV    Ccm outreached to patient. Patient states he is doing well at this time. Patient states no questions or concerns. Ccm discussed that I would outreach next week. Patient agreeable. Offered patient enrollment in the Remote Patient Monitoring (RPM) program for in-home monitoring: NA. Lab Results       None            Care Coordination Interventions    Referral from Primary Care Provider: No  Suggested Interventions and Community Resources          Goals Addressed                   This Visit's Progress     Conditions and Symptoms   On track     I will schedule office visits, as directed by my provider. I will keep my appointment or reschedule if I have to cancel. I will notify my provider of any barriers to my plan of care. I will notify my provider of any symptoms that indicate a worsening of my condition. Barriers: none  Plan for overcoming my barriers: N/A  Confidence: 9/10  Anticipated Goal Completion Date: 11/9/22         Medication Management   On track     I will take my medication as directed. I will notify my provider of any problems with medications, like adverse effects or side effects. I will notify my provider/Care Coordinator if I am unable to afford my medications. I will notify my provider for advice before I stop taking any of my medication. Barriers: none  Plan for overcoming my barriers: N/A  Confidence: 9/10  Anticipated Goal Completion Date: 11/9/22              Prior to Admission medications    Medication Sig Start Date End Date Taking?  Authorizing Provider   Hyoscyamine Sulfate SL (LEVSIN/SL) 0.125 MG SUBL Place 0.125 mg under the tongue every 4 hours as needed (bladder spasms) 11/20/22   Gypsy Rae MD   potassium chloride (KLOR-CON) 20 MEQ packet Take 20 mEq by mouth daily    Historical Provider, MD   magnesium oxide (MAG-OX) 400 MG tablet Take 400 mg by mouth daily    Historical Provider, MD B-D ULTRAFINE III SHORT PEN 31G X 8 MM MISC Inject 1 each into the skin daily 8/23/22   XI Acuna NP   losartan (COZAAR) 25 MG tablet Take 25 mg by mouth Daily with lunch    Historical Provider, MD   tamsulosin (FLOMAX) 0.4 MG capsule Take 1 capsule by mouth daily  Patient taking differently: Take 0.4 mg by mouth Daily with lunch 9/12/22   Razia Razo MD   finasteride (PROSCAR) 5 MG tablet Take 1 tablet by mouth daily  Patient taking differently: Take 5 mg by mouth Daily with lunch 9/12/22   Razia Razo MD   metoprolol succinate (TOPROL XL) 25 MG extended release tablet Take 1 tablet by mouth in the morning.   Patient taking differently: Take 25 mg by mouth Daily with lunch 8/2/22   XI Acuna NP   Marguerita Coad ULTRA strip Inject 1 each into the skin daily  5/29/22   XI Acuna NP   B Complex Vitamins (B COMPLEX PO) Take 1 tablet by mouth daily    Historical Provider, MD   cyanocobalamin 1000 MCG tablet Take 1,000 mcg by mouth daily     Historical Provider, MD   Omega-3 Fatty Acids (FISH OIL OMEGA-3 PO) Take by mouth daily    Historical Provider, MD   Coenzyme Q10 (CO Q-10 PO) Take by mouth daily    Historical Provider, MD   empagliflozin (JARDIANCE) 25 MG tablet Take 1 tablet by mouth daily  Patient taking differently: Take 25 mg by mouth Daily with lunch 6/9/22   XI Acuna NP   ezetimibe (ZETIA) 10 mg tablet Take 1 tablet by mouth daily  Patient taking differently: Take 10 mg by mouth at bedtime 6/9/22   XI Acuna NP   glimepiride (AMARYL) 2 MG tablet Take 1 tablet by mouth daily 6/9/22   XI Acuna NP   insulin glargine (LANTUS;BASAGLAR) 100 UNIT/ML injection pen Inject 10 Units into the skin daily 6/9/22   XI Acuna NP   rosuvastatin (CRESTOR) 20 MG tablet Take 1 tablet by mouth daily  Patient taking differently: Take 20 mg by mouth at bedtime 6/9/22   XI Acuna NP   BABY ASPIRIN PO Take by mouth Daily with lunch    Ar Automatic Reconciliation       Future Appointments   Date Time Provider Josesito Silva   12/6/2022 10:40 AM XI Womack NP PRE [facility] AM[facility]   12/7/2022 10:30 AM XI Pierre NP [facility] AM[facility]   3/7/2023  2:20 PM XI Womack NP PRE [facility] AMB

## 2022-12-06 ENCOUNTER — CARE COORDINATION (OUTPATIENT)
Dept: CARE COORDINATION | Facility: CLINIC | Age: 82
End: 2022-12-06

## 2022-12-06 ENCOUNTER — OFFICE VISIT (OUTPATIENT)
Dept: FAMILY MEDICINE CLINIC | Facility: CLINIC | Age: 82
End: 2022-12-06
Payer: MEDICARE

## 2022-12-06 VITALS
SYSTOLIC BLOOD PRESSURE: 110 MMHG | HEIGHT: 69 IN | WEIGHT: 167 LBS | DIASTOLIC BLOOD PRESSURE: 72 MMHG | BODY MASS INDEX: 24.73 KG/M2 | TEMPERATURE: 98 F | HEART RATE: 65 BPM

## 2022-12-06 DIAGNOSIS — I10 BENIGN ESSENTIAL HYPERTENSION: Primary | ICD-10-CM

## 2022-12-06 DIAGNOSIS — E78.5 HYPERLIPIDEMIA ASSOCIATED WITH TYPE 2 DIABETES MELLITUS (HCC): ICD-10-CM

## 2022-12-06 DIAGNOSIS — Z79.4 TYPE 2 DIABETES MELLITUS WITHOUT COMPLICATION, WITH LONG-TERM CURRENT USE OF INSULIN (HCC): ICD-10-CM

## 2022-12-06 DIAGNOSIS — I25.10 CORONARY ARTERY DISEASE INVOLVING NATIVE CORONARY ARTERY OF NATIVE HEART WITHOUT ANGINA PECTORIS: ICD-10-CM

## 2022-12-06 DIAGNOSIS — E11.69 HYPERLIPIDEMIA ASSOCIATED WITH TYPE 2 DIABETES MELLITUS (HCC): ICD-10-CM

## 2022-12-06 DIAGNOSIS — E11.9 TYPE 2 DIABETES MELLITUS WITHOUT COMPLICATION, WITH LONG-TERM CURRENT USE OF INSULIN (HCC): ICD-10-CM

## 2022-12-06 PROCEDURE — 3074F SYST BP LT 130 MM HG: CPT | Performed by: NURSE PRACTITIONER

## 2022-12-06 PROCEDURE — 1123F ACP DISCUSS/DSCN MKR DOCD: CPT | Performed by: NURSE PRACTITIONER

## 2022-12-06 PROCEDURE — 99214 OFFICE O/P EST MOD 30 MIN: CPT | Performed by: NURSE PRACTITIONER

## 2022-12-06 PROCEDURE — 3051F HG A1C>EQUAL 7.0%<8.0%: CPT | Performed by: NURSE PRACTITIONER

## 2022-12-06 PROCEDURE — 3078F DIAST BP <80 MM HG: CPT | Performed by: NURSE PRACTITIONER

## 2022-12-06 RX ORDER — ROSUVASTATIN CALCIUM 20 MG/1
20 TABLET, COATED ORAL NIGHTLY
Qty: 90 TABLET | Refills: 1 | Status: SHIPPED | OUTPATIENT
Start: 2022-12-06

## 2022-12-06 RX ORDER — LOSARTAN POTASSIUM 25 MG/1
25 TABLET ORAL
Qty: 90 TABLET | Refills: 1 | Status: SHIPPED | OUTPATIENT
Start: 2022-12-06

## 2022-12-06 RX ORDER — GLIMEPIRIDE 2 MG/1
2 TABLET ORAL DAILY
Qty: 90 TABLET | Refills: 1 | Status: SHIPPED | OUTPATIENT
Start: 2022-12-06

## 2022-12-06 RX ORDER — EZETIMIBE 10 MG/1
10 TABLET ORAL DAILY
Qty: 90 TABLET | Refills: 1 | Status: SHIPPED | OUTPATIENT
Start: 2022-12-06

## 2022-12-06 RX ORDER — METOPROLOL SUCCINATE 25 MG/1
25 TABLET, EXTENDED RELEASE ORAL DAILY
Qty: 90 TABLET | Refills: 1 | Status: SHIPPED | OUTPATIENT
Start: 2022-12-06

## 2022-12-06 ASSESSMENT — ENCOUNTER SYMPTOMS
COUGH: 0
CHEST TIGHTNESS: 0
ABDOMINAL PAIN: 0
SHORTNESS OF BREATH: 0

## 2022-12-06 NOTE — CARE COORDINATION
Ambulatory Care Management Note        Date/Time:  12/6/2022 10:14 AM    Ccm outreached to patient. No answer at this time, message left. Awaiting response. If no response, ccm will outreach next week.

## 2022-12-06 NOTE — PROGRESS NOTES
Subjective:      Patient ID: Elmo Robison is a 78 y.o. male. He is here for follow up for   Htn controlled on current med  DM bs are 120-140  Cholesterol controlled on current med  Had a TURP and has upcoming colonoscopy   Still planning to move in April back up [facility]  HPI    Review of Systems   Constitutional:  Negative for chills, fatigue and fever. Respiratory:  Negative for cough, chest tightness and shortness of breath. Cardiovascular:  Negative for chest pain, palpitations and leg swelling. Gastrointestinal:  Negative for abdominal pain. Objective:   Physical Exam  Vitals and nursing note reviewed. Constitutional:       Appearance: Normal appearance. He is normal weight. HENT:      Head: Normocephalic. Cardiovascular:      Rate and Rhythm: Normal rate and regular rhythm. Pulses: Normal pulses. Heart sounds: Normal heart sounds. Pulmonary:      Effort: Pulmonary effort is normal.      Breath sounds: Normal breath sounds. Musculoskeletal:         General: Normal range of motion. Skin:     General: Skin is warm and dry. Capillary Refill: Capillary refill takes less than 2 seconds. Neurological:      General: No focal deficit present. Mental Status: He is alert. Psychiatric:         Mood and Affect: Mood normal.         Behavior: Behavior normal.         Thought Content: Thought content normal.         Judgment: Judgment normal.       Assessment:      /72 (Site: Left Upper Arm, Position: Sitting, Cuff Size: Medium Adult)   Pulse 65   Temp 98 °F (36.7 °C) (Temporal)   Ht 5' 9\" (1.753 m)   Wt 167 lb (75.8 kg)   BMI 24.66 kg/m²        Plan:      1. Benign essential hypertension  -     losartan (COZAAR) 25 MG tablet; Take 1 tablet by mouth Daily with lunch, Disp-90 tablet, R-1Normal  2. Type 2 diabetes mellitus without complication, with long-term current use of insulin (HCC)  -     empagliflozin (JARDIANCE) 25 MG tablet;  Take 1 tablet by mouth daily, Disp-90 tablet, R-1Normal  -     glimepiride (AMARYL) 2 MG tablet; Take 1 tablet by mouth daily, Disp-90 tablet, R-1Normal  -     insulin glargine (LANTUS;BASAGLAR) 100 UNIT/ML injection pen; Inject 10 Units into the skin daily, Disp-5 Adjustable Dose Pre-filled Pen Syringe, R-5Normal  3. Hyperlipidemia associated with type 2 diabetes mellitus (HCC)  -     ezetimibe (ZETIA) 10 MG tablet; Take 1 tablet by mouth daily, Disp-90 tablet, R-1Normal  -     rosuvastatin (CRESTOR) 20 MG tablet; Take 1 tablet by mouth at bedtime, Disp-90 tablet, R-1Normal  4. Coronary artery disease involving native coronary artery of native heart without angina pectoris  -     metoprolol succinate (TOPROL XL) 25 MG extended release tablet;  Take 1 tablet by mouth daily, Disp-90 tablet, R-1Normal   Meds refilled will return in April for rechXI Jang NP

## 2022-12-07 ENCOUNTER — OFFICE VISIT (OUTPATIENT)
Dept: UROLOGY | Age: 82
End: 2022-12-07
Payer: MEDICARE

## 2022-12-07 DIAGNOSIS — N40.0 BENIGN PROSTATIC HYPERPLASIA, UNSPECIFIED WHETHER LOWER URINARY TRACT SYMPTOMS PRESENT: Primary | ICD-10-CM

## 2022-12-07 LAB
BILIRUBIN, URINE, POC: NORMAL
BLOOD URINE, POC: NORMAL
GLUCOSE URINE, POC: 500
KETONES, URINE, POC: NEGATIVE
LEUKOCYTE ESTERASE, URINE, POC: NORMAL
NITRITE, URINE, POC: NEGATIVE
PH, URINE, POC: 6 (ref 4.6–8)
PROTEIN,URINE, POC: 100
SPECIFIC GRAVITY, URINE, POC: 1.01 (ref 1–1.03)
URINALYSIS CLARITY, POC: NORMAL
URINALYSIS COLOR, POC: NORMAL
UROBILINOGEN, POC: NORMAL

## 2022-12-07 PROCEDURE — 99024 POSTOP FOLLOW-UP VISIT: CPT | Performed by: NURSE PRACTITIONER

## 2022-12-07 PROCEDURE — 81003 URINALYSIS AUTO W/O SCOPE: CPT | Performed by: NURSE PRACTITIONER

## 2022-12-07 ASSESSMENT — ENCOUNTER SYMPTOMS
BACK PAIN: 0
NAUSEA: 0

## 2022-12-07 NOTE — PROGRESS NOTES
[address], [facility] [address]  [phone]          Julisa Márquez  : 1940    Chief Complaint   Patient presents with    Follow-up          HPI     Julisa Márquez is a 78 y.o. male    Status post TURP done by Dr. [employer] 2022. Pathology came back benign. Patient reports overall he is doing well. He does have a high sense of urgency at times. He has not had any incontinence. He continues to see blood intermittently. He is not having any clots. Has occasional dysuria. He has continued to take the Flomax and finasteride have advised him that he can stop that medication.       Past Medical History:   Diagnosis Date    Bilateral carotid artery stenosis     mild    BPH (benign prostatic hyperplasia)     CAD (coronary artery disease)     Cardiac defibrillator in place     and pacemaker; [employer]; left of chest; per pt no shocks    Diabetes ([facility] [address]     type 2; oral and insulin reliant; AVG BS 120s; pt denies s.s. of hypoglycemia; last A1C 7.0 on 2022    Diverticulitis     s/p colectomy    Heart disease     High cholesterol     Histoplasmosis     right eye    History of AAA (abdominal aortic aneurysm) repair     History of abdominal aortic aneurysm (AAA)     History of MI (myocardial infarction)     x2    History of percutaneous coronary intervention     x2 heart stents    Ischemic cardiomyopathy     Followed by Dr. [facility]    Legally blind     Macular degeneration     legally blind    Stage 3 chronic kidney disease ([facility] [address]      Past Surgical History:   Procedure Laterality Date    ABDOMINAL AORTIC ANEURYSM REPAIR      CARDIAC CATHETERIZATION      x2 heart stents    CARDIAC DEFIBRILLATOR PLACEMENT Left     [employer]    CATARACT REMOVAL      rigth eye    HX PARTIAL COLECTOMY      due to severe diverticulosis    TURP N/A 2022    CYSTOSCOPY TRANSURETHRAL RESECTION PROSTATE performed by Monie Luis MD at [facility]     Current Outpatient Medications   Medication Sig Dispense Refill    empagliflozin (JARDIANCE) 25 MG tablet Take 1 tablet by mouth daily 90 tablet 1    ezetimibe (ZETIA) 10 MG tablet Take 1 tablet by mouth daily 90 tablet 1    glimepiride (AMARYL) 2 MG tablet Take 1 tablet by mouth daily 90 tablet 1    insulin glargine (LANTUS;BASAGLAR) 100 UNIT/ML injection pen Inject 10 Units into the skin daily 5 Adjustable Dose Pre-filled Pen Syringe 5    metoprolol succinate (TOPROL XL) 25 MG extended release tablet Take 1 tablet by mouth daily 90 tablet 1    rosuvastatin (CRESTOR) 20 MG tablet Take 1 tablet by mouth at bedtime 90 tablet 1    losartan (COZAAR) 25 MG tablet Take 1 tablet by mouth Daily with lunch 90 tablet 1    potassium chloride (KLOR-CON) 20 MEQ packet Take 20 mEq by mouth daily      magnesium oxide (MAG-OX) 400 MG tablet Take 400 mg by mouth daily      B-D ULTRAFINE III SHORT PEN 31G X 8 MM MISC Inject 1 each into the skin daily      ONETOUCH ULTRA strip Inject 1 each into the skin daily       B Complex Vitamins (B COMPLEX PO) Take 1 tablet by mouth daily      cyanocobalamin 1000 MCG tablet Take 1,000 mcg by mouth daily       Omega-3 Fatty Acids (FISH OIL OMEGA-3 PO) Take by mouth daily      Coenzyme Q10 (CO Q-10 PO) Take by mouth daily      BABY ASPIRIN PO Take by mouth Daily with lunch       No current facility-administered medications for this visit. No Known Allergies  Social History     Socioeconomic History    Marital status:       Spouse name: Not on file    Number of children: Not on file    Years of education: Not on file    Highest education level: Not on file   Occupational History    Not on file   Tobacco Use    Smoking status: Former     Packs/day: 2.00     Types: Cigarettes     Quit date: 1989     Years since quittin.9    Smokeless tobacco: Never   Vaping Use    Vaping Use: Never used   Substance and Sexual Activity    Alcohol use: Yes     Comment: occ    Drug use: Never Sexual activity: Not on file   Other Topics Concern    Not on file   Social History Narrative     5  5 years ago just moved in 2021 to an independent living facility here in [place] from [place]. He has grown kids  [employer] daughter lives in [place] lives in [place]. Does not have a Health Care POA     Social Determinants of Health     Financial Resource Strain: Low Risk     Difficulty of Paying Living Expenses: Not hard at all   Food Insecurity: No Food Insecurity    Worried About Running Out of Food in the Last Year: Never true    Ran Out of Food in the Last Year: Never true   Transportation Needs: Not on file   Physical Activity: Not on file   Stress: Not on file   Social Connections: Not on file   Intimate Partner Violence: Not on file   Housing Stability: Not on file     Family History   Problem Relation Age of Onset    Abdominal aortic aneurysm Brother     Cancer Mother         esopageal       Review of Systems  Constitutional:   Negative for fever. GI:  Negative for nausea. Musculoskeletal:  Negative for back pain. Urinalysis  UA - Dipstick  Results for orders placed or performed in visit on 12/07/22   AMB POC URINALYSIS DIP STICK AUTO W/O MICRO   Result Value Ref Range    Color (UA POC)      Clarity (UA POC)      Glucose, Urine,  Negative    Bilirubin, Urine, POC Small Negative    KETONES, Urine, POC Negative Negative    Specific Gravity, Urine, POC 1.015 1.001 - 1.035    Blood (UA POC) Large Negative    pH, Urine, POC 6.0 4.6 - 8.0    Protein, Urine,  Negative    Urobilinogen, POC 1 mg/dL     Nitrite, Urine, POC Negative Negative    Leukocyte Esterase, Urine, POC Small Negative       UA - Micro  WBC - 0  RBC - 0  Bacteria - 0  Epith - 0        Assessment and Plan    ICD-10-CM    1.  Benign prostatic hyperplasia, unspecified whether lower urinary tract symptoms present  N40.0 AMB POC URINALYSIS DIP STICK AUTO W/O MICRO        PLAN:  Pathology was discussed  Patient can stop finasteride and Flomax  He will follow-up in the new year with Dr. [facility]  He understands to call sooner if any symptoms change. XI Estrada NP, Dr. is supervising physician today and he approves plan of care. Elements of this note have been dictated using speech recognition software. Although reviewed, errors of speech recognition may have occurred.

## 2022-12-13 ENCOUNTER — CARE COORDINATION (OUTPATIENT)
Dept: CARE COORDINATION | Facility: CLINIC | Age: 82
End: 2022-12-13

## 2022-12-13 NOTE — CARE COORDINATION
Ambulatory Care Coordination Note  12/13/2022    ACC: Natasha Whyte RN    Ccm outreached to patient. Patient state overall he is doing well. Patient states he had pcp appointment on 12/6/22. Patient states no questions or concerns. Ccm discussed that I would follow up next week. Patient agreeable. Offered patient enrollment in the Remote Patient Monitoring (RPM) program for in-home monitoring: NA. Lab Results       None            Care Coordination Interventions    Referral from Primary Care Provider: No  Suggested Interventions and Community Resources          Goals Addressed                   This Visit's Progress     Conditions and Symptoms   On track     I will schedule office visits, as directed by my provider. I will keep my appointment or reschedule if I have to cancel. I will notify my provider of any barriers to my plan of care. I will notify my provider of any symptoms that indicate a worsening of my condition. Barriers: none  Plan for overcoming my barriers: N/A  Confidence: 9/10  Anticipated Goal Completion Date: 11/9/22         Medication Management   On track     I will take my medication as directed. I will notify my provider of any problems with medications, like adverse effects or side effects. I will notify my provider/Care Coordinator if I am unable to afford my medications. I will notify my provider for advice before I stop taking any of my medication. Barriers: none  Plan for overcoming my barriers: N/A  Confidence: 9/10  Anticipated Goal Completion Date: 11/9/22              Prior to Admission medications    Medication Sig Start Date End Date Taking?  Authorizing Provider   empagliflozin (JARDIANCE) 25 MG tablet Take 1 tablet by mouth daily 12/6/22   XI Bradley NP   ezetimibe (ZETIA) 10 MG tablet Take 1 tablet by mouth daily 12/6/22   XI Bradley NP   glimepiride (AMARYL) 2 MG tablet Take 1 tablet by mouth daily 12/6/22   XI Bradley NP   insulin glargine (LANTUS;BASAGLAR) 100 UNIT/ML injection pen Inject 10 Units into the skin daily 12/6/22   XI Onofre NP   metoprolol succinate (TOPROL XL) 25 MG extended release tablet Take 1 tablet by mouth daily 12/6/22   XI Onofre NP   rosuvastatin (CRESTOR) 20 MG tablet Take 1 tablet by mouth at bedtime 12/6/22   XI Onofre NP   losartan (COZAAR) 25 MG tablet Take 1 tablet by mouth Daily with lunch 12/6/22   XI Onofre NP   potassium chloride (KLOR-CON) 20 MEQ packet Take 20 mEq by mouth daily    Historical Provider, MD   magnesium oxide (MAG-OX) 400 MG tablet Take 400 mg by mouth daily    Historical Provider, MD TABARES ULTRAFINE III SHORT PEN 31G X 8 MM MISC Inject 1 each into the skin daily 8/23/22   XI Onofre NP   [employer] ULTRA strip Inject 1 each into the skin daily  5/29/22   XI Onofre NP   B Complex Vitamins (B COMPLEX PO) Take 1 tablet by mouth daily    Historical Provider, MD   cyanocobalamin 1000 MCG tablet Take 1,000 mcg by mouth daily     Historical Provider, MD   Omega-3 Fatty Acids (FISH OIL OMEGA-3 PO) Take by mouth daily    Historical Provider, MD   Coenzyme Q10 (CO Q-10 PO) Take by mouth daily    Historical Provider, MD   BABY ASPIRIN PO Take by mouth Daily with lunch    Ar Automatic Reconciliation       Future Appointments   Date Time Provider Josesito Silva   3/7/2023  2:20 PM XI Onofre NP PRE [facility] AMB   3/20/2023  9:45 AM Elvis Pa MD [facility] AMB

## 2022-12-19 ENCOUNTER — CARE COORDINATION (OUTPATIENT)
Dept: CARE COORDINATION | Facility: CLINIC | Age: 82
End: 2022-12-19

## 2022-12-19 NOTE — CARE COORDINATION
Ambulatory Care Management Note        Date/Time:  12/19/2022 1:04 PM    Ccm outreached to patient. No answer at this time, message left. Awaiting response. If no response, ccm will outreach in 2 weeks.

## 2023-01-02 ENCOUNTER — CARE COORDINATION (OUTPATIENT)
Dept: CARE COORDINATION | Facility: CLINIC | Age: 83
End: 2023-01-02

## 2023-01-02 NOTE — CARE COORDINATION
Ambulatory Care Coordination Note  1/2/2023    ACC: Alfreda Almanza, RN    Ccm outreached to patient. Patient states he is doing well at this time. Patient states no questions or concerns. Ccm discussed that I would outreach next week. Patient agreeable. Offered patient enrollment in the Remote Patient Monitoring (RPM) program for in-home monitoring: NA. Lab Results       None            Care Coordination Interventions    Referral from Primary Care Provider: No  Suggested Interventions and Community Resources          Goals Addressed                   This Visit's Progress     Conditions and Symptoms   On track     I will schedule office visits, as directed by my provider. I will keep my appointment or reschedule if I have to cancel. I will notify my provider of any barriers to my plan of care. I will notify my provider of any symptoms that indicate a worsening of my condition. Barriers: none  Plan for overcoming my barriers: N/A  Confidence: 9/10  Anticipated Goal Completion Date: 11/9/22         Medication Management   On track     I will take my medication as directed. I will notify my provider of any problems with medications, like adverse effects or side effects. I will notify my provider/Care Coordinator if I am unable to afford my medications. I will notify my provider for advice before I stop taking any of my medication. Barriers: none  Plan for overcoming my barriers: N/A  Confidence: 9/10  Anticipated Goal Completion Date: 11/9/22              Prior to Admission medications    Medication Sig Start Date End Date Taking?  Authorizing Provider   empagliflozin (JARDIANCE) 25 MG tablet Take 1 tablet by mouth daily 12/6/22   Candida Santiago, APRN - NP   ezetimibe (ZETIA) 10 MG tablet Take 1 tablet by mouth daily 12/6/22   Candida Santiago, APRN - NP   glimepiride (AMARYL) 2 MG tablet Take 1 tablet by mouth daily 12/6/22   Candida Santiago, APRN - NP   insulin glargine (LANTUS;BASAGLAR) 100 UNIT/ML injection pen Inject 10 Units into the skin daily 12/6/22   [facility], APRN - NP   metoprolol succinate (TOPROL XL) 25 MG extended release tablet Take 1 tablet by mouth daily 12/6/22   [facility], APRN - NP   rosuvastatin (CRESTOR) 20 MG tablet Take 1 tablet by mouth at bedtime 12/6/22   [facility], APRN - NP   losartan (COZAAR) 25 MG tablet Take 1 tablet by mouth Daily with lunch 12/6/22   [facility], APRN - NP   potassium chloride (KLOR-CON) 20 MEQ packet Take 20 mEq by mouth daily    Historical Provider, MD   magnesium oxide (MAG-OX) 400 MG tablet Take 400 mg by mouth daily    Historical Provider, MD TABARES ULTRAFINE III SHORT PEN 31G X 8 MM MISC Inject 1 each into the skin daily 8/23/22   [facility], APRN - NP   Katrin Javier ULTRA strip Inject 1 each into the skin daily  5/29/22   [facility], APRN - NP   B Complex Vitamins (B COMPLEX PO) Take 1 tablet by mouth daily    Historical Provider, MD   cyanocobalamin 1000 MCG tablet Take 1,000 mcg by mouth daily     Historical Provider, MD   Omega-3 Fatty Acids (FISH OIL OMEGA-3 PO) Take by mouth daily    Historical Provider, MD   Coenzyme Q10 (CO Q-10 PO) Take by mouth daily    Historical Provider, MD   BABY ASPIRIN PO Take by mouth Daily with lunch    Ar Automatic Reconciliation       Future Appointments   Date Time Provider Josesito Silva   3/7/2023  2:20 PM [facility], APRN - NP PRE [facility] AMB   3/20/2023  9:45 AM Jason Gallego MD [facility] AMB

## 2023-01-09 ENCOUNTER — CARE COORDINATION (OUTPATIENT)
Dept: CARE COORDINATION | Facility: CLINIC | Age: 83
End: 2023-01-09

## 2023-01-09 NOTE — CARE COORDINATION
Ambulatory Care Management Note        Date/Time:  1/9/2023 12:39 PM    Ccm outreached to patient. No answer at this time, message left. Awaiting response. If no response, ccm will outreach next week.

## 2023-01-16 ENCOUNTER — CARE COORDINATION (OUTPATIENT)
Dept: CARE COORDINATION | Facility: CLINIC | Age: 83
End: 2023-01-16

## 2023-01-16 NOTE — CARE COORDINATION
Ambulatory Care Coordination Note  1/16/2023    ACC: Colletta Ridge, RN    Ccm outreached to patient. Patient state she is doing well at this time. Patient states no questions or concerns. Ccm discussed that I would outreach next week. Patient agreeable. Offered patient enrollment in the Remote Patient Monitoring (RPM) program for in-home monitoring: NA. Lab Results       None            Care Coordination Interventions    Referral from Primary Care Provider: No  Suggested Interventions and Community Resources          Goals Addressed                   This Visit's Progress     Conditions and Symptoms   On track     I will schedule office visits, as directed by my provider. I will keep my appointment or reschedule if I have to cancel. I will notify my provider of any barriers to my plan of care. I will notify my provider of any symptoms that indicate a worsening of my condition. Barriers: none  Plan for overcoming my barriers: N/A  Confidence: 9/10  Anticipated Goal Completion Date: 11/9/22         Medication Management   On track     I will take my medication as directed. I will notify my provider of any problems with medications, like adverse effects or side effects. I will notify my provider/Care Coordinator if I am unable to afford my medications. I will notify my provider for advice before I stop taking any of my medication. Barriers: none  Plan for overcoming my barriers: N/A  Confidence: 9/10  Anticipated Goal Completion Date: 11/9/22              Prior to Admission medications    Medication Sig Start Date End Date Taking?  Authorizing Provider   empagliflozin (JARDIANCE) 25 MG tablet Take 1 tablet by mouth daily 12/6/22   XI Ruiz NP   ezetimibe (ZETIA) 10 MG tablet Take 1 tablet by mouth daily 12/6/22   XI Ruiz NP   glimepiride (AMARYL) 2 MG tablet Take 1 tablet by mouth daily 12/6/22   XI Ruiz NP   insulin glargine (LANTUS;BASAGLAR) 100 UNIT/ML injection pen Inject 10 Units into the skin daily 12/6/22   XI Rich NP   metoprolol succinate (TOPROL XL) 25 MG extended release tablet Take 1 tablet by mouth daily 12/6/22   XI Rich NP   rosuvastatin (CRESTOR) 20 MG tablet Take 1 tablet by mouth at bedtime 12/6/22   XI Rich NP   losartan (COZAAR) 25 MG tablet Take 1 tablet by mouth Daily with lunch 12/6/22   XI Rich NP   potassium chloride (KLOR-CON) 20 MEQ packet Take 20 mEq by mouth daily    Historical Provider, MD   magnesium oxide (MAG-OX) 400 MG tablet Take 400 mg by mouth daily    Historical Provider, MD TABARES ULTRAFINE III SHORT PEN 31G X 8 MM MISC Inject 1 each into the skin daily 8/23/22   XI Rich NP   ONETOUCH ULTRA strip Inject 1 each into the skin daily  5/29/22   XI Rich NP   B Complex Vitamins (B COMPLEX PO) Take 1 tablet by mouth daily    Historical Provider, MD   cyanocobalamin 1000 MCG tablet Take 1,000 mcg by mouth daily     Historical Provider, MD   Omega-3 Fatty Acids (FISH OIL OMEGA-3 PO) Take by mouth daily    Historical Provider, MD   Coenzyme Q10 (CO Q-10 PO) Take by mouth daily    Historical Provider, MD   BABY ASPIRIN PO Take by mouth Daily with lunch    Ar Automatic Reconciliation       Future Appointments   Date Time Provider Department Center   3/7/2023  2:20 PM XI Rich NP PRE [facility] AMB   3/20/2023  9:45 AM Chaim Lacy MD [facility] AMB

## 2023-01-23 ENCOUNTER — CARE COORDINATION (OUTPATIENT)
Dept: CARE COORDINATION | Facility: CLINIC | Age: 83
End: 2023-01-23

## 2023-01-23 NOTE — CARE COORDINATION
Ambulatory Care Coordination Note  1/23/2023    ACC: Ada Choudhury, RN    Ccm outreached to patient. Patient states he is doing well at this time. Patient states no questions or concerns. Ccm discussed that I would outreach next week. Patient agreeable. Offered patient enrollment in the Remote Patient Monitoring (RPM) program for in-home monitoring: NA. Lab Results       None            Care Coordination Interventions    Referral from Primary Care Provider: No  Suggested Interventions and Community Resources          Goals Addressed                   This Visit's Progress     Conditions and Symptoms   On track     I will schedule office visits, as directed by my provider. I will keep my appointment or reschedule if I have to cancel. I will notify my provider of any barriers to my plan of care. I will notify my provider of any symptoms that indicate a worsening of my condition. Barriers: none  Plan for overcoming my barriers: N/A  Confidence: 9/10  Anticipated Goal Completion Date: 11/9/22         Medication Management   On track     I will take my medication as directed. I will notify my provider of any problems with medications, like adverse effects or side effects. I will notify my provider/Care Coordinator if I am unable to afford my medications. I will notify my provider for advice before I stop taking any of my medication. Barriers: none  Plan for overcoming my barriers: N/A  Confidence: 9/10  Anticipated Goal Completion Date: 11/9/22              Prior to Admission medications    Medication Sig Start Date End Date Taking?  Authorizing Provider   empagliflozin (JARDIANCE) 25 MG tablet Take 1 tablet by mouth daily 12/6/22   Luzmaria Males, APRN - NP   ezetimibe (ZETIA) 10 MG tablet Take 1 tablet by mouth daily 12/6/22   Luzmaria Males, APRN - NP   glimepiride (AMARYL) 2 MG tablet Take 1 tablet by mouth daily 12/6/22   Luzmaria Males, APRN - NP   insulin glargine (LANTUS;BASAGLAR) 100 UNIT/ML injection pen Inject 10 Units into the skin daily 12/6/22   XI Rios NP   metoprolol succinate (TOPROL XL) 25 MG extended release tablet Take 1 tablet by mouth daily 12/6/22   XI Rios NP   rosuvastatin (CRESTOR) 20 MG tablet Take 1 tablet by mouth at bedtime 12/6/22   XI Rios NP   losartan (COZAAR) 25 MG tablet Take 1 tablet by mouth Daily with lunch 12/6/22   XI Rios NP   potassium chloride (KLOR-CON) 20 MEQ packet Take 20 mEq by mouth daily    Historical Provider, MD   magnesium oxide (MAG-OX) 400 MG tablet Take 400 mg by mouth daily    Historical Provider, MD TABARES ULTRAFINE III SHORT PEN 31G X 8 MM MISC Inject 1 each into the skin daily 8/23/22   XI Rios NP   Hobert Kappa ULTRA strip Inject 1 each into the skin daily  5/29/22   XI Rios NP   B Complex Vitamins (B COMPLEX PO) Take 1 tablet by mouth daily    Historical Provider, MD   cyanocobalamin 1000 MCG tablet Take 1,000 mcg by mouth daily     Historical Provider, MD   Omega-3 Fatty Acids (FISH OIL OMEGA-3 PO) Take by mouth daily    Historical Provider, MD   Coenzyme Q10 (CO Q-10 PO) Take by mouth daily    Historical Provider, MD   BABY ASPIRIN PO Take by mouth Daily with lunch    Ar Automatic Reconciliation       Future Appointments   Date Time Provider Josesito Silva   3/7/2023  2:20 PM XI Rios NP PRE [facility]   3/20/2023  9:45 AM Olga Pineda MD [facility]

## 2023-01-30 ENCOUNTER — CARE COORDINATION (OUTPATIENT)
Dept: CARE COORDINATION | Facility: CLINIC | Age: 83
End: 2023-01-30

## 2023-01-30 NOTE — CARE COORDINATION
Ambulatory Care Management Note        Date/Time:  1/30/2023 12:20 PM    Ccm outreached to patient. No answer at this time, message left. Awaiting response. If no response, ccm will outreach next week.

## 2023-02-06 ENCOUNTER — CARE COORDINATION (OUTPATIENT)
Dept: CARE COORDINATION | Facility: CLINIC | Age: 83
End: 2023-02-06

## 2023-02-06 NOTE — CARE COORDINATION
Ambulatory Care Coordination Note  2/6/2023    ACC: uJan Ferraro, RN    Ccm outreached to patient. Patient states he is doing well at this time. Patient states no questions or concerns. Ccm discussed that I would outreach next week. Patient agreeable. Offered patient enrollment in the Remote Patient Monitoring (RPM) program for in-home monitoring: NA. Lab Results       None            Care Coordination Interventions    Referral from Primary Care Provider: No  Suggested Interventions and Community Resources          Goals Addressed                   This Visit's Progress     Conditions and Symptoms   On track     I will schedule office visits, as directed by my provider. I will keep my appointment or reschedule if I have to cancel. I will notify my provider of any barriers to my plan of care. I will notify my provider of any symptoms that indicate a worsening of my condition. Barriers: none  Plan for overcoming my barriers: N/A  Confidence: 9/10  Anticipated Goal Completion Date: 11/9/22         Medication Management   On track     I will take my medication as directed. I will notify my provider of any problems with medications, like adverse effects or side effects. I will notify my provider/Care Coordinator if I am unable to afford my medications. I will notify my provider for advice before I stop taking any of my medication. Barriers: none  Plan for overcoming my barriers: N/A  Confidence: 9/10  Anticipated Goal Completion Date: 11/9/22              Prior to Admission medications    Medication Sig Start Date End Date Taking?  Authorizing Provider   empagliflozin (JARDIANCE) 25 MG tablet Take 1 tablet by mouth daily 12/6/22   Joe Divine, APRN - NP   ezetimibe (ZETIA) 10 MG tablet Take 1 tablet by mouth daily 12/6/22   Joe Divine, APRN - NP   glimepiride (AMARYL) 2 MG tablet Take 1 tablet by mouth daily 12/6/22   Joe Divine, APRN - NP   insulin glargine (LANTUS;BASAGLAR) 100 UNIT/ML injection pen Inject 10 Units into the skin daily 12/6/22   Cassi XI Parada - NP   metoprolol succinate (TOPROL XL) 25 MG extended release tablet Take 1 tablet by mouth daily 12/6/22   Cassi XI Parada - NP   rosuvastatin (CRESTOR) 20 MG tablet Take 1 tablet by mouth at bedtime 12/6/22   [facility]   losartan (COZAAR) 25 MG tablet Take 1 tablet by mouth Daily with lunch 12/6/22   Cassi XI Parada - NP   potassium chloride (KLOR-CON) 20 MEQ packet Take 20 mEq by mouth daily    Historical Provider, MD   magnesium oxide (MAG-OX) 400 MG tablet Take 400 mg by mouth daily    Historical Provider, MD TABARES ULTRAFINE III SHORT PEN 31G X 8 MM MISC Inject 1 each into the skin daily 8/23/22   Cassi XI Parada NP   Star Borer ULTRA strip Inject 1 each into the skin daily  5/29/22   [facility], APR[facility] - NP   B Complex Vitamins (B COMPLEX PO) Take 1 tablet by mouth daily    Historical Provider, MD   cyanocobalamin 1000 MCG tablet Take 1,000 mcg by mouth daily     Historical Provider, MD   Omega-3 Fatty Acids (FISH OIL OMEGA-3 PO) Take by mouth daily    Historical Provider, MD   Coenzyme Q10 (CO Q-10 PO) Take by mouth daily    Historical Provider, MD   BABY ASPIRIN PO Take by mouth Daily with lunch    Ar Automatic Reconciliation       Future Appointments   Date Time Provider Josesito Silva   3/7/2023  2:20 PM XI Cordova NP PRE [facility]   3/20/2023  9:45 AM Marita Hernandez MD [facility]

## 2023-02-13 ENCOUNTER — CARE COORDINATION (OUTPATIENT)
Dept: CARE COORDINATION | Facility: CLINIC | Age: 83
End: 2023-02-13

## 2023-02-13 NOTE — CARE COORDINATION
Patient has graduated from the Complex Case Management  program on 2/13/23. Patient/family has the ability to self-manage at this time. Care management goals have been completed. No further Ambulatory Care Manager follow up scheduled. Goals Addressed                   This Visit's Progress     COMPLETED: Conditions and Symptoms        I will schedule office visits, as directed by my provider. I will keep my appointment or reschedule if I have to cancel. I will notify my provider of any barriers to my plan of care. I will notify my provider of any symptoms that indicate a worsening of my condition. Barriers: none  Plan for overcoming my barriers: N/A  Confidence: 9/10  Anticipated Goal Completion Date: 11/9/22         COMPLETED: Medication Management        I will take my medication as directed. I will notify my provider of any problems with medications, like adverse effects or side effects. I will notify my provider/Care Coordinator if I am unable to afford my medications. I will notify my provider for advice before I stop taking any of my medication. Barriers: none  Plan for overcoming my barriers: N/A  Confidence: 9/10  Anticipated Goal Completion Date: 11/9/22              Patient has Ambulatory Care Manager's contact information for any further questions, concerns, or needs.   Patients upcoming visits:    Future Appointments   Date Time Provider Josesito Silva   3/7/2023  2:20 PM XI Lino - [facility] PRE [facility]   3/20/2023  9:45 AM Parisa Celis MD [facility]

## 2023-03-07 ENCOUNTER — OFFICE VISIT (OUTPATIENT)
Dept: FAMILY MEDICINE CLINIC | Facility: CLINIC | Age: 83
End: 2023-03-07

## 2023-03-07 VITALS
BODY MASS INDEX: 25.62 KG/M2 | HEIGHT: 69 IN | TEMPERATURE: 97.8 F | DIASTOLIC BLOOD PRESSURE: 67 MMHG | WEIGHT: 173 LBS | SYSTOLIC BLOOD PRESSURE: 127 MMHG | HEART RATE: 60 BPM

## 2023-03-07 DIAGNOSIS — E11.69 HYPERLIPIDEMIA ASSOCIATED WITH TYPE 2 DIABETES MELLITUS (HCC): Primary | ICD-10-CM

## 2023-03-07 DIAGNOSIS — Z79.4 TYPE 2 DIABETES MELLITUS WITHOUT COMPLICATION, WITH LONG-TERM CURRENT USE OF INSULIN (HCC): ICD-10-CM

## 2023-03-07 DIAGNOSIS — E11.9 TYPE 2 DIABETES MELLITUS WITHOUT COMPLICATION, WITH LONG-TERM CURRENT USE OF INSULIN (HCC): ICD-10-CM

## 2023-03-07 DIAGNOSIS — E11.69 HYPERLIPIDEMIA ASSOCIATED WITH TYPE 2 DIABETES MELLITUS (HCC): ICD-10-CM

## 2023-03-07 DIAGNOSIS — E78.5 HYPERLIPIDEMIA ASSOCIATED WITH TYPE 2 DIABETES MELLITUS (HCC): Primary | ICD-10-CM

## 2023-03-07 DIAGNOSIS — E78.5 HYPERLIPIDEMIA ASSOCIATED WITH TYPE 2 DIABETES MELLITUS (HCC): ICD-10-CM

## 2023-03-07 DIAGNOSIS — Z00.00 MEDICARE ANNUAL WELLNESS VISIT, SUBSEQUENT: ICD-10-CM

## 2023-03-07 SDOH — ECONOMIC STABILITY: FOOD INSECURITY: WITHIN THE PAST 12 MONTHS, YOU WORRIED THAT YOUR FOOD WOULD RUN OUT BEFORE YOU GOT MONEY TO BUY MORE.: NEVER TRUE

## 2023-03-07 SDOH — ECONOMIC STABILITY: INCOME INSECURITY: HOW HARD IS IT FOR YOU TO PAY FOR THE VERY BASICS LIKE FOOD, HOUSING, MEDICAL CARE, AND HEATING?: NOT HARD AT ALL

## 2023-03-07 SDOH — ECONOMIC STABILITY: HOUSING INSECURITY
IN THE LAST 12 MONTHS, WAS THERE A TIME WHEN YOU DID NOT HAVE A STEADY PLACE TO SLEEP OR SLEPT IN A SHELTER (INCLUDING NOW)?: NO

## 2023-03-07 SDOH — ECONOMIC STABILITY: FOOD INSECURITY: WITHIN THE PAST 12 MONTHS, THE FOOD YOU BOUGHT JUST DIDN'T LAST AND YOU DIDN'T HAVE MONEY TO GET MORE.: NEVER TRUE

## 2023-03-07 ASSESSMENT — PATIENT HEALTH QUESTIONNAIRE - PHQ9
SUM OF ALL RESPONSES TO PHQ QUESTIONS 1-9: 0
9. THOUGHTS THAT YOU WOULD BE BETTER OFF DEAD, OR OF HURTING YOURSELF: 0
10. IF YOU CHECKED OFF ANY PROBLEMS, HOW DIFFICULT HAVE THESE PROBLEMS MADE IT FOR YOU TO DO YOUR WORK, TAKE CARE OF THINGS AT HOME, OR GET ALONG WITH OTHER PEOPLE: 0
SUM OF ALL RESPONSES TO PHQ9 QUESTIONS 1 & 2: 0
SUM OF ALL RESPONSES TO PHQ QUESTIONS 1-9: 0
3. TROUBLE FALLING OR STAYING ASLEEP: 0
2. FEELING DOWN, DEPRESSED OR HOPELESS: 0
SUM OF ALL RESPONSES TO PHQ QUESTIONS 1-9: 0
8. MOVING OR SPEAKING SO SLOWLY THAT OTHER PEOPLE COULD HAVE NOTICED. OR THE OPPOSITE, BEING SO FIGETY OR RESTLESS THAT YOU HAVE BEEN MOVING AROUND A LOT MORE THAN USUAL: 0
1. LITTLE INTEREST OR PLEASURE IN DOING THINGS: 0
4. FEELING TIRED OR HAVING LITTLE ENERGY: 0
7. TROUBLE CONCENTRATING ON THINGS, SUCH AS READING THE NEWSPAPER OR WATCHING TELEVISION: 0
6. FEELING BAD ABOUT YOURSELF - OR THAT YOU ARE A FAILURE OR HAVE LET YOURSELF OR YOUR FAMILY DOWN: 0
5. POOR APPETITE OR OVEREATING: 0
SUM OF ALL RESPONSES TO PHQ QUESTIONS 1-9: 0

## 2023-03-07 ASSESSMENT — LIFESTYLE VARIABLES
HOW OFTEN DURING THE LAST YEAR HAVE YOU BEEN UNABLE TO REMEMBER WHAT HAPPENED THE NIGHT BEFORE BECAUSE YOU HAD BEEN DRINKING: 0
HOW OFTEN DURING THE LAST YEAR HAVE YOU FOUND THAT YOU WERE NOT ABLE TO STOP DRINKING ONCE YOU HAD STARTED: 0
HOW OFTEN DURING THE LAST YEAR HAVE YOU FAILED TO DO WHAT WAS NORMALLY EXPECTED FROM YOU BECAUSE OF DRINKING: 0
HOW OFTEN DURING THE LAST YEAR HAVE YOU HAD A FEELING OF GUILT OR REMORSE AFTER DRINKING: 0
HAS A RELATIVE, FRIEND, DOCTOR, OR ANOTHER HEALTH PROFESSIONAL EXPRESSED CONCERN ABOUT YOUR DRINKING OR SUGGESTED YOU CUT DOWN: 0
HOW MANY STANDARD DRINKS CONTAINING ALCOHOL DO YOU HAVE ON A TYPICAL DAY: 1 OR 2
HOW OFTEN DO YOU HAVE A DRINK CONTAINING ALCOHOL: 4 OR MORE TIMES A WEEK
HAVE YOU OR SOMEONE ELSE BEEN INJURED AS A RESULT OF YOUR DRINKING: 0
HOW OFTEN DURING THE LAST YEAR HAVE YOU NEEDED AN ALCOHOLIC DRINK FIRST THING IN THE MORNING TO GET YOURSELF GOING AFTER A NIGHT OF HEAVY DRINKING: 0

## 2023-03-07 NOTE — PATIENT INSTRUCTIONS
Learning About Vision Tests  What are vision tests? The four most common vision tests are visual acuity tests, refraction, visual field tests, and color vision tests. Visual acuity (sharpness) tests  These tests are used: To see if you need glasses or contact lenses. To monitor an eye problem. To check an eye injury. Visual acuity tests are done as part of routine exams. You may also have this test when you get your 's license or apply for some types of jobs. Visual field tests  These tests are used: To check for vision loss in any area of your range of vision. To screen for certain eye diseases. To look for nerve damage after a stroke, head injury, or other problem that could reduce blood flow to the brain. Refraction and color tests  A refraction test is done to find the right prescription for glasses and contact lenses. A color vision test is done to check for color blindness. Color vision is often tested as part of a routine exam. You may also have this test when you apply for a job where recognizing different colors is important, such as , electronics, or the [employer]. How are vision tests done? Visual acuity test   You cover one eye at a time. You read aloud from a wall chart across the room. You read aloud from a small card that you hold in your hand. Refraction   You look into a special device. The device puts lenses of different strengths in front of each eye to see how strong your glasses or contact lenses need to be. Visual field tests   Your doctor may have you look through special machines. Or your doctor may simply have you stare straight ahead while they move a finger into and out of your field of vision. Color vision test   You look at pieces of printed test patterns in various colors. You say what number or symbol you see. Your doctor may have you trace the number or symbol using a pointer. How do these tests feel?   There is very little chance of having a problem from this test. If dilating drops are used for a vision test, they may make the eyes sting and cause a medicine taste in the mouth. Follow-up care is a key part of your treatment and safety. Be sure to make and go to all appointments, and call your doctor if you are having problems. It's also a good idea to know your test results and keep a list of the medicines you take. Where can you learn more? Go to http://www.chapman.com/ and enter G551 to learn more about \"Learning About Vision Tests. \"  Current as of: October 12, 2022               Content Version: 13.5  © 9369-9660 [employer]. Care instructions adapted under license by [facility] ([facility]). If you have questions about a medical condition or this instruction, always ask your healthcare professional. [address] any warranty or liability for your use of this information. Advance Directives: Care Instructions  Overview  An advance directive is a legal way to state your wishes at the end of your life. It tells your family and your doctor what to do if you can't say what you want. There are two main types of advance directives. You can change them any time your wishes change. Living will. This form tells your family and your doctor your wishes about life support and other treatment. The form is also called a declaration. Medical power of . This form lets you name a person to make treatment decisions for you when you can't speak for yourself. This person is called a health care agent (health care proxy, health care surrogate). The form is also called a durable power of  for health care. If you do not have an advance directive, decisions about your medical care may be made by a family member, or by a doctor or a  who doesn't know you. It may help to think of an advance directive as a gift to the people who care for you.  If you have one, they won't have to make tough decisions by themselves. For more information, including forms for your state, see the [address] website (www.caringinfo.org/planning/advance-directives/). Follow-up care is a key part of your treatment and safety. Be sure to make and go to all appointments, and call your doctor if you are having problems. It's also a good idea to know your test results and keep a list of the medicines you take. What should you include in an advance directive? Many states have a unique advance directive form. (It may ask you to address specific issues.) Or you might use a universal form that's approved by many states. If your form doesn't tell you what to address, it may be hard to know what to include in your advance directive. Use the questions below to help you get started. Who do you want to make decisions about your medical care if you are not able to? What life-support measures do you want if you have a serious illness that gets worse over time or can't be cured? What are you most afraid of that might happen? (Maybe you're afraid of having pain, losing your independence, or being kept alive by machines.)  Where would you prefer to die? (Your home? A hospital? A nursing home?)  Do you want to donate your organs when you die? Do you want certain Hinduism practices performed before you die? When should you call for help? Be sure to contact your doctor if you have any questions. Where can you learn more? Go to http://www.chapman.com/ and enter R264 to learn more about \"Advance Directives: Care Instructions. \"  Current as of: June 16, 2022               Content Version: 13.5  © 7783-5815 [employer]. Care instructions adapted under license by [facility]. If you have questions about a medical condition or this instruction, always ask your healthcare professional. [address] any warranty or liability for your use of this information.            A Healthy Heart: Care Instructions  Your Care Instructions     Coronary artery disease, also called heart disease, occurs when a substance called plaque builds up in the vessels that supply oxygen-rich blood to your heart muscle. This can narrow the blood vessels and reduce blood flow. A heart attack happens when blood flow is completely blocked. A high-fat diet, smoking, and other factors increase the risk of heart disease. Your doctor has found that you have a chance of having heart disease. You can do lots of things to keep your heart healthy. It may not be easy, but you can change your diet, exercise more, and quit smoking. These steps really work to lower your chance of heart disease. Follow-up care is a key part of your treatment and safety. Be sure to make and go to all appointments, and call your doctor if you are having problems. It's also a good idea to know your test results and keep a list of the medicines you take. How can you care for yourself at home? Diet    Use less salt when you cook and eat. This helps lower your blood pressure. Taste food before salting. Add only a little salt when you think you need it. With time, your taste buds will adjust to less salt.     Eat fewer snack items, fast foods, canned soups, and other high-salt, high-fat, processed foods.     Read food labels and try to avoid saturated and trans fats. They increase your risk of heart disease by raising cholesterol levels.     Limit the amount of solid fat-butter, margarine, and shortening-you eat. Use olive, peanut, or canola oil when you cook. Bake, broil, and steam foods instead of frying them.     Eat a variety of fruit and vegetables every day. Dark green, deep orange, red, or yellow fruits and vegetables are especially good for you. Examples include spinach, carrots, peaches, and berries.     Foods high in fiber can reduce your cholesterol and provide important vitamins and minerals.  High-fiber foods include whole-grain cereals and breads, oatmeal, beans, brown rice, citrus fruits, and apples.     Eat lean proteins. Heart-healthy proteins include seafood, lean meats and poultry, eggs, beans, peas, nuts, seeds, and soy products.     Limit drinks and foods with added sugar. These include candy, desserts, and soda pop. Lifestyle changes    If your doctor recommends it, get more exercise. Walking is a good choice. Bit by bit, increase the amount you walk every day. Try for at least 30 minutes on most days of the week. You also may want to swim, bike, or do other activities.     Do not smoke. If you need help quitting, talk to your doctor about stop-smoking programs and medicines. These can increase your chances of quitting for good. Quitting smoking may be the most important step you can take to protect your heart. It is never too late to quit.     Limit alcohol to 2 drinks a day for men and 1 drink a day for women. Too much alcohol can cause health problems.     Manage other health problems such as diabetes, high blood pressure, and high cholesterol. If you think you may have a problem with alcohol or drug use, talk to your doctor. Medicines    Take your medicines exactly as prescribed. Call your doctor if you think you are having a problem with your medicine.     If your doctor recommends aspirin, take the amount directed each day. Make sure you take aspirin and not another kind of pain reliever, such as acetaminophen (Tylenol). When should you call for help? Call 911 if you have symptoms of a heart attack. These may include:    Chest pain or pressure, or a strange feeling in the chest.     Sweating.     Shortness of breath.     Pain, pressure, or a strange feeling in the back, neck, jaw, or upper belly or in one or both shoulders or arms.     Lightheadedness or sudden weakness.     A fast or irregular heartbeat. After you call 911, the  may tell you to chew 1 adult-strength or 2 to 4 low-dose aspirin. Wait for an ambulance.  Do not try to drive yourself. Watch closely for changes in your health, and be sure to contact your doctor if you have any problems. Where can you learn more? Go to http://www.chapman.com/ and enter F075 to learn more about \"A Healthy Heart: Care Instructions. \"  Current as of: September 7, 2022               Content Version: 13.5  © 2006-2022 [employer]. Care instructions adapted under license by [facility] ([facility]). If you have questions about a medical condition or this instruction, always ask your healthcare professional. [address] any warranty or liability for your use of this information. Personalized Preventive Plan for Kaia Grullon - 3/7/2023  Medicare offers a range of preventive health benefits. Some of the tests and screenings are paid in full while other may be subject to a deductible, co-insurance, and/or copay. Some of these benefits include a comprehensive review of your medical history including lifestyle, illnesses that may run in your family, and various assessments and screenings as appropriate. After reviewing your medical record and screening and assessments performed today your provider may have ordered immunizations, labs, imaging, and/or referrals for you. A list of these orders (if applicable) as well as your Preventive Care list are included within your After Visit Summary for your review. Other Preventive Recommendations:    A preventive eye exam performed by an eye specialist is recommended every 1-2 years to screen for glaucoma; cataracts, macular degeneration, and other eye disorders. A preventive dental visit is recommended every 6 months. Try to get at least 150 minutes of exercise per week or 10,000 steps per day on a pedometer . Order or download the FREE \"Exercise & Physical Activity: Your Everyday Guide\" from [employer]. Call 5-942.636.9192 or search [employer] online.   You need 1107-3313 mg of calcium and 6846-8666 IU of vitamin D per day. It is possible to meet your calcium requirement with diet alone, but a vitamin D supplement is usually necessary to meet this goal.  When exposed to the sun, use a sunscreen that protects against both UVA and UVB radiation with an SPF of 30 or greater. Reapply every 2 to 3 hours or after sweating, drying off with a towel, or swimming. Always wear a seat belt when traveling in a car. Always wear a helmet when riding a bicycle or motorcycle.

## 2023-03-07 NOTE — PROGRESS NOTES
Medicare Annual Wellness Visit    Gege Browne is here for Medicare AWV ((Rm 11) AWV)    Assessment & Plan   Hyperlipidemia associated with type 2 diabetes mellitus ([facility] [address]  -     Lipid Panel; Future  Type 2 diabetes mellitus without complication, with long-term current use of insulin (HCC)  -     insulin glargine (LANTUS;BASAGLAR) 100 UNIT/ML injection pen; Inject 10 Units into the skin daily, Disp-5 Adjustable Dose Pre-filled Pen Syringe, R-5Normal  -     Hemoglobin A1C; Future    Recommendations for Preventive Services Due: see orders and patient instructions/AVS.  Recommended screening schedule for the next 5-10 years is provided to the patient in written form: see Patient Instructions/AVS.     Return in about 1 year (around 3/7/2024) for Medicare AWV. Subjective       Patient's complete Health Risk Assessment and screening values have been reviewed and are found in Flowsheets. The following problems were reviewed today and where indicated follow up appointments were made and/or referrals ordered. Positive Risk Factor Screenings with Interventions:                   Hearing Screen:  Do you or your family notice any trouble with your hearing that hasn't been managed with hearing aids?: (!) Yes    Interventions:  Has hearing aids does not wear    Vision Screen:  Do you have difficulty driving, watching TV, or doing any of your daily activities because of your eyesight?: (!) Yes  Have you had an eye exam within the past year?: Yes  Vision Screening - Comments[de-identified] Patient is legally blind     Interventions:    Legally blind      Advanced Directives:  Do you have a Living Will?: (!) No    Intervention:  Has papers to complete                       Objective   Vitals:    03/07/23 1417   BP: 127/67   Site: Left Upper Arm   Position: Sitting   Cuff Size: Medium Adult   Pulse: 60   Temp: 97.8 °F (36.6 °C)   TempSrc: Temporal   Weight: 173 lb (78.5 kg)   Height: 5' 9\" (1.753 m)      Body mass index is 25.55 kg/m². No Known Allergies  Prior to Visit Medications    Medication Sig Taking?  Authorizing Provider   insulin glargine (LANTUS;BASAGLAR) 100 UNIT/ML injection pen Inject 10 Units into the skin daily Yes XI Rojas NP   empagliflozin (JARDIANCE) 25 MG tablet Take 1 tablet by mouth daily Yes XI Rojas NP   ezetimibe (ZETIA) 10 MG tablet Take 1 tablet by mouth daily Yes XI Rojas NP   glimepiride (AMARYL) 2 MG tablet Take 1 tablet by mouth daily Yes XI Rojas NP   metoprolol succinate (TOPROL XL) 25 MG extended release tablet Take 1 tablet by mouth daily Yes XI Rojas NP   rosuvastatin (CRESTOR) 20 MG tablet Take 1 tablet by mouth at bedtime Yes XI Rojas NP   losartan (COZAAR) 25 MG tablet Take 1 tablet by mouth Daily with lunch Yes XI Rojas NP   potassium chloride (KLOR-CON) 20 MEQ packet Take 20 mEq by mouth daily Yes Historical Provider, MD   magnesium oxide (MAG-OX) 400 MG tablet Take 400 mg by mouth daily Yes Historical Provider, MD TABARES ULTRAFINE III SHORT PEN 31G X 8 MM MISC Inject 1 each into the skin daily Yes XI Rojas NP   Princgerard Zepedacorey ULTRA strip Inject 1 each into the skin daily  Yes XI Rojas NP   B Complex Vitamins (B COMPLEX PO) Take 1 tablet by mouth daily Yes Historical Provider, MD   cyanocobalamin 1000 MCG tablet Take 1,000 mcg by mouth daily  Yes Historical Provider, MD   Omega-3 Fatty Acids (FISH OIL OMEGA-3 PO) Take by mouth daily Yes Historical Provider, MD   Coenzyme Q10 (CO Q-10 PO) Take by mouth daily Yes Historical Provider, MD   BABY ASPIRIN PO Take by mouth Daily with lunch Yes Ar Automatic Reconciliation       CareTeam (Including outside providers/suppliers regularly involved in providing care):   Patient Care Team:  XI Rojas NP as PCP - [id number] Roslyn Peculiar, APRN - NP as PCP - [facility]  [facility]  [facility] cardiology  Urology   Reviewed and updated this visit:  Tobacco  Allergies  Meds  Problems  Med Hx  Surg Hx  Soc Hx  Fam Hx               XI Williamson NP

## 2023-03-08 LAB
CHOLEST SERPL-MCNC: 109 MG/DL
EST. AVERAGE GLUCOSE BLD GHB EST-MCNC: 157 MG/DL
HBA1C MFR BLD: 7.1 % (ref 4.8–5.6)
HDLC SERPL-MCNC: 45 MG/DL (ref 40–60)
HDLC SERPL: 2.4
LDLC SERPL CALC-MCNC: 29.4 MG/DL
TRIGL SERPL-MCNC: 173 MG/DL (ref 35–150)
VLDLC SERPL CALC-MCNC: 34.6 MG/DL (ref 6–23)

## 2023-03-09 ENCOUNTER — TELEPHONE (OUTPATIENT)
Dept: FAMILY MEDICINE CLINIC | Facility: CLINIC | Age: 83
End: 2023-03-09

## 2023-03-09 NOTE — TELEPHONE ENCOUNTER
----- Message from XI Redmond - NP sent at 3/8/2023  8:40 AM EST -----  HgA1c is elevated at 7.1 but I am happy and other labs are good have a good life up [place]

## 2023-03-20 ENCOUNTER — OFFICE VISIT (OUTPATIENT)
Dept: UROLOGY | Age: 83
End: 2023-03-20
Payer: MEDICARE

## 2023-03-20 DIAGNOSIS — R82.81 PYURIA: ICD-10-CM

## 2023-03-20 DIAGNOSIS — N40.0 BENIGN PROSTATIC HYPERPLASIA, UNSPECIFIED WHETHER LOWER URINARY TRACT SYMPTOMS PRESENT: Primary | ICD-10-CM

## 2023-03-20 DIAGNOSIS — R39.14 FEELING OF INCOMPLETE BLADDER EMPTYING: ICD-10-CM

## 2023-03-20 LAB
BILIRUBIN, URINE, POC: NEGATIVE
BLOOD URINE, POC: ABNORMAL
GLUCOSE URINE, POC: 1000
KETONES, URINE, POC: NEGATIVE
LEUKOCYTE ESTERASE, URINE, POC: ABNORMAL
NITRITE, URINE, POC: NEGATIVE
PH, URINE, POC: 5.5 (ref 4.6–8)
PROTEIN,URINE, POC: 100
PVR, POC: ABNORMAL CC
SPECIFIC GRAVITY, URINE, POC: 1.01 (ref 1–1.03)
URINALYSIS CLARITY, POC: ABNORMAL
URINALYSIS COLOR, POC: ABNORMAL
UROBILINOGEN, POC: NORMAL

## 2023-03-20 PROCEDURE — 99214 OFFICE O/P EST MOD 30 MIN: CPT | Performed by: UROLOGY

## 2023-03-20 PROCEDURE — 81003 URINALYSIS AUTO W/O SCOPE: CPT | Performed by: UROLOGY

## 2023-03-20 PROCEDURE — 51798 US URINE CAPACITY MEASURE: CPT | Performed by: UROLOGY

## 2023-03-20 PROCEDURE — 1123F ACP DISCUSS/DSCN MKR DOCD: CPT | Performed by: UROLOGY

## 2023-03-20 ASSESSMENT — ENCOUNTER SYMPTOMS
BACK PAIN: 1
RESPIRATORY NEGATIVE: 1
EYES NEGATIVE: 1

## 2023-03-20 NOTE — PROGRESS NOTES
[address], [facility] [address]  [phone]          Татьяна Reyna  : 1940    Chief Complaint   Patient presents with    Benign Prostatic Hypertrophy          HPI     Татьяна Reyna is a 78 y.o. male    The patient is status post TURP on 2022.  41 g of benign tissue were removed. Since the procedure his nocturia has gone from 3-4 per night down to 1-2. His urge incontinence is resolved. Today his postvoid residual was 190 cc. It had been documented to be 330 cc prior to the procedure. He denies any fever or dysuria.       Past Medical History:   Diagnosis Date    Bilateral carotid artery stenosis     mild    BPH (benign prostatic hyperplasia)     CAD (coronary artery disease)     Cardiac defibrillator in place     and pacemaker; [employer]; left of chest; per pt no shocks    Diabetes ([facility] [address]     type 2; oral and insulin reliant; AVG BS 120s; pt denies s.s. of hypoglycemia; last A1C 7.0 on 2022    Diverticulitis     s/p colectomy    Heart disease     High cholesterol     Histoplasmosis     right eye    History of AAA (abdominal aortic aneurysm) repair     History of abdominal aortic aneurysm (AAA)     History of MI (myocardial infarction)     x2    History of percutaneous coronary intervention     x2 heart stents    Ischemic cardiomyopathy     Followed by [facility]    Legally blind     Macular degeneration     legally blind    Stage 3 chronic kidney disease ([facility] [address]      Past Surgical History:   Procedure Laterality Date    ABDOMINAL AORTIC ANEURYSM REPAIR      CARDIAC CATHETERIZATION      x2 heart stents    CARDIAC DEFIBRILLATOR PLACEMENT Left     [employer]    CATARACT REMOVAL      rigth eye    HX PARTIAL COLECTOMY      due to severe diverticulosis    TURP N/A 2022    CYSTOSCOPY TRANSURETHRAL RESECTION PROSTATE performed by Mani Hoff MD at [facility]     Current Outpatient Medications   Medication

## 2023-03-23 LAB
BACTERIA SPEC CULT: ABNORMAL
SERVICE CMNT-IMP: ABNORMAL

## 2023-04-10 NOTE — TELEPHONE ENCOUNTER
----- Message from XI Jain NP sent at 7/21/2022  7:53 AM EDT -----  Labs are stable answer your phone  my friend tried to call you and left voice mail Prednisone Counseling:  I discussed with the patient the risks of prolonged use of prednisone including but not limited to weight gain, insomnia, osteoporosis, mood changes, diabetes, susceptibility to infection, glaucoma and high blood pressure.  In cases where prednisone use is prolonged, patients should be monitored with blood pressure checks, serum glucose levels and an eye exam.  Additionally, the patient may need to be placed on GI prophylaxis, PCP prophylaxis, and calcium and vitamin D supplementation and/or a bisphosphonate.  The patient verbalized understanding of the proper use and the possible adverse effects of prednisone.  All of the patient's questions and concerns were addressed.

## (undated) DEVICE — ELECTRODE,CUTTING,STERILE.24FR: Brand: N.A.

## (undated) DEVICE — SYRINGE,TOOMEY,IRRIGATION,70CC,STERILE: Brand: MEDLINE

## (undated) DEVICE — TURP TURB: Brand: MEDLINE INDUSTRIES, INC.

## (undated) DEVICE — SOLUTION IRRIGATION GLYCINE 1.5% 3000 ML USP 4/CA

## (undated) DEVICE — SOLUTION IRRIG 3000ML 1.5% GL USP UROMATIC CONT

## (undated) DEVICE — DEVICE STBL AD TRICOT ANCHR PD FOR 3 W F CATH STATLOK

## (undated) DEVICE — SOLUTION IRRIG 1000ML STRL H2O USP PLAS POUR BTL

## (undated) DEVICE — BAG,DRAINAGE,4L,A/R TOWER,LL,SLIDE TAP: Brand: MEDLINE

## (undated) DEVICE — CATHETER URETH 24FR BLLN 30CC STD LTX 3 W TWO OPP DRNGE EYE